# Patient Record
Sex: FEMALE | Race: WHITE | NOT HISPANIC OR LATINO | Employment: FULL TIME | URBAN - METROPOLITAN AREA
[De-identification: names, ages, dates, MRNs, and addresses within clinical notes are randomized per-mention and may not be internally consistent; named-entity substitution may affect disease eponyms.]

---

## 2017-03-13 RX ORDER — TAMSULOSIN HYDROCHLORIDE 0.4 MG/1
0.4 CAPSULE ORAL DAILY
Qty: 30 CAPSULE | Refills: 12 | Status: SHIPPED | OUTPATIENT
Start: 2017-03-13 | End: 2019-02-10

## 2017-03-13 RX ORDER — KETOROLAC TROMETHAMINE 10 MG/1
10 TABLET, FILM COATED ORAL EVERY 6 HOURS
Qty: 15 TABLET | Refills: 2 | Status: SHIPPED | OUTPATIENT
Start: 2017-03-13 | End: 2017-11-22 | Stop reason: SDUPTHER

## 2017-06-15 RX ORDER — CIPROFLOXACIN 500 MG/1
500 TABLET ORAL EVERY 12 HOURS
Qty: 14 TABLET | Refills: 11 | Status: SHIPPED | OUTPATIENT
Start: 2017-06-15 | End: 2017-06-22

## 2017-06-22 RX ORDER — BUPROPION HYDROCHLORIDE 75 MG/1
75 TABLET ORAL 2 TIMES DAILY
Qty: 60 TABLET | Refills: 11 | Status: SHIPPED | OUTPATIENT
Start: 2017-06-22 | End: 2019-02-10

## 2017-07-13 ENCOUNTER — OFFICE VISIT (OUTPATIENT)
Dept: OBSTETRICS AND GYNECOLOGY | Facility: CLINIC | Age: 27
End: 2017-07-13
Payer: COMMERCIAL

## 2017-07-13 VITALS
SYSTOLIC BLOOD PRESSURE: 100 MMHG | BODY MASS INDEX: 19.27 KG/M2 | DIASTOLIC BLOOD PRESSURE: 60 MMHG | HEIGHT: 64 IN | WEIGHT: 112.88 LBS

## 2017-07-13 DIAGNOSIS — N89.8 VAGINAL ODOR: Primary | ICD-10-CM

## 2017-07-13 PROCEDURE — 87210 SMEAR WET MOUNT SALINE/INK: CPT | Mod: QW,S$GLB,, | Performed by: OBSTETRICS & GYNECOLOGY

## 2017-07-13 PROCEDURE — 87591 N.GONORRHOEAE DNA AMP PROB: CPT

## 2017-07-13 PROCEDURE — 99203 OFFICE O/P NEW LOW 30 MIN: CPT | Mod: S$GLB,,, | Performed by: OBSTETRICS & GYNECOLOGY

## 2017-07-13 PROCEDURE — 87070 CULTURE OTHR SPECIMN AEROBIC: CPT

## 2017-07-13 PROCEDURE — 99999 PR PBB SHADOW E&M-EST. PATIENT-LVL III: CPT | Mod: PBBFAC,,, | Performed by: OBSTETRICS & GYNECOLOGY

## 2017-07-13 RX ORDER — METRONIDAZOLE 7.5 MG/G
1 GEL VAGINAL NIGHTLY
Qty: 70 G | Refills: 1 | Status: SHIPPED | OUTPATIENT
Start: 2017-07-13 | End: 2019-02-10

## 2017-07-13 NOTE — PROGRESS NOTES
Subjective:       Patient ID: Gypsy Rivera is a 26 y.o. female.    Chief Complaint:  Vaginal Discharge (discharge with odor.)    History of Present Illness  HPI  25 yo G0 presents as new pt c/o vaginal fishy odor x 4d. No itching/burning/irritation. On seasonique with no recent cycle  GYN & OB History  Patient's last menstrual period was 05/01/2017 (approximate).   Date of Last Pap: No result found    OB History   No data available       Review of Systems  Review of Systems   All other systems reviewed and are negative.          Objective:    Physical Exam:   Constitutional: She is oriented to person, place, and time. She appears well-developed and well-nourished.        Pulmonary/Chest: Effort normal.          Genitourinary: Vagina normal.   Genitourinary Comments: Normal white discharge. Cervix not friable           Musculoskeletal: Moves all extremeties.       Neurological: She is alert and oriented to person, place, and time.    Skin: Skin is warm and dry.    Psychiatric: She has a normal mood and affect. Her behavior is normal.        wet prep clue cells, wbcs    Assessment:        1. Vaginal odor       Plan:      1. metrogel  2. Vaginitis prevention  3. Genital culture, anjum/chlam

## 2017-07-14 LAB
C TRACH DNA SPEC QL NAA+PROBE: NOT DETECTED
N GONORRHOEA DNA SPEC QL NAA+PROBE: NOT DETECTED

## 2017-07-17 LAB — BACTERIA GENITAL AEROBE CULT: NORMAL

## 2017-11-22 RX ORDER — LEVOFLOXACIN 250 MG/1
250 TABLET ORAL DAILY
Qty: 7 TABLET | Refills: 3 | Status: SHIPPED | OUTPATIENT
Start: 2017-11-22 | End: 2019-02-10

## 2017-11-22 RX ORDER — KETOROLAC TROMETHAMINE 10 MG/1
10 TABLET, FILM COATED ORAL EVERY 6 HOURS
Qty: 15 TABLET | Refills: 3 | Status: SHIPPED | OUTPATIENT
Start: 2017-11-22 | End: 2019-02-10

## 2017-11-22 RX ORDER — PHENAZOPYRIDINE HYDROCHLORIDE 200 MG/1
200 TABLET, FILM COATED ORAL 3 TIMES DAILY PRN
Qty: 90 TABLET | Refills: 3 | Status: SHIPPED | OUTPATIENT
Start: 2017-11-22 | End: 2017-12-02

## 2017-11-22 RX ORDER — CYCLOBENZAPRINE HCL 10 MG
10 TABLET ORAL 3 TIMES DAILY PRN
Qty: 90 TABLET | Refills: 3 | Status: SHIPPED | OUTPATIENT
Start: 2017-11-22 | End: 2017-12-02

## 2019-02-10 ENCOUNTER — HOSPITAL ENCOUNTER (EMERGENCY)
Facility: HOSPITAL | Age: 29
Discharge: HOME | End: 2019-02-10
Attending: FAMILY MEDICINE
Payer: COMMERCIAL

## 2019-02-10 VITALS
WEIGHT: 126.06 LBS | HEART RATE: 94 BPM | TEMPERATURE: 98 F | DIASTOLIC BLOOD PRESSURE: 78 MMHG | BODY MASS INDEX: 21.64 KG/M2 | RESPIRATION RATE: 16 BRPM | OXYGEN SATURATION: 98 % | SYSTOLIC BLOOD PRESSURE: 134 MMHG

## 2019-02-10 DIAGNOSIS — N39.0 CHRONIC UTI: ICD-10-CM

## 2019-02-10 DIAGNOSIS — N20.0 NEPHROLITHIASIS: Primary | ICD-10-CM

## 2019-02-10 LAB
ALBUMIN SERPL BCP-MCNC: 3.8 G/DL
ALP SERPL-CCNC: 71 U/L
ALT SERPL W/O P-5'-P-CCNC: 30 U/L
ANION GAP SERPL CALC-SCNC: 9 MMOL/L
AST SERPL-CCNC: 35 U/L
B-HCG UR QL: NEGATIVE
BACTERIA #/AREA URNS HPF: ABNORMAL /HPF
BACTERIA GENITAL QL WET PREP: ABNORMAL
BASOPHILS # BLD AUTO: 0.01 K/UL
BASOPHILS NFR BLD: 0.2 %
BILIRUB SERPL-MCNC: 0.5 MG/DL
BILIRUB UR QL STRIP: ABNORMAL
BUN SERPL-MCNC: 8 MG/DL
CALCIUM SERPL-MCNC: 9.4 MG/DL
CHLORIDE SERPL-SCNC: 108 MMOL/L
CLARITY UR: CLEAR
CLUE CELLS VAG QL WET PREP: ABNORMAL
CO2 SERPL-SCNC: 23 MMOL/L
COLOR UR: ABNORMAL
CREAT SERPL-MCNC: 0.9 MG/DL
DIFFERENTIAL METHOD: NORMAL
EOSINOPHIL # BLD AUTO: 0.1 K/UL
EOSINOPHIL NFR BLD: 1.6 %
ERYTHROCYTE [DISTWIDTH] IN BLOOD BY AUTOMATED COUNT: 13.2 %
EST. GFR  (AFRICAN AMERICAN): >60 ML/MIN/1.73 M^2
EST. GFR  (NON AFRICAN AMERICAN): >60 ML/MIN/1.73 M^2
FILAMENT FUNGI VAG WET PREP-#/AREA: ABNORMAL
GLUCOSE SERPL-MCNC: 86 MG/DL
GLUCOSE UR QL STRIP: ABNORMAL
HCT VFR BLD AUTO: 37.7 %
HGB BLD-MCNC: 12.3 G/DL
HGB UR QL STRIP: ABNORMAL
KETONES UR QL STRIP: ABNORMAL
LEUKOCYTE ESTERASE UR QL STRIP: ABNORMAL
LIPASE SERPL-CCNC: 44 U/L
LYMPHOCYTES # BLD AUTO: 2.1 K/UL
LYMPHOCYTES NFR BLD: 37.3 %
MCH RBC QN AUTO: 29.8 PG
MCHC RBC AUTO-ENTMCNC: 32.6 G/DL
MCV RBC AUTO: 91 FL
MICROSCOPIC COMMENT: ABNORMAL
MONOCYTES # BLD AUTO: 0.3 K/UL
MONOCYTES NFR BLD: 5.1 %
NEUTROPHILS # BLD AUTO: 3.2 K/UL
NEUTROPHILS NFR BLD: 55.8 %
NITRITE UR QL STRIP: ABNORMAL
PH UR STRIP: ABNORMAL [PH] (ref 5–8)
PLATELET # BLD AUTO: 276 K/UL
PMV BLD AUTO: 10.7 FL
POTASSIUM SERPL-SCNC: 4 MMOL/L
PROT SERPL-MCNC: 7.1 G/DL
PROT UR QL STRIP: ABNORMAL
RBC # BLD AUTO: 4.13 M/UL
RBC #/AREA URNS HPF: 5 /HPF (ref 0–4)
SODIUM SERPL-SCNC: 140 MMOL/L
SP GR UR STRIP: ABNORMAL (ref 1–1.03)
SPECIMEN SOURCE: ABNORMAL
SQUAMOUS #/AREA URNS HPF: 0 /HPF
T VAGINALIS GENITAL QL WET PREP: ABNORMAL
URN SPEC COLLECT METH UR: ABNORMAL
UROBILINOGEN UR STRIP-ACNC: ABNORMAL EU/DL
WBC # BLD AUTO: 5.71 K/UL
WBC #/AREA URNS HPF: 0 /HPF (ref 0–5)
WBC #/AREA VAG WET PREP: ABNORMAL
YEAST GENITAL QL WET PREP: ABNORMAL

## 2019-02-10 PROCEDURE — 96374 THER/PROPH/DIAG INJ IV PUSH: CPT

## 2019-02-10 PROCEDURE — 25000003 PHARM REV CODE 250: Performed by: FAMILY MEDICINE

## 2019-02-10 PROCEDURE — 83690 ASSAY OF LIPASE: CPT

## 2019-02-10 PROCEDURE — 36415 COLL VENOUS BLD VENIPUNCTURE: CPT

## 2019-02-10 PROCEDURE — 86703 HIV-1/HIV-2 1 RESULT ANTBDY: CPT

## 2019-02-10 PROCEDURE — 82365 CALCULUS SPECTROSCOPY: CPT

## 2019-02-10 PROCEDURE — 81000 URINALYSIS NONAUTO W/SCOPE: CPT

## 2019-02-10 PROCEDURE — 85025 COMPLETE CBC W/AUTO DIFF WBC: CPT

## 2019-02-10 PROCEDURE — 63600175 PHARM REV CODE 636 W HCPCS: Performed by: FAMILY MEDICINE

## 2019-02-10 PROCEDURE — 99284 EMERGENCY DEPT VISIT MOD MDM: CPT | Mod: 25

## 2019-02-10 PROCEDURE — 96361 HYDRATE IV INFUSION ADD-ON: CPT

## 2019-02-10 PROCEDURE — 81025 URINE PREGNANCY TEST: CPT

## 2019-02-10 PROCEDURE — 80053 COMPREHEN METABOLIC PANEL: CPT

## 2019-02-10 PROCEDURE — 87491 CHLMYD TRACH DNA AMP PROBE: CPT

## 2019-02-10 PROCEDURE — 87210 SMEAR WET MOUNT SALINE/INK: CPT

## 2019-02-10 RX ORDER — PROMETHAZINE HYDROCHLORIDE 25 MG/1
12.5 TABLET ORAL EVERY 6 HOURS PRN
Qty: 15 TABLET | Refills: 0 | Status: SHIPPED | OUTPATIENT
Start: 2019-02-10 | End: 2020-07-15 | Stop reason: CLARIF

## 2019-02-10 RX ORDER — FLUCONAZOLE 150 MG/1
150 TABLET ORAL DAILY
Qty: 1 TABLET | Refills: 0 | Status: SHIPPED | OUTPATIENT
Start: 2019-02-10 | End: 2019-02-11

## 2019-02-10 RX ORDER — CEPHALEXIN 500 MG/1
500 CAPSULE ORAL EVERY 12 HOURS
Qty: 10 CAPSULE | Refills: 0 | Status: SHIPPED | OUTPATIENT
Start: 2019-02-10 | End: 2019-02-15

## 2019-02-10 RX ORDER — TRAZODONE HYDROCHLORIDE 50 MG/1
50 TABLET ORAL NIGHTLY
Status: ON HOLD | COMMUNITY
End: 2023-12-01 | Stop reason: HOSPADM

## 2019-02-10 RX ORDER — KETOROLAC TROMETHAMINE 30 MG/ML
15 INJECTION, SOLUTION INTRAMUSCULAR; INTRAVENOUS
Status: COMPLETED | OUTPATIENT
Start: 2019-02-10 | End: 2019-02-10

## 2019-02-10 RX ORDER — CLONAZEPAM 0.5 MG/1
0.5 TABLET ORAL 2 TIMES DAILY PRN
Status: ON HOLD | COMMUNITY
End: 2023-12-01 | Stop reason: HOSPADM

## 2019-02-10 RX ORDER — HYDROCODONE BITARTRATE AND ACETAMINOPHEN 5; 325 MG/1; MG/1
1 TABLET ORAL EVERY 8 HOURS PRN
Qty: 18 TABLET | Refills: 0 | Status: SHIPPED | OUTPATIENT
Start: 2019-02-10 | End: 2019-02-15

## 2019-02-10 RX ORDER — LEVONORGESTREL AND ETHINYL ESTRADIOL 0.15-0.03
1 KIT ORAL DAILY
COMMUNITY
End: 2021-04-29

## 2019-02-10 RX ORDER — METHYLPHENIDATE HYDROCHLORIDE 18 MG/1
18 TABLET ORAL EVERY MORNING
COMMUNITY
End: 2021-04-29

## 2019-02-10 RX ORDER — DULOXETIN HYDROCHLORIDE 30 MG/1
60 CAPSULE, DELAYED RELEASE ORAL NIGHTLY
COMMUNITY
End: 2021-04-29

## 2019-02-10 RX ORDER — PROPRANOLOL HYDROCHLORIDE 10 MG/1
10 TABLET ORAL DAILY PRN
COMMUNITY

## 2019-02-10 RX ADMIN — KETOROLAC TROMETHAMINE 15 MG: 30 INJECTION, SOLUTION INTRAMUSCULAR; INTRAVENOUS at 10:02

## 2019-02-10 RX ADMIN — SODIUM CHLORIDE 1000 ML: 0.9 INJECTION, SOLUTION INTRAVENOUS at 10:02

## 2019-02-10 NOTE — ED NOTES
Pt. Resting in bed. No acute distress, RR equal and non labored. Bed in low, locked, and call light in reach. Side rails up X 2. Will continue to monitor.

## 2019-02-10 NOTE — ED PROVIDER NOTES
SCRIBE #1 NOTE: ICristina, alicia scribing for, and in the presence of, Emma Caraballo MD. I have scribed the entire note.       History     Chief Complaint   Patient presents with    Flank Pain     Pt c/o left sided intermittent flank pain that began today, Hx of kidney stones in past, last week Dx with UTI and finished course of macrobid, last night reports vaginal discharge and itching- Tx with OTC monistat     Review of patient's allergies indicates:  No Known Allergies      History of Present Illness     HPI    2/10/2019, 9:56 AM  History obtained from the patient      History of Present Illness: Gypsy Rivera is a 28 y.o. female patient with a PMHx of kidney stones who presents to the Emergency Department for evaluation of L flank pain which onset suddenly this morning. Patient reports that she has frequent UTIs and was prescribed Macrobid for UTI one week ago. Her urinary urgency did not improve on the abx. She reports vaginal itching/burning/discharge and she used OTC Monistat because she thought she developed a yeast infection from the abx. This morning, she woke up with suprapubic pain and then developed 10/10 L flank pain that has resolved. She is in no pain currently. The flank pain felt similar to her kidney stone pain in the past. No mitigating or exacerbating factors reported. Patient denies fever, chills, N/V, hematuria, genital sores, and all other sxs at this time. She is sexually active with more than one partner and sometimes uses condoms. She also took Azo this morning.      Arrival mode: Personal vehicle     PCP: Primary Doctor No        Past Medical History:  Past Medical History:   Diagnosis Date    ADD (attention deficit disorder)     Anxiety     Kidney stones        Past Surgical History:  Past Surgical History:   Procedure Laterality Date    COSMETIC SURGERY      rhinoplasty    RHINOPLASTY-cosmetic-will need video Bilateral 7/15/2014    Performed by Krzysztof Nunez III, MD  at [facility] OR 2ND F[facility]         Family History:  Family History   Problem Relation Age of Onset    Heart disease Mother     Cancer Maternal Grandfather         Prostate     Eczema Sister     Melanoma Neg Hx     Psoriasis Neg Hx     Lupus Neg Hx        Social History:  Social History     Tobacco Use    Smoking status: Never Smoker    Smokeless tobacco: Never Used   Substance and Sexual Activity    Alcohol use: Yes     Comment: occasional    Drug use: No    Sexual activity: Yes     Partners: Male        Review of Systems     Review of Systems   Constitutional: Negative for chills and fever.   HENT: Negative for sore throat.    Respiratory: Negative for shortness of breath.    Cardiovascular: Negative for chest pain.   Gastrointestinal: Negative for constipation, diarrhea, nausea and vomiting.   Genitourinary: Positive for flank pain (L), pelvic pain, urgency, vaginal discharge and vaginal pain (burning and itching). Negative for genital sores and hematuria.   Musculoskeletal: Negative for back pain.   Skin: Negative for rash.   Neurological: Negative for weakness.   Hematological: Does not bruise/bleed easily.   All other systems reviewed and are negative.     Physical Exam     Initial Vitals [02/10/19 1002]   BP Pulse Resp Temp SpO2   (!) 150/88 78 18 98.2 °F (36.8 °C) 100 %      MAP       --          Physical Exam  Nursing Notes and Vital Signs Reviewed.  Constitutional: Patient is in no acute distress. Well-developed and well-nourished.  Head: Atraumatic. Normocephalic.  Eyes: PERRL. EOM intact. Conjunctivae are not pale. No scleral icterus.  ENT: Mucous membranes are moist. Oropharynx is clear and symmetric.    Neck: Supple. Full ROM.   Cardiovascular: Regular rate. Regular rhythm. No murmurs, rubs, or gallops.   Pulmonary/Chest: No respiratory distress. Clear to auscultation bilaterally. No wheezing or rales.  Abdominal: Soft and non-distended.  There is no tenderness.  No rebound, guarding, or rigidity.  Good bowel sounds.  Pelvic: A female chaperone was present for this examination. Nl external inspection. No lesions or abnormalities were visible on the labia majora or minora. Cervical os is closed. There is no CMT. There is no blood in the vaginal vault. There is cottage cheese appearing white vaginal discharge. Negative chandelier's sign. No adnexal tenderness. No adnexal masses.  : No CVA TTP.  Musculoskeletal: Moves all extremities. No obvious deformities. No edema.   Skin: Warm and dry.  Neurological:  Alert, awake, and appropriate.  Normal speech.  No acute focal neurological deficits are appreciated.  Psychiatric: Normal affect. Good eye contact. Appropriate in content.     ED Course   Procedures  ED Vital Signs:  Vitals:    02/10/19 1002 02/10/19 1231   BP: (!) 150/88 134/78   Pulse: 78 94   Resp: 18 16   Temp: 98.2 °F (36.8 °C)    TempSrc: Oral    SpO2: 100% 98%   Weight: 57.2 kg (126 lb 1 oz)        Abnormal Lab Results:  Labs Reviewed   URINALYSIS, REFLEX TO URINE CULTURE - Abnormal; Notable for the following components:       Result Value    Color, UA Orange (*)     All other components within normal limits    Narrative:     Preferred Collection Type->Urine, Clean Catch   VAGINAL SCREEN - Abnormal; Notable for the following components:    Bacteria - Vaginal Screen Rare (*)     All other components within normal limits   URINALYSIS MICROSCOPIC - Abnormal; Notable for the following components:    RBC, UA 5 (*)     All other components within normal limits    Narrative:     Preferred Collection Type->Urine, Clean Catch   C. TRACHOMATIS/N. GONORRHOEAE BY AMP DNA    Narrative:     Resulting Location->[facility]   HIV 1 / 2 ANTIBODY   CBC W/ AUTO DIFFERENTIAL   COMPREHENSIVE METABOLIC PANEL   PREGNANCY TEST, URINE RAPID   LIPASE   URINARY STONE ANALYSIS        All Lab Results:  Results for orders placed or performed during the hospital encounter of 02/10/19   C. trachomatis/N. gonorrhoeae by AMP DNA [facility];  Cervix   Result Value Ref Range    Chlamydia, Amplified DNA Not Detected Not Detected    N gonorrhoeae, amplified DNA Not Detected Not Detected   HIV 1/2 Ag/Ab (4th Gen)   Result Value Ref Range    HIV 1/2 Ag/Ab Negative Negative   CBC auto differential   Result Value Ref Range    WBC 5.71 3.90 - 12.70 K/uL    RBC 4.13 4.00 - 5.40 M/uL    Hemoglobin 12.3 12.0 - 16.0 g/dL    Hematocrit 37.7 37.0 - 48.5 %    MCV 91 82 - 98 fL    MCH 29.8 27.0 - 31.0 pg    MCHC 32.6 32.0 - 36.0 g/dL    RDW 13.2 11.5 - 14.5 %    Platelets 276 150 - 350 K/uL    MPV 10.7 9.2 - 12.9 fL    Gran # (ANC) 3.2 1.8 - 7.7 K/uL    Lymph # 2.1 1.0 - 4.8 K/uL    Mono # 0.3 0.3 - 1.0 K/uL    Eos # 0.1 0.0 - 0.5 K/uL    Baso # 0.01 0.00 - 0.20 K/uL    Gran% 55.8 38.0 - 73.0 %    Lymph% 37.3 18.0 - 48.0 %    Mono% 5.1 4.0 - 15.0 %    Eosinophil% 1.6 0.0 - 8.0 %    Basophil% 0.2 0.0 - 1.9 %    Differential Method Automated    Comprehensive metabolic panel   Result Value Ref Range    Sodium 140 136 - 145 mmol/L    Potassium 4.0 3.5 - 5.1 mmol/L    Chloride 108 95 - 110 mmol/L    CO2 23 23 - 29 mmol/L    Glucose 86 70 - 110 mg/dL    BUN, Bld 8 6 - 20 mg/dL    Creatinine 0.9 0.5 - 1.4 mg/dL    Calcium 9.4 8.7 - 10.5 mg/dL    Total Protein 7.1 6.0 - 8.4 g/dL    Albumin 3.8 3.5 - 5.2 g/dL    Total Bilirubin 0.5 0.1 - 1.0 mg/dL    Alkaline Phosphatase 71 55 - 135 U/L    AST 35 10 - 40 U/L    ALT 30 10 - 44 U/L    Anion Gap 9 8 - 16 mmol/L    eGFR if African American >60 >60 mL/min/1.73 m^2    eGFR if non African American >60 >60 mL/min/1.73 m^2   Urinalysis, Reflex to Urine Culture Urine, Clean Catch   Result Value Ref Range    Specimen UA Urine, Clean Catch     Color, UA Orange (A) Yellow, Straw, Kaye    Appearance, UA Clear Clear    pH, UA SEE COMMENT 5.0 - 8.0    Specific Gravity, UA SEE COMMENT 1.005 - 1.030    Protein, UA SEE COMMENT Negative    Glucose, UA SEE COMMENT Negative    Ketones, UA SEE COMMENT Negative    Bilirubin (UA) SEE COMMENT Negative     Occult Blood UA SEE COMMENT Negative    Nitrite, UA SEE COMMENT Negative    Urobilinogen, UA SEE COMMENT <2.0 EU/dL    Leukocytes, UA SEE COMMENT Negative   Pregnancy, urine rapid   Result Value Ref Range    Preg Test, Ur Negative    Lipase   Result Value Ref Range    Lipase 44 4 - 60 U/L   Vaginal Screen Vagina   Result Value Ref Range    Trichomonas None None    Clue Cells, Wet Prep None None    Budding Yeast None None    Fungal Hyphae None None    WBC - Vaginal Screen None None    Bacteria - Vaginal Screen Rare (A) None    Wet Prep Source Vagina None   Urinary Stone Analysis   Result Value Ref Range    Stone Analysis Test Not Performed     Stone Source Stone     Stone Analysis-1st Constituent: 80% Calcium oxalate monohydrate     Stone Analysis-2nd Constituent: 20% Calcium phosphate (apatite)     Stone Analysis-3rd Constituent: Test Not Performed     Nidus, Major Test Not Performed     Nidus, Minor Test Not Performed     Shell, Major Test Not Performed     Shell, Minor Test Not Performed     Stone Analysis Comment Test Not Performed    Urinalysis Microscopic   Result Value Ref Range    RBC, UA 5 (H) 0 - 4 /hpf    WBC, UA 0 0 - 5 /hpf    Bacteria, UA None None-Occ /hpf    Squam Epithel, UA 0 /hpf    Microscopic Comment SEE COMMENT             The Emergency Provider reviewed the vital signs and test results, which are outlined above.     ED Discussion     10:52 AM: Patient notes that as she was providing a urine specimen today, she passed what she believes to be a kidney stone and saved it in a container.    11:00 AM: The kidney stone is in the container and is blackish gray in color consistent with kidney stones. It is hard. Will discontinue the CT and will treat pt for kidney stone. Pt agrees with not having CT scan done. Pt informed to return to ED if she develops fever to r/o pyelonephritis.     12:58 PM: Patient is requesting Diflucan for yeast infection since she will be on an abx and is also requesting  Phenergan for nausea. She states Zofran causes constipation.    I discussed with patient and/or family/caretaker that evaluation in the ED does not suggest any emergent or life threatening medical conditions requiring immediate intervention beyond what was provided in the ED, and I believe patient is safe for discharge.  Regardless, an unremarkable evaluation in the ED does not preclude the development or presence of a serious of life threatening condition. As such, patient was instructed to return immediately for any worsening or change in current symptoms.    Pre-hypertension/Hypertension: The pt has been informed that they may have pre-hypertension or hypertension based on a blood pressure reading in the ED. I recommend that the pt call the PCP listed on their discharge instructions or a physician of their choice this week to arrange f/u for further evaluation of possible pre-hypertension or hypertension.     ED Medication(s):  Medications   sodium chloride 0.9% bolus 1,000 mL (0 mLs Intravenous Stopped 2/10/19 4048)   ketorolac injection 15 mg (15 mg Intravenous Given 2/10/19 1030)       Discharge Medication List as of 2/10/2019 12:40 PM      START taking these medications    Details   cephALEXin (KEFLEX) 500 MG capsule Take 1 capsule (500 mg total) by mouth every 12 (twelve) hours. for 5 days, Starting Sun 2/10/2019, Until Fri 2/15/2019, Print      HYDROcodone-acetaminophen (NORCO) 5-325 mg per tablet Take 1 tablet by mouth every 8 (eight) hours as needed for Pain., Starting Sun 2/10/2019, Until Fri 2/15/2019, Print             Follow-up Information     [facility]. Schedule an appointment as soon as possible for a visit in 2 days.    Contact information:  [address] [facility] [address] [phone]                     Medical Decision Making:   Clinical Tests:   Lab Tests: Ordered and Reviewed         This was a pleasant 28-year-old  female presented to the emergency  department with flank pain.  At nephrolithiasis workup was ordered and a CT of abdomen pelvis without contrast was decided upon myself along with patient to not be ordered as she stated she did not 1 and felt that she had a stone.  She was recently prescribed medications for UTI as well as had been taking over-the-counter symptomatic relief for dysuria.  Patient was actually diagnosed with nephrolithiasis recently as well. She was stable upon discharge and was prescribed narcotic medication as well as antibiotics upon discharge. Pyelonephritis symptoms were discussed and encouraged come back to emergency department.  Patient verbalized understanding and was stable upon discharge.    Scribe Attestation:   Scribe #1: I performed the above scribed service and the documentation accurately describes the services I performed. I attest to the accuracy of the note.     Attending:   Physician Attestation Statement for Scribe #1: I, Emma Caraballo MD, personally performed the services described in this documentation, as scribed by Cristina Sotelo, in my presence, and it is both accurate and complete.           Clinical Impression       ICD-10-CM ICD-9-CM   1. Nephrolithiasis N20.0 592.0   2. Chronic UTI N39.0 599.0       Disposition:   Disposition: Discharged  Condition: Stable         Emma Caraballo MD  03/06/19 7916

## 2019-02-10 NOTE — ED NOTES
Pt. Resting in bed. No acute distress, RR equal and non labored, VSS. Bed in low, locked, and call light in reach. Side rails up X 2. Will continue to monitor.

## 2019-02-11 LAB — HIV 1+2 AB+HIV1 P24 AG SERPL QL IA: NEGATIVE

## 2019-02-13 LAB
C TRACH DNA SPEC QL NAA+PROBE: NOT DETECTED
N GONORRHOEA DNA SPEC QL NAA+PROBE: NOT DETECTED

## 2019-02-15 LAB
ANNOTATION COMMENT IMP: NORMAL
COMPN STONE: NORMAL
SPECIMEN SOURCE: NORMAL
STONE ANALYSIS IR-IMP: NORMAL

## 2019-04-01 ENCOUNTER — OFFICE VISIT (OUTPATIENT)
Dept: UROLOGY | Facility: CLINIC | Age: 29
End: 2019-04-01
Payer: COMMERCIAL

## 2019-04-01 ENCOUNTER — HOSPITAL ENCOUNTER (OUTPATIENT)
Dept: RADIOLOGY | Facility: HOSPITAL | Age: 29
Discharge: HOME | End: 2019-04-01
Attending: UROLOGY
Payer: COMMERCIAL

## 2019-04-01 VITALS
HEART RATE: 88 BPM | BODY MASS INDEX: 21.53 KG/M2 | WEIGHT: 126.13 LBS | HEIGHT: 64 IN | DIASTOLIC BLOOD PRESSURE: 78 MMHG | SYSTOLIC BLOOD PRESSURE: 130 MMHG

## 2019-04-01 DIAGNOSIS — N39.0 URINARY TRACT INFECTION WITHOUT HEMATURIA, SITE UNSPECIFIED: ICD-10-CM

## 2019-04-01 DIAGNOSIS — N39.0 URINARY TRACT INFECTION WITHOUT HEMATURIA, SITE UNSPECIFIED: Primary | ICD-10-CM

## 2019-04-01 PROCEDURE — 99204 OFFICE O/P NEW MOD 45 MIN: CPT | Mod: S$GLB,,, | Performed by: UROLOGY

## 2019-04-01 PROCEDURE — 74176 CT ABD & PELVIS W/O CONTRAST: CPT | Mod: 26,,, | Performed by: RADIOLOGY

## 2019-04-01 PROCEDURE — 3008F BODY MASS INDEX DOCD: CPT | Mod: CPTII,S$GLB,, | Performed by: UROLOGY

## 2019-04-01 PROCEDURE — 99999 PR PBB SHADOW E&M-EST. PATIENT-LVL III: CPT | Mod: PBBFAC,,, | Performed by: UROLOGY

## 2019-04-01 PROCEDURE — 74018 RADEX ABDOMEN 1 VIEW: CPT | Mod: 26,,, | Performed by: RADIOLOGY

## 2019-04-01 PROCEDURE — 3008F PR BODY MASS INDEX (BMI) DOCUMENTED: ICD-10-PCS | Mod: CPTII,S$GLB,, | Performed by: UROLOGY

## 2019-04-01 PROCEDURE — 87086 URINE CULTURE/COLONY COUNT: CPT

## 2019-04-01 PROCEDURE — 74018 RADEX ABDOMEN 1 VIEW: CPT | Mod: TC

## 2019-04-01 PROCEDURE — 74176 CT ABD & PELVIS W/O CONTRAST: CPT | Mod: TC

## 2019-04-01 PROCEDURE — 74018 XR ABDOMEN AP 1 VIEW: ICD-10-PCS | Mod: 26,,, | Performed by: RADIOLOGY

## 2019-04-01 PROCEDURE — 74176 CT RENAL STONE STUDY ABD PELVIS WO: ICD-10-PCS | Mod: 26,,, | Performed by: RADIOLOGY

## 2019-04-01 PROCEDURE — 99999 PR PBB SHADOW E&M-EST. PATIENT-LVL III: ICD-10-PCS | Mod: PBBFAC,,, | Performed by: UROLOGY

## 2019-04-01 PROCEDURE — 99204 PR OFFICE/OUTPT VISIT, NEW, LEVL IV, 45-59 MIN: ICD-10-PCS | Mod: S$GLB,,, | Performed by: UROLOGY

## 2019-04-01 RX ORDER — NITROFURANTOIN MACROCRYSTALS 50 MG/1
50 CAPSULE ORAL EVERY 6 HOURS
Qty: 30 CAPSULE | Refills: 4 | Status: SHIPPED | OUTPATIENT
Start: 2019-04-01 | End: 2020-04-15 | Stop reason: SDUPTHER

## 2019-04-01 NOTE — PROGRESS NOTES
Subjective:       Patient ID: Gypsy Rivera is a 28 y.o. female.    Chief Complaint: Urinary Tract Infection and Nephrolithiasis    HPI    Gypsy Rivera is a 28 y.o. female with PMHx of kidney stones and vaginosis here for management and evaluation of recurrent urinary tract infection. Patient went to the ED on 02/10/19 for nephrolithiasis and states she has since passed her stone. She was also treated by her GYN at [facility] for UTIs with nitrofurantoin. Believes her UTI symptoms are related to intercourse. Her UTI symptoms include frequency, urgency, and odiferous urine. Denies gross hematuria.    Past Medical History:   Diagnosis Date    ADD (attention deficit disorder)     Anxiety     Kidney stones        Past Surgical History:   Procedure Laterality Date    COSMETIC SURGERY      rhinoplasty    RHINOPLASTY-cosmetic-will need video Bilateral 7/15/2014    Performed by Krzysztof Nunez III, MD at [facility] OR [address] [place] [address]       Family History   Problem Relation Age of Onset    Heart disease Mother     Cancer Maternal Grandfather         Prostate     Eczema Sister     Melanoma Neg Hx     Psoriasis Neg Hx     Lupus Neg Hx        Social History     Socioeconomic History    Marital status: Single     Spouse name: Not on file    Number of children: Not on file    Years of education: Not on file    Highest education level: Not on file   Occupational History     Employer: [employer]   Social Needs    Financial resource strain: Not on file    Food insecurity:     Worry: Not on file     Inability: Not on file    Transportation needs:     Medical: Not on file     Non-medical: Not on file   Tobacco Use    Smoking status: Never Smoker    Smokeless tobacco: Never Used   Substance and Sexual Activity    Alcohol use: Yes     Comment: occasional    Drug use: No    Sexual activity: Yes     Partners: Male   Lifestyle    Physical activity:     Days per week: Not on file     Minutes per  session: Not on file    Stress: Not on file   Relationships    Social connections:     Talks on phone: Not on file     Gets together: Not on file     Attends Yazidism service: Not on file     Active member of club or organization: Not on file     Attends meetings of clubs or organizations: Not on file     Relationship status: Not on file    Intimate partner violence:     Fear of current or ex partner: Not on file     Emotionally abused: Not on file     Physically abused: Not on file     Forced sexual activity: Not on file   Other Topics Concern    Are you pregnant or think you may be? Not Asked    Breast-feeding Not Asked   Social History Narrative    Graduated from [employer] in [employer] in Motion studies. She has an apartment in [place]. She plans to attend law school at [employer]. Her family lives in [place]. Her mom is practice manager for the Cardiovascular surgeons at [employer]. She is the oldest of soon to 8 children.        Allergies:  Patient has no known allergies.    Medications:    Current Outpatient Medications:     cetirizine (ZYRTEC) 10 MG tablet, Take 10 mg by mouth once daily., Disp: , Rfl:     clonazePAM (KLONOPIN) 0.5 MG tablet, Take 0.5 mg by mouth 2 (two) times daily as needed for Anxiety., Disp: , Rfl:     DULoxetine (CYMBALTA) 30 MG capsule, Take 60 mg by mouth nightly., Disp: , Rfl:     levonorgestrel-ethinyl estradiol (SEASONALE) 0.15 mg-30 mcg per tablet, Take 1 tablet by mouth once daily., Disp: , Rfl:     methylphenidate HCl (CONCERTA) 18 MG CR tablet, Take 18 mg by mouth every morning., Disp: , Rfl:     nitrofurantoin (MACRODANTIN) 50 MG capsule, Take 1 capsule (50 mg total) by mouth every 6 (six) hours., Disp: 30 capsule, Rfl: 4    promethazine (PHENERGAN) 25 MG tablet, Take 0.5 tablets (12.5 mg total) by mouth every 6 (six) hours as needed for Nausea., Disp: 15 tablet, Rfl: 0    propranolol (INDERAL) 10 MG tablet, Take 10 mg by mouth daily as needed., Disp: , Rfl:     traZODone  (DESYREL) 50 MG tablet, Take 50 mg by mouth every evening., Disp: , Rfl:     Review of Systems   Constitutional: Negative for activity change, appetite change, chills, diaphoresis, fatigue, fever and unexpected weight change.   HENT: Negative for congestion, dental problem, hearing loss, mouth sores, postnasal drip, rhinorrhea, sinus pressure and trouble swallowing.    Eyes: Negative for pain, discharge and itching.   Respiratory: Negative for apnea, cough, choking, chest tightness, shortness of breath and wheezing.    Cardiovascular: Negative for chest pain, palpitations and leg swelling.   Gastrointestinal: Negative for abdominal distention, abdominal pain, anal bleeding, blood in stool, constipation, diarrhea, nausea, rectal pain and vomiting.   Endocrine: Negative for polydipsia and polyuria.   Genitourinary: Positive for frequency and urgency. Negative for decreased urine volume, difficulty urinating, dyspareunia, dysuria, enuresis, flank pain, genital sores, hematuria, menstrual problem and pelvic pain.   Musculoskeletal: Negative for arthralgias, back pain and myalgias.   Skin: Negative for color change, rash and wound.   Neurological: Negative for dizziness, syncope, speech difficulty, light-headedness and headaches.   Hematological: Negative for adenopathy. Does not bruise/bleed easily.   Psychiatric/Behavioral: Negative for behavioral problems, confusion and sleep disturbance.       Objective:      Physical Exam   Constitutional: She appears well-developed.   HENT:   Head: Normocephalic.   Neck: Neck supple.   Cardiovascular: Normal rate.    Pulmonary/Chest: Effort normal.   Abdominal: Soft.   Genitourinary:   Genitourinary Comments: Urine dip shows RBC's present.   Neurological: She is alert.   Skin: Skin is warm.     Psychiatric: She has a normal mood and affect.       Assessment:       1. Urinary tract infection without hematuria, site unspecified        Plan:       Gypsy was seen today for urinary  tract infection and nephrolithiasis.    Diagnoses and all orders for this visit:    Urinary tract infection without hematuria, site unspecified  -     Urine culture; Future  -     CT Renal Stone Study ABD Pelvis WO; Future  -     X-Ray Abdomen AP 1 View; Future    Other orders  -     nitrofurantoin (MACRODANTIN) 50 MG capsule; Take 1 capsule (50 mg total) by mouth every 6 (six) hours.          Recommended taking macrodantin and emptying bladder before and after intercourse.  Recommended probiotic and yogurt.  Urine culture and will phone review results with patient.  Start macrodantin.  Cystoscopy not needed due to the patient's young age.  KUB and CT RSS; will phone review results with patient.  RTC 3 months or sooner prn.    IRj, am acting as a scribe on this patient encounter in the presence and under the supervision of Dr. Cordova.    04/01/2019 7:24 AM    I, Dr. Cordova, personally performed the services described in this documentation.   All medical record entries made by the scribe were at my direction and in my presence.   I have reviewed the chart and agree that the record is accurate and complete.   Aubrey Cordova MD.  7:41 AM 04/01/2019

## 2019-04-02 ENCOUNTER — TELEPHONE (OUTPATIENT)
Dept: UROLOGY | Facility: CLINIC | Age: 29
End: 2019-04-02

## 2019-04-02 ENCOUNTER — PATIENT MESSAGE (OUTPATIENT)
Dept: UROLOGY | Facility: CLINIC | Age: 29
End: 2019-04-02

## 2019-04-02 LAB — BACTERIA UR CULT: NORMAL

## 2019-04-02 RX ORDER — OXYBUTYNIN CHLORIDE 5 MG/1
5 TABLET, EXTENDED RELEASE ORAL DAILY
Qty: 30 TABLET | Refills: 11 | Status: SHIPPED | OUTPATIENT
Start: 2019-04-02 | End: 2020-07-15 | Stop reason: CLARIF

## 2019-04-02 NOTE — TELEPHONE ENCOUNTER
Ditropan xl called in   CT shows stone in appendix and spine changes  No inflammation of appendix  See gen surgery and her spine md prn or pcp

## 2019-12-17 ENCOUNTER — PATIENT MESSAGE (OUTPATIENT)
Dept: UROLOGY | Facility: CLINIC | Age: 29
End: 2019-12-17

## 2019-12-18 RX ORDER — METRONIDAZOLE 500 MG/1
500 TABLET ORAL EVERY 8 HOURS
Qty: 21 TABLET | Refills: 0 | Status: SHIPPED | OUTPATIENT
Start: 2019-12-18 | End: 2020-07-15 | Stop reason: CLARIF

## 2020-01-22 ENCOUNTER — PATIENT MESSAGE (OUTPATIENT)
Dept: UROLOGY | Facility: CLINIC | Age: 30
End: 2020-01-22

## 2020-01-23 ENCOUNTER — OFFICE VISIT (OUTPATIENT)
Dept: UROLOGY | Facility: CLINIC | Age: 30
End: 2020-01-23
Payer: COMMERCIAL

## 2020-01-23 VITALS
HEART RATE: 98 BPM | BODY MASS INDEX: 22.59 KG/M2 | DIASTOLIC BLOOD PRESSURE: 86 MMHG | SYSTOLIC BLOOD PRESSURE: 134 MMHG | WEIGHT: 131.63 LBS

## 2020-01-23 DIAGNOSIS — Z87.442 HISTORY OF KIDNEY STONES: ICD-10-CM

## 2020-01-23 DIAGNOSIS — R10.9 RIGHT FLANK PAIN: Primary | ICD-10-CM

## 2020-01-23 PROCEDURE — 3008F PR BODY MASS INDEX (BMI) DOCUMENTED: ICD-10-PCS | Mod: CPTII,S$GLB,, | Performed by: PHYSICIAN ASSISTANT

## 2020-01-23 PROCEDURE — 99213 OFFICE O/P EST LOW 20 MIN: CPT | Mod: S$GLB,,, | Performed by: PHYSICIAN ASSISTANT

## 2020-01-23 PROCEDURE — 99213 PR OFFICE/OUTPT VISIT, EST, LEVL III, 20-29 MIN: ICD-10-PCS | Mod: S$GLB,,, | Performed by: PHYSICIAN ASSISTANT

## 2020-01-23 PROCEDURE — 99999 PR PBB SHADOW E&M-EST. PATIENT-LVL III: CPT | Mod: PBBFAC,,, | Performed by: PHYSICIAN ASSISTANT

## 2020-01-23 PROCEDURE — 99999 PR PBB SHADOW E&M-EST. PATIENT-LVL III: ICD-10-PCS | Mod: PBBFAC,,, | Performed by: PHYSICIAN ASSISTANT

## 2020-01-23 PROCEDURE — 3008F BODY MASS INDEX DOCD: CPT | Mod: CPTII,S$GLB,, | Performed by: PHYSICIAN ASSISTANT

## 2020-01-23 NOTE — PROGRESS NOTES
CHIEF COMPLAINT:    Mrs. Rivera is a 29 y.o. female presenting for right flank pain.  PRESENTING ILLNESS:    Gypsy Rivera is a 29 y.o. female with a PMH of nephrolithiasis who presents for right sided back pain x 2 weeks.  It is intermittent.  This is a persistent pain that is on and off.  She has a history of kidney stones but this pain is  different as it is not as severe.  The pain does not radiate.  Nothing makes it better or worse.  She has not tried any medication for the pain.  She may feel it one day and then the next day doesn't experience any pain.  The pain is dull in nature.  She does not have any urinary complaints.  No frequency, dysuria, gross hematuria.         CT RSS 4/2019: Kidneys/Ureters: No mass, hydroureteronephrosis, or nephroureterolithiasis.  Bladder: No wall thickening.    KUB 4/2019: no kidney stones seen    REVIEW OF SYSTEMS:  Constitutional: Negative for fever and chills.   HENT: Negative for hearing loss.   Eyes: Negative for visual disturbance.   Respiratory: Negative for shortness of breath.   Cardiovascular: Negative for chest pain.   Gastrointestinal: Negative for vomiting, and constipation.   Genitourinary:  See HPI  Neurological: Negative for dizziness.   Hematological: Does not bruise/bleed easily.   Psychiatric/Behavioral: Negative for confusion.     PATIENT HISTORY:    Past Medical History:   Diagnosis Date    ADD (attention deficit disorder)     Anxiety     Kidney stones        Past Surgical History:   Procedure Laterality Date    COSMETIC SURGERY      rhinoplasty       Family History   Problem Relation Age of Onset    Heart disease Mother     Cancer Maternal Grandfather         Prostate     Eczema Sister     Melanoma Neg Hx     Psoriasis Neg Hx     Lupus Neg Hx        Social History     Socioeconomic History    Marital status: Single     Spouse name: Not on file    Number of children: Not on file    Years of education: Not on file    Highest education  level: Not on file   Occupational History     Employer: [employer]   Social Needs    Financial resource strain: Not on file    Food insecurity:     Worry: Not on file     Inability: Not on file    Transportation needs:     Medical: Not on file     Non-medical: Not on file   Tobacco Use    Smoking status: Never Smoker    Smokeless tobacco: Never Used   Substance and Sexual Activity    Alcohol use: Yes     Comment: occasional    Drug use: No    Sexual activity: Yes     Partners: Male   Lifestyle    Physical activity:     Days per week: Not on file     Minutes per session: Not on file    Stress: Not on file   Relationships    Social connections:     Talks on phone: Not on file     Gets together: Not on file     Attends Judaism service: Not on file     Active member of club or organization: Not on file     Attends meetings of clubs or organizations: Not on file     Relationship status: Not on file   Other Topics Concern    Are you pregnant or think you may be? Not Asked    Breast-feeding Not Asked   Social History Narrative    Graduated from \[employer]\"" in international business studies. She has an apartment in [place]. She plans to attend law school at \[employer]\"". Her family lives in [place]. Her mom is practice manager for the Cardiovascular surgeons at [employer]. She is the oldest of soon to 8 children.        Allergies:  Patient has no known allergies.    Medications:    Current Outpatient Medications:     cetirizine (ZYRTEC) 10 MG tablet, Take 10 mg by mouth once daily., Disp: , Rfl:     clonazePAM (KLONOPIN) 0.5 MG tablet, Take 0.5 mg by mouth 2 (two) times daily as needed for Anxiety., Disp: , Rfl:     levonorgestrel-ethinyl estradiol (SEASONALE) 0.15 mg-30 mcg per tablet, Take 1 tablet by mouth once daily., Disp: , Rfl:     methylphenidate HCl (CONCERTA) 18 MG CR tablet, Take 18 mg by mouth every morning., Disp: , Rfl:     nitrofurantoin (MACRODANTIN) 50 MG capsule, Take 1 capsule (50 mg  total) by mouth every 6 (six) hours., Disp: 30 capsule, Rfl: 4    oxybutynin (DITROPAN-XL) 5 MG TR24, Take 1 tablet (5 mg total) by mouth once daily., Disp: 30 tablet, Rfl: 11    propranolol (INDERAL) 10 MG tablet, Take 10 mg by mouth daily as needed., Disp: , Rfl:     traZODone (DESYREL) 50 MG tablet, Take 50 mg by mouth every evening., Disp: , Rfl:     DULoxetine (CYMBALTA) 30 MG capsule, Take 60 mg by mouth nightly., Disp: , Rfl:     metroNIDAZOLE (FLAGYL) 500 MG tablet, Take 1 tablet (500 mg total) by mouth every 8 (eight) hours. (Patient not taking: Reported on 1/23/2020), Disp: 21 tablet, Rfl: 0    promethazine (PHENERGAN) 25 MG tablet, Take 0.5 tablets (12.5 mg total) by mouth every 6 (six) hours as needed for Nausea. (Patient not taking: Reported on 1/23/2020), Disp: 15 tablet, Rfl: 0    PHYSICAL EXAMINATION:    Constitutional: She appears well-developed and well-nourished.  She is in no apparent distress.    Eyes: No scleral icterus noted bilaterally. No discharge bilaterally.    Nose: No rhinorrhea    Cardiovascular: Normal rate.  No pitting edema noted in lower extremities bilaterally    Pulmonary/Chest: Effort normal. No respiratory distress.     Abdominal:  She exhibits no distension.  There is no CVA tenderness.     Lymphadenopathy:          Right: No supraclavicular adenopathy present.        Left: No supraclavicular adenopathy present.     Neurological: She is alert and oriented to person, place, and time.     Skin: Skin is warm and dry.     Psych: Cooperative with normal affect.    Physical Exam   Musculoskeletal:        Back:          LABS:    U/a: unable to void    IMPRESSION:    Encounter Diagnoses   Name Primary?    Right flank pain Yes    History of kidney stones        PLAN:    Patient is tender to palpation on exam.  Likely due to musculoskeletal pain.  However, given her history of kidney stones, I will get renal ultrasound to  follow up on right flank pain.  Ibuprofen for pain as  needed as directed.      Jesenia Carter PA-C

## 2020-01-24 ENCOUNTER — HOSPITAL ENCOUNTER (OUTPATIENT)
Dept: RADIOLOGY | Facility: HOSPITAL | Age: 30
Discharge: HOME | End: 2020-01-24
Attending: PHYSICIAN ASSISTANT
Payer: COMMERCIAL

## 2020-01-24 DIAGNOSIS — R10.9 RIGHT FLANK PAIN: ICD-10-CM

## 2020-01-24 PROCEDURE — 76770 US RETROPERITONEAL COMPLETE: ICD-10-PCS | Mod: 26,,, | Performed by: RADIOLOGY

## 2020-01-24 PROCEDURE — 76770 US EXAM ABDO BACK WALL COMP: CPT | Mod: TC

## 2020-01-24 PROCEDURE — 76770 US EXAM ABDO BACK WALL COMP: CPT | Mod: 26,,, | Performed by: RADIOLOGY

## 2020-03-06 ENCOUNTER — HOSPITAL ENCOUNTER (EMERGENCY)
Facility: HOSPITAL | Age: 30
Discharge: HOME | End: 2020-03-07
Attending: EMERGENCY MEDICINE
Payer: COMMERCIAL

## 2020-03-06 DIAGNOSIS — R10.9 LEFT FLANK PAIN: Primary | ICD-10-CM

## 2020-03-06 LAB
B-HCG UR QL: NEGATIVE
CTP QC/QA: YES

## 2020-03-06 PROCEDURE — 80048 BASIC METABOLIC PNL TOTAL CA: CPT

## 2020-03-06 PROCEDURE — 99284 PR EMERGENCY DEPT VISIT,LEVEL IV: ICD-10-PCS | Mod: ,,, | Performed by: PHYSICIAN ASSISTANT

## 2020-03-06 PROCEDURE — 81001 URINALYSIS AUTO W/SCOPE: CPT

## 2020-03-06 PROCEDURE — 96374 THER/PROPH/DIAG INJ IV PUSH: CPT

## 2020-03-06 PROCEDURE — 85025 COMPLETE CBC W/AUTO DIFF WBC: CPT

## 2020-03-06 PROCEDURE — 96375 TX/PRO/DX INJ NEW DRUG ADDON: CPT

## 2020-03-06 PROCEDURE — 96361 HYDRATE IV INFUSION ADD-ON: CPT

## 2020-03-06 PROCEDURE — 81025 URINE PREGNANCY TEST: CPT | Performed by: PHYSICIAN ASSISTANT

## 2020-03-06 PROCEDURE — 99284 EMERGENCY DEPT VISIT MOD MDM: CPT | Mod: ,,, | Performed by: PHYSICIAN ASSISTANT

## 2020-03-06 PROCEDURE — 99285 EMERGENCY DEPT VISIT HI MDM: CPT | Mod: 25

## 2020-03-06 PROCEDURE — 63600175 PHARM REV CODE 636 W HCPCS: Performed by: PHYSICIAN ASSISTANT

## 2020-03-06 RX ORDER — KETOROLAC TROMETHAMINE 30 MG/ML
10 INJECTION, SOLUTION INTRAMUSCULAR; INTRAVENOUS
Status: COMPLETED | OUTPATIENT
Start: 2020-03-06 | End: 2020-03-06

## 2020-03-06 RX ORDER — ONDANSETRON 2 MG/ML
4 INJECTION INTRAMUSCULAR; INTRAVENOUS
Status: COMPLETED | OUTPATIENT
Start: 2020-03-06 | End: 2020-03-06

## 2020-03-06 RX ADMIN — SODIUM CHLORIDE 1000 ML: 0.9 INJECTION, SOLUTION INTRAVENOUS at 11:03

## 2020-03-06 RX ADMIN — KETOROLAC TROMETHAMINE 10 MG: 30 INJECTION, SOLUTION INTRAMUSCULAR; INTRAVENOUS at 11:03

## 2020-03-06 RX ADMIN — ONDANSETRON 4 MG: 2 INJECTION INTRAMUSCULAR; INTRAVENOUS at 11:03

## 2020-03-07 VITALS
DIASTOLIC BLOOD PRESSURE: 76 MMHG | RESPIRATION RATE: 16 BRPM | WEIGHT: 125 LBS | SYSTOLIC BLOOD PRESSURE: 130 MMHG | HEIGHT: 64 IN | TEMPERATURE: 98 F | BODY MASS INDEX: 21.34 KG/M2 | HEART RATE: 73 BPM | OXYGEN SATURATION: 100 %

## 2020-03-07 LAB
ANION GAP SERPL CALC-SCNC: 9 MMOL/L (ref 8–16)
BACTERIA #/AREA URNS AUTO: NORMAL /HPF
BASOPHILS # BLD AUTO: 0.01 K/UL (ref 0–0.2)
BASOPHILS NFR BLD: 0.1 % (ref 0–1.9)
BILIRUB UR QL STRIP: NEGATIVE
BUN SERPL-MCNC: 9 MG/DL (ref 6–20)
CALCIUM SERPL-MCNC: 9.9 MG/DL (ref 8.7–10.5)
CHLORIDE SERPL-SCNC: 105 MMOL/L (ref 95–110)
CLARITY UR REFRACT.AUTO: CLEAR
CO2 SERPL-SCNC: 25 MMOL/L (ref 23–29)
COLOR UR AUTO: YELLOW
CREAT SERPL-MCNC: 0.8 MG/DL (ref 0.5–1.4)
DIFFERENTIAL METHOD: NORMAL
EOSINOPHIL # BLD AUTO: 0.1 K/UL (ref 0–0.5)
EOSINOPHIL NFR BLD: 1.1 % (ref 0–8)
ERYTHROCYTE [DISTWIDTH] IN BLOOD BY AUTOMATED COUNT: 12.1 % (ref 11.5–14.5)
EST. GFR  (AFRICAN AMERICAN): >60 ML/MIN/1.73 M^2
EST. GFR  (NON AFRICAN AMERICAN): >60 ML/MIN/1.73 M^2
GLUCOSE SERPL-MCNC: 99 MG/DL (ref 70–110)
GLUCOSE UR QL STRIP: NEGATIVE
HCT VFR BLD AUTO: 42.8 % (ref 37–48.5)
HGB BLD-MCNC: 13.7 G/DL (ref 12–16)
HGB UR QL STRIP: ABNORMAL
IMM GRANULOCYTES # BLD AUTO: 0.03 K/UL (ref 0–0.04)
IMM GRANULOCYTES NFR BLD AUTO: 0.4 % (ref 0–0.5)
KETONES UR QL STRIP: NEGATIVE
LEUKOCYTE ESTERASE UR QL STRIP: NEGATIVE
LYMPHOCYTES # BLD AUTO: 2.3 K/UL (ref 1–4.8)
LYMPHOCYTES NFR BLD: 32.5 % (ref 18–48)
MCH RBC QN AUTO: 29.7 PG (ref 27–31)
MCHC RBC AUTO-ENTMCNC: 32 G/DL (ref 32–36)
MCV RBC AUTO: 93 FL (ref 82–98)
MICROSCOPIC COMMENT: NORMAL
MONOCYTES # BLD AUTO: 0.4 K/UL (ref 0.3–1)
MONOCYTES NFR BLD: 4.9 % (ref 4–15)
NEUTROPHILS # BLD AUTO: 4.3 K/UL (ref 1.8–7.7)
NEUTROPHILS NFR BLD: 61 % (ref 38–73)
NITRITE UR QL STRIP: NEGATIVE
NRBC BLD-RTO: 0 /100 WBC
PH UR STRIP: 5 [PH] (ref 5–8)
PLATELET # BLD AUTO: 300 K/UL (ref 150–350)
PMV BLD AUTO: 11.4 FL (ref 9.2–12.9)
POTASSIUM SERPL-SCNC: 3.9 MMOL/L (ref 3.5–5.1)
PROT UR QL STRIP: NEGATIVE
RBC # BLD AUTO: 4.61 M/UL (ref 4–5.4)
RBC #/AREA URNS AUTO: 4 /HPF (ref 0–4)
SODIUM SERPL-SCNC: 139 MMOL/L (ref 136–145)
SP GR UR STRIP: 1.03 (ref 1–1.03)
URN SPEC COLLECT METH UR: ABNORMAL
WBC # BLD AUTO: 7.1 K/UL (ref 3.9–12.7)
WBC #/AREA URNS AUTO: 3 /HPF (ref 0–5)

## 2020-03-07 RX ORDER — TAMSULOSIN HYDROCHLORIDE 0.4 MG/1
0.4 CAPSULE ORAL DAILY
Qty: 30 CAPSULE | Refills: 0 | Status: SHIPPED | OUTPATIENT
Start: 2020-03-07 | End: 2020-07-15 | Stop reason: CLARIF

## 2020-03-07 RX ORDER — KETOROLAC TROMETHAMINE 10 MG/1
10 TABLET, FILM COATED ORAL EVERY 6 HOURS PRN
Qty: 30 TABLET | Refills: 0 | Status: SHIPPED | OUTPATIENT
Start: 2020-03-07 | End: 2021-04-29

## 2020-03-07 RX ORDER — ONDANSETRON 4 MG/1
4 TABLET, ORALLY DISINTEGRATING ORAL EVERY 6 HOURS PRN
Qty: 15 TABLET | Refills: 0 | Status: SHIPPED | OUTPATIENT
Start: 2020-03-07 | End: 2020-07-15 | Stop reason: CLARIF

## 2020-03-07 NOTE — ED TRIAGE NOTES
Gypsy Rivera, an 29 y.o. female presents to the ED c/o left flank pain 8/10 x2 days. Pain comes and goes in severity. Pmhx of kidney stones. Denies blood in urine.       Chief Complaint   Patient presents with    Flank Pain     hx of kidney stones.  reports left flank pain since yesterday     Review of patient's allergies indicates:  No Known Allergies  Past Medical History:   Diagnosis Date    ADD (attention deficit disorder)     Anxiety     Kidney stones      LOC: The patient is awake, alert and aware of environment with an appropriate affect, the patient is oriented x 3 and speaking appropriately.   APPEARANCE: Patient appears comfortable and in no acute distress, patient is clean and well groomed.  SKIN: The skin is warm and dry, color consistent with ethnicity, patient has normal skin turgor and moist mucus membranes, skin intact, no breakdown or bruising noted.   MUSCULOSKELETAL: Patient moving all extremities spontaneously, no swelling noted.  RESPIRATORY: Airway is open and patent, respirations are spontaneous, patient has a normal effort and rate, no accessory muscle use noted. -SOB, Cough.  CARDIAC: Patient has a normal rate and regular rhythm, no edema noted, capillary refill < 3 seconds. -Chest pain.  GASTRO: Soft and non tender to palpation, no distention noted. -N/V/D.  : Pt denies any pain or frequency with urination. No blood noted urine.  NEURO: Pt opens eyes spontaneously, behavior appropriate to situation, follows commands, facial expression symmetrical, bilateral hand grasp equal and even, purposeful motor response noted, normal sensation in all extremities when touched with a finger.

## 2020-03-07 NOTE — PROVIDER PROGRESS NOTES - EMERGENCY DEPT.
ED Physician Hand-off Note:    ED Course: I assumed care of patient from off-going ED physician team. Briefly, Patient is a 29-year-old female with history of nephrolithiasis presenting for flank pain.    At the time of signout plan was pending renal stone study and lab workup.    Medications given in the ED:    Medications   sodium chloride 0.9% bolus 1,000 mL (0 mLs Intravenous Stopped 3/7/20 0031)   ketorolac injection 9.999 mg (9.999 mg Intravenous Given 3/6/20 2335)   ondansetron injection 4 mg (4 mg Intravenous Given 3/6/20 2335)     UA with blood, otherwise normal lab work.  CT does not show any obstructive stones.  Given hematuria and plastic symptoms will treat empirically with Flomax, Toradol and Zofran.  Patient will follow up with her urologist.      Disposition:  Discharged    Patient comfortable with discharge. Patient counseled regarding exam, results, diagnosis, treatment, and plan.    Impression:  Flank pain

## 2020-03-07 NOTE — ED PROVIDER NOTES
Encounter Date: 3/6/2020       History     Chief Complaint   Patient presents with    Flank Pain     hx of kidney stones.  reports left flank pain since yesterday     Patient is a 29-year-old female with past medical history of nephrolithiasis, ADD who presents the emergency department complaints left flank pain that began yesterday.  Pain has been sharp and intermittent since onset.  Patient reports that she called her urologist today to discuss her symptoms. He recommended for her to take norco, ibuprofen, and phenergan for pain control and follow up on Monday.  She reports that she had 1 episode of nonbloody emesis about 30 min or so after taking Norco.  She currently reports slight nausea. She states that the pain worsened tonight which prompted her visit to the ED. She denies chest pain, shortness of breath, diarrhea, dysuria, hematuria, fevers, abdominal pain. She reports several episodes of nephrolithiasis bilaterally in the past. She has passed the stones without surgeries or procedures.  She reports pain today is similar to past episodes of nephrolithiasis. Additionally, she reports colposcopy performed on Monday. Since the procedure, she has experienced bloody vaginal discharge but denies symptoms prior to procedure.         Review of patient's allergies indicates:  No Known Allergies  Past Medical History:   Diagnosis Date    ADD (attention deficit disorder)     Anxiety     Kidney stones      Past Surgical History:   Procedure Laterality Date    COSMETIC SURGERY      rhinoplasty     Family History   Problem Relation Age of Onset    Heart disease Mother     Cancer Maternal Grandfather         Prostate     Eczema Sister     Melanoma Neg Hx     Psoriasis Neg Hx     Lupus Neg Hx      Social History     Tobacco Use    Smoking status: Never Smoker    Smokeless tobacco: Never Used   Substance Use Topics    Alcohol use: Yes     Comment: occasional    Drug use: No     Review of Systems    Constitutional: Negative for chills and fever.   HENT: Negative for congestion and sore throat.    Eyes: Negative for pain.   Respiratory: Negative for chest tightness and shortness of breath.    Cardiovascular: Negative for chest pain.   Gastrointestinal: Positive for nausea and vomiting. Negative for abdominal pain and diarrhea.   Genitourinary: Positive for flank pain, vaginal bleeding and vaginal discharge. Negative for dysuria and hematuria.   Musculoskeletal: Positive for back pain.   Skin: Negative for rash.   Neurological: Negative for weakness and headaches.       Physical Exam     Initial Vitals [03/06/20 2049]   BP Pulse Resp Temp SpO2   136/83 76 14 97.7 °F (36.5 °C) 100 %      MAP       --         Physical Exam    Nursing note and vitals reviewed.  Constitutional: She appears well-developed and well-nourished. She is not diaphoretic. No distress.   HENT:   Head: Normocephalic and atraumatic.   Mouth/Throat: Oropharynx is clear and moist.   Eyes: Conjunctivae and EOM are normal. Pupils are equal, round, and reactive to light.   Neck: Normal range of motion. Neck supple.   Cardiovascular: Normal rate, regular rhythm, normal heart sounds and intact distal pulses. Exam reveals no gallop and no friction rub.    No murmur heard.  Pulmonary/Chest: Breath sounds normal. She has no wheezes. She has no rhonchi. She has no rales.   Abdominal: Soft. Bowel sounds are normal. There is no tenderness. There is CVA tenderness.   Musculoskeletal: Normal range of motion. She exhibits no edema or tenderness.   Neurological: She is alert and oriented to person, place, and time. She has normal strength. GCS score is 15. GCS eye subscore is 4. GCS verbal subscore is 5. GCS motor subscore is 6.   Skin: Skin is warm and dry. Capillary refill takes less than 2 seconds.   Psychiatric: She has a normal mood and affect. Her behavior is normal. Judgment and thought content normal.         ED Course   Procedures  Labs Reviewed    URINALYSIS, REFLEX TO URINE CULTURE - Abnormal; Notable for the following components:       Result Value    Occult Blood UA 1+ (*)     All other components within normal limits    Narrative:     Preferred Collection Type->Urine, Clean Catch   CBC W/ AUTO DIFFERENTIAL   BASIC METABOLIC PANEL   URINALYSIS MICROSCOPIC    Narrative:     Preferred Collection Type->Urine, Clean Catch   POCT URINE PREGNANCY             Medical Decision Making:   History:   Old Medical Records: I decided to obtain old medical records.  Initial Assessment:   Emergent evaluation of a 29-year-old female who presents the emergency department with complaints of intermittent, sharp left flank pain began yesterday.  She has tried Norco, ibuprofen Phenergan at with minimal relief.  Patient is afebrile, hemodynamically stable, nontoxic appearing.  Will order labs, imaging, IV fluids, Toradol, Zofran and reassess.  Differential Diagnosis:   Differential diagnosis includes but is not limited to nephrolithiasis, pyelonephritis, urinary tract infection, musculoskeletal pain.  Clinical Tests:   Lab Tests: Ordered and Reviewed  Radiological Study: Ordered and Reviewed  ED Management:  Labs, imaging, and urinalysis are pending. Patient does report improvement in symptoms with toradol and zofran.   Due to shift change, will discuss case with fellow ED provider who will continue to follow until labs and imaging are complete. Final disposition pending results.                   Attending Attestation:     Physician Attestation Statement for NP/PA:   I discussed this assessment and plan of this patient with the NP/PA, but I did not personally examine the patient. The face to face encounter was performed by the NP/PA.                                Clinical Impression:     1. Left flank pain                ED Disposition Condition    Discharge Stable        ED Prescriptions     Medication Sig Dispense Start Date End Date Auth. Provider    tamsulosin (FLOMAX)  0.4 mg Cap Take 1 capsule (0.4 mg total) by mouth once daily. 30 capsule 3/7/2020 4/6/2020 Andressa Pedro PA-C    ondansetron (ZOFRAN-ODT) 4 MG TbDL Take 1 tablet (4 mg total) by mouth every 6 (six) hours as needed (nausea). 15 tablet 3/7/2020  Andressa Pedro PA-C    ketorolac (TORADOL) 10 mg tablet Take 1 tablet (10 mg total) by mouth every 6 (six) hours as needed for Pain. 30 tablet 3/7/2020  Andressa Pedro PA-C        Follow-up Information     Follow up With Specialties Details Why Contact Info Additional Information    [facility] 4th Floor Urology Schedule an appointment as soon as possible for a visit in 1 week  [address]  [facility] [address]  [phone] [facility] - 4th Floor                                     Pao Iverson PA-C  03/07/20 0014       Don Gomez III, MD  03/15/20 1793

## 2020-03-07 NOTE — DISCHARGE INSTRUCTIONS
Your CT scan did not show a kidney stone, but there was some haziness around your kidney that could be tiny stones.  Your urine has a small amount of blood in it but otherwise her labs appear normal. I suspect that you did have a kidney stone.  I am prescribing Flomax to help the stone pass, Toradol for pain and Zofran for nausea.    Please schedule an appointment with your urologist for follow-up.  If you start to have severe pain, persistent vomiting or fever please return to the emergency department.

## 2020-04-15 RX ORDER — NITROFURANTOIN MACROCRYSTALS 50 MG/1
50 CAPSULE ORAL EVERY 6 HOURS
Qty: 30 CAPSULE | Refills: 4 | Status: SHIPPED | OUTPATIENT
Start: 2020-04-15 | End: 2020-07-15 | Stop reason: CLARIF

## 2020-04-15 NOTE — TELEPHONE ENCOUNTER
----- Message from Vivian Kwon MA sent at 4/15/2020  9:22 AM CDT -----  Contact: [facility]  [phone] (Phone)  Requesting a refill  on nitrofurantoin (MACRODANTIN) 50 MG capsule      [facility]pharm[facility] #[id number] - [place], [place] - [address] AT [facility] [phone] (Phone)  [phone] (Fax)

## 2020-07-14 ENCOUNTER — HOSPITAL ENCOUNTER (EMERGENCY)
Facility: HOSPITAL | Age: 30
Discharge: HOME | End: 2020-07-14
Attending: EMERGENCY MEDICINE
Payer: MEDICAID

## 2020-07-14 VITALS
SYSTOLIC BLOOD PRESSURE: 126 MMHG | TEMPERATURE: 98 F | WEIGHT: 127 LBS | RESPIRATION RATE: 18 BRPM | HEART RATE: 71 BPM | BODY MASS INDEX: 21.68 KG/M2 | DIASTOLIC BLOOD PRESSURE: 80 MMHG | OXYGEN SATURATION: 100 % | HEIGHT: 64 IN

## 2020-07-14 DIAGNOSIS — R53.83 FATIGUE, UNSPECIFIED TYPE: ICD-10-CM

## 2020-07-14 DIAGNOSIS — Z71.89 EDUCATED ABOUT COVID-19 VIRUS INFECTION: ICD-10-CM

## 2020-07-14 DIAGNOSIS — R43.9 SMELL OR TASTE SENSATION DISTURBANCE: Primary | ICD-10-CM

## 2020-07-14 PROCEDURE — U0003 INFECTIOUS AGENT DETECTION BY NUCLEIC ACID (DNA OR RNA); SEVERE ACUTE RESPIRATORY SYNDROME CORONAVIRUS 2 (SARS-COV-2) (CORONAVIRUS DISEASE [COVID-19]), AMPLIFIED PROBE TECHNIQUE, MAKING USE OF HIGH THROUGHPUT TECHNOLOGIES AS DESCRIBED BY CMS-2020-01-R: HCPCS

## 2020-07-14 PROCEDURE — 99282 PR EMERGENCY DEPT VISIT,LEVEL II: ICD-10-PCS | Mod: ,,, | Performed by: PHYSICIAN ASSISTANT

## 2020-07-14 PROCEDURE — 99282 EMERGENCY DEPT VISIT SF MDM: CPT | Mod: ,,, | Performed by: PHYSICIAN ASSISTANT

## 2020-07-14 PROCEDURE — 99282 EMERGENCY DEPT VISIT SF MDM: CPT

## 2020-07-15 DIAGNOSIS — U07.1 COVID-19 VIRUS DETECTED: ICD-10-CM

## 2020-07-15 LAB — SARS-COV-2 RNA RESP QL NAA+PROBE: DETECTED

## 2020-07-15 NOTE — ED TRIAGE NOTES
Patient reports to the ED today with reports of COVID-19 concerns. Patient states loss of smell and taste for about a week. Patient states that she got a routine test done yesterday but states that she will not get the results back for about 10 days and states that she has a job interview tomorrow and would like to know results today.

## 2020-07-15 NOTE — ED PROVIDER NOTES
"Encounter Date: 7/14/2020       History     Chief Complaint   Patient presents with    COVID-19 Concerns     Pt presents saying she has no sense of taste or smell and fatigue x1 week. Pt with job interview this week and wishes to be tested for COVID.     The patient is a 29 year old female, who has a past medical history of ADD, anxiety, and Kidney stones. She presents to the ER for an emergent evaluation due to COVID-19 concerns. Patient states loss of smell and taste for about a week. She reports having mild fatigue. She denies any fever or chills. She denies any body aches, CP, cough, or SOB. She denies any N/V/D  She denies current pregnancy. She denies being immunosuppressed. Patient states that she had a routine test done yesterday but that she will not get the results back for about 10 days. She is requesting a "rapid Covid test". She states that she has a job interview tomorrow and needs a test result in order to start a new job.          Review of patient's allergies indicates:  No Known Allergies  Past Medical History:   Diagnosis Date    ADD (attention deficit disorder)     Anxiety     Kidney stones      Past Surgical History:   Procedure Laterality Date    COSMETIC SURGERY      rhinoplasty     Family History   Problem Relation Age of Onset    Heart disease Mother     Cancer Maternal Grandfather         Prostate     Eczema Sister     Melanoma Neg Hx     Psoriasis Neg Hx     Lupus Neg Hx      Social History     Tobacco Use    Smoking status: Never Smoker    Smokeless tobacco: Never Used   Substance Use Topics    Alcohol use: Yes     Comment: occasional    Drug use: No     Review of Systems   Constitutional: Positive for fatigue. Negative for activity change, appetite change, chills, diaphoresis and fever.   HENT: Positive for congestion and rhinorrhea. Negative for ear pain, sinus pain, sore throat and trouble swallowing.    Eyes: Negative for visual disturbance.   Respiratory: Negative for " cough, chest tightness, shortness of breath and wheezing.    Cardiovascular: Negative for chest pain and palpitations.   Gastrointestinal: Negative for abdominal pain, diarrhea, nausea and vomiting.   Genitourinary: Negative for decreased urine volume, difficulty urinating, dysuria, flank pain, frequency, menstrual problem, pelvic pain, urgency and vaginal bleeding.   Musculoskeletal: Negative for back pain, myalgias, neck pain and neck stiffness.   Skin: Negative for color change and rash.   Allergic/Immunologic: Negative for immunocompromised state.   Neurological: Negative for dizziness, syncope, weakness, light-headedness, numbness and headaches.   Psychiatric/Behavioral: Negative for confusion.       Physical Exam     Initial Vitals [07/14/20 2013]   BP Pulse Resp Temp SpO2   126/80 71 18 98.4 °F (36.9 °C) 100 %      MAP       --         Physical Exam    Nursing note and vitals reviewed.  Constitutional: She appears well-developed and well-nourished. She is not diaphoretic. No distress.   HENT:   Head: Normocephalic.   Mouth/Throat: Oropharynx is clear and moist.   Eyes: Conjunctivae are normal.   Neck: Neck supple.   Cardiovascular: Normal rate.   Pulmonary/Chest: No respiratory distress.   Abdominal: Soft. She exhibits no distension. There is no abdominal tenderness.   Musculoskeletal: Normal range of motion.   Neurological: She is alert and oriented to person, place, and time. She has normal strength. No sensory deficit.   Skin: Skin is warm and dry. No rash noted.   Psychiatric: She has a normal mood and affect.         ED Course   Procedures  Labs Reviewed   SARS-COV-2 (COVID-19) QUALITATIVE PCR          Imaging Results    None          Medical Decision Making:   Initial Assessment:   Pt c/o fatigue, decreased taste and smell x 1 week. Had covid test at outside site yesterday, result unknown, but wants rapid test so she can start a new job.   Differential Diagnosis:   Covid positive, Covid negative, URI,  etc   Clinical Tests:   Lab Tests: Ordered  ED Management:  I discussed the case in detail with the ER attending physician   Routine covid specimen collected and sent to lab   Pt given Covid precaution instructions   Pt advised to utilize [facility] for test results   Pt advised to take Tylenol as directed as needed   Pt advised to rest and hydrate   Pt advised to return to the ER promptly if worse in any way                                  Clinical Impression:       ICD-10-CM ICD-9-CM   1. Smell or taste sensation disturbance  R43.9 781.1   2. Educated About Covid-19 Virus Infection  Z71.89    3. Fatigue, unspecified type  R53.83 780.79         Disposition:   Disposition: Discharged  Condition: Stable     ED Disposition Condition    Discharge Stable        ED Prescriptions     None        Follow-up Information     Follow up With Specialties Details Why Contact Info [facility]  If symptoms worsen in any way. Follow up with your primary care physician as needed. [address]  [facility] [address]  [phone]                                     Jesus Rodriguez PA-C  07/15/20 0334

## 2020-07-15 NOTE — ED NOTES
Patient identifiers verified and correct for Gypsy Rivera  LOC: The patient is awake, alert and aware of environment with an appropriate affect, the patient is oriented x 3 and speaking appropriately.   APPEARANCE: Patient appears comfortable and in no acute distress, patient is clean and well groomed.  SKIN: The skin is warm and dry, color consistent with ethnicity, patient has normal skin turgor and moist mucus membranes, skin intact, no breakdown or bruising noted.   MUSCULOSKELETAL: Patient moving all extremities spontaneously, no swelling noted. Reports generalized fatigue   RESPIRATORY: Airway is open and patent, respirations are spontaneous, patient has a normal effort and rate, no accessory muscle use noted, pt placed on continuous pulse ox with O2 sats noted at 97% on room air.  CARDIAC: Denies chest pain  no edema noted, capillary refill < 3 seconds.   GASTRO: Soft and non tender to palpation, no distention noted, normoactive bowel sounds present in all four quadrants. Pt states bowel movements have been regular.  : Pt denies any pain or frequency with urination.  NEURO: Pt opens eyes spontaneously, behavior appropriate to situation, follows commands, facial expression symmetrical, bilateral hand grasp equal and even, purposeful motor response noted, normal sensation in all extremities when touched with a finger. Reports lack of smell and taste

## 2020-07-21 ENCOUNTER — HOSPITAL ENCOUNTER (EMERGENCY)
Facility: HOSPITAL | Age: 30
Discharge: HOME | End: 2020-07-21
Attending: EMERGENCY MEDICINE
Payer: MEDICAID

## 2020-07-21 VITALS
TEMPERATURE: 98 F | BODY MASS INDEX: 21.68 KG/M2 | OXYGEN SATURATION: 99 % | RESPIRATION RATE: 18 BRPM | HEART RATE: 74 BPM | HEIGHT: 64 IN | WEIGHT: 127 LBS | SYSTOLIC BLOOD PRESSURE: 141 MMHG | DIASTOLIC BLOOD PRESSURE: 88 MMHG

## 2020-07-21 DIAGNOSIS — Z00.00 GENERAL MEDICAL EXAM: Primary | ICD-10-CM

## 2020-07-21 PROCEDURE — 99281 EMR DPT VST MAYX REQ PHY/QHP: CPT

## 2020-07-21 PROCEDURE — 99282 EMERGENCY DEPT VISIT SF MDM: CPT | Mod: ,,, | Performed by: EMERGENCY MEDICINE

## 2020-07-21 PROCEDURE — 99282 PR EMERGENCY DEPT VISIT,LEVEL II: ICD-10-PCS | Mod: ,,, | Performed by: EMERGENCY MEDICINE

## 2020-07-21 NOTE — ED NOTES
Gypsy Rivera, a 29 y.o. female presents to the ED w/ complaint of requesting covid retesting. Still lacks smell and taste.     Triage note:  Chief Complaint   Patient presents with    COVID-19 Concerns     covid symptoms 2 weeks ago, tested + 6d ago, wanting to get retested for job interview, taste and smell not back to normal     Review of patient's allergies indicates:  No Known Allergies  Past Medical History:   Diagnosis Date    ADD (attention deficit disorder)     Anxiety     Kidney stones      Adult Physical Assessment  LOC: Gypsy Rivera, 29 y.o. female verified via two identifiers.  The patient is awake, alert, oriented and speaking appropriately at this time.  APPEARANCE: Patient resting comfortably and appears to be in no acute distress at this time. Patient is clean and well groomed, patient's clothing is properly fastened.  SKIN:The skin is warm and dry, color consistent with ethnicity, patient has normal skin turgor and moist mucus membranes, skin intact, no breakdown or brusing noted.  MUSCULOSKELETAL: Patient moving all extremities well, no obvious swelling or deformities noted.  RESPIRATORY: Airway is open and patent, respirations are spontaneous, patient has a normal effort and rate, no accessory muscle use noted.  CARDIAC: Patient has a normal rate and rhythm, no periphreal edema noted in any extremity, capillary refill < 3 seconds in all extremities  ABDOMEN: Soft and non tender to palpation, no abdominal distention noted. Bowel sounds present in all four quadrants.  NEUROLOGIC: Eyes open spontaneously, behavior appropriate to situation, follows commands, facial expression symmetrical, bilateral hand grasp equal and even, purposeful motor response noted, normal sensation in all extremities when touched with a finger.

## 2020-07-21 NOTE — ED PROVIDER NOTES
Source of History:  Patient    Chief complaint:  COVID-19 Concerns (covid symptoms 2 weeks ago, tested + 6d ago, wanting to get retested for job interview, taste and smell not back to normal)    HPI:  Gypsy Rivera is a 29 y.o. female with history of recent diagnosis of COVID-19 presenting to emergency department with request for repeat testing.    Patient was diagnosed 6 days ago.  She does not have fevers, cough, or shortness of breath but she still does not have her senses of taste and smell back.  She is requesting repeat COVID testing for a job interview.  No other complaints at this time.    ROS: As per HPI and below:  Review of Systems   Constitutional: Negative for fever.   Respiratory: Negative for cough and shortness of breath.    Cardiovascular: Negative for chest pain.   Gastrointestinal: Negative for vomiting.     Review of patient's allergies indicates:  No Known Allergies    No current facility-administered medications on file prior to encounter.      Current Outpatient Medications on File Prior to Encounter   Medication Sig Dispense Refill    cetirizine (ZYRTEC) 10 MG tablet Take 10 mg by mouth once daily.      clonazePAM (KLONOPIN) 0.5 MG tablet Take 0.5 mg by mouth 2 (two) times daily as needed for Anxiety.      DULoxetine (CYMBALTA) 30 MG capsule Take 60 mg by mouth nightly.      ketorolac (TORADOL) 10 mg tablet Take 1 tablet (10 mg total) by mouth every 6 (six) hours as needed for Pain. 30 tablet 0    levonorgestrel-ethinyl estradiol (SEASONALE) 0.15 mg-30 mcg per tablet Take 1 tablet by mouth once daily.      methylphenidate HCl (CONCERTA) 18 MG CR tablet Take 18 mg by mouth every morning.      propranolol (INDERAL) 10 MG tablet Take 10 mg by mouth daily as needed.      traZODone (DESYREL) 50 MG tablet Take 50 mg by mouth every evening.         PMH:  As per HPI and below:  Past Medical History:   Diagnosis Date    ADD (attention deficit disorder)     Anxiety     Kidney stones       Past Surgical History:   Procedure Laterality Date    COSMETIC SURGERY      rhinoplasty       Social History     Socioeconomic History    Marital status: Single     Spouse name: Not on file    Number of children: Not on file    Years of education: Not on file    Highest education level: Not on file   Occupational History     Employer: [employer]   Social Needs    Financial resource strain: Not on file    Food insecurity     Worry: Not on file     Inability: Not on file    Transportation needs     Medical: Not on file     Non-medical: Not on file   Tobacco Use    Smoking status: Never Smoker    Smokeless tobacco: Never Used   Substance and Sexual Activity    Alcohol use: Yes     Comment: occasional    Drug use: No    Sexual activity: Yes     Partners: Male   Lifestyle    Physical activity     Days per week: Not on file     Minutes per session: Not on file    Stress: Not on file   Relationships    Social connections     Talks on phone: Not on file     Gets together: Not on file     Attends [employer] service: Not on file     Active member of club or organization: Not on file     Attends meetings of clubs or organizations: Not on file     Relationship status: Not on file   Other Topics Concern    Are you pregnant or think you may be? Not Asked    Breast-feeding Not Asked   Social History Narrative    Graduated from [employer] in international business studies. She has an apartment in [place]. She plans to attend law school at [employer]. Her family lives in [place]. Her mom is practice manager for the Cardiovascular surgeons at [employer]. She is the oldest of soon to 8 children.        Family History   Problem Relation Age of Onset    Heart disease Mother     Cancer Maternal Grandfather         Prostate     Eczema Sister     Melanoma Neg Hx     Psoriasis Neg Hx     Lupus Neg Hx        Physical Exam:      Vitals:    07/21/20 1708   BP: (!) 141/88   Pulse: 74   Resp: 18   Temp: 98.4 °F  (36.9 °C)     Gen: No acute distress.  Mental Status:  Alert and oriented.  Appropriate, conversant.  Skin: Warm, dry. No rashes seen.  Pulm: No increased work of breathing.  No significant tachypnea.  No audible stridor or wheezing.  No conversational dyspnea.  Auscultation limited secondary to PPE.  CV: Regular rate. Regular rhythm.  Neuro: Awake. Speech normal. No focal neuro deficit observed.    Laboratory Studies:  Labs Reviewed - No data to display     Chart reviewed.     Imaging Results    None         Medications Given:  Medications - No data to display      MDM:    29 y.o. female with request for repeat COVID testing.  She is afebrile, stable, nontoxic with normal work of breathing.  I explained to the patient that there is a hard stop in our ordering system that will not allow me to obtain a repeat COVID swab an individual who has had a positive swab within the past 7 days.  Therefore I am unable to test her.  She has no signs or symptoms to suggest worsening illness.  I recommended follow up at a community site, and primary care follow-up.    Diagnostic Impression:    1. General medical exam         ED Disposition Condition    Discharge Stable        ED Prescriptions     None        Follow-up Information     Follow up With Specialties Details Why Contact Info    Your primary care doctor  Schedule an appointment as soon as possible for a visit in 1 week                          Patient and/or family understands the plan and is in agreement, verbalized understanding, questions answered    Aida Harrell MD  Emergency Medicine       Aida Harrell MD  07/21/20 2701

## 2020-07-24 ENCOUNTER — HOSPITAL ENCOUNTER (EMERGENCY)
Facility: HOSPITAL | Age: 30
Discharge: HOME | End: 2020-07-24
Attending: EMERGENCY MEDICINE
Payer: MEDICAID

## 2020-07-24 VITALS
HEART RATE: 68 BPM | WEIGHT: 127 LBS | TEMPERATURE: 98 F | DIASTOLIC BLOOD PRESSURE: 88 MMHG | RESPIRATION RATE: 16 BRPM | OXYGEN SATURATION: 100 % | SYSTOLIC BLOOD PRESSURE: 142 MMHG | BODY MASS INDEX: 21.68 KG/M2 | HEIGHT: 64 IN

## 2020-07-24 DIAGNOSIS — U07.1 COVID-19 VIRUS INFECTION: Primary | ICD-10-CM

## 2020-07-24 PROCEDURE — 99282 PR EMERGENCY DEPT VISIT,LEVEL II: ICD-10-PCS | Mod: ,,, | Performed by: EMERGENCY MEDICINE

## 2020-07-24 PROCEDURE — 99282 EMERGENCY DEPT VISIT SF MDM: CPT

## 2020-07-24 PROCEDURE — 99282 EMERGENCY DEPT VISIT SF MDM: CPT | Mod: ,,, | Performed by: EMERGENCY MEDICINE

## 2020-07-24 NOTE — ED PROVIDER NOTES
Encounter Date: 7/24/2020    SCRIBE #1 NOTE: I, Radha Delgado, am scribing for, and in the presence of,  Yovanny Wilkinson III, MD. I have scribed the entire note.       History     Chief Complaint   Patient presents with    COVID-19 Concerns     Covid + 10 days ago.  Pt never had fever, only loss of taste and smell.  Pt reports still with slight symptoms, but better.  Needs retest to negative for a job interview.     Time patient was seen by the provider: 12:02 PM      The patient is a 29 y.o. female with co-morbidities including: ADD, anxiety, who presents to the ED for COVID-19 retest. The patient was tested positive for COVID 10 days ago. She had a loss of smell and taste which has improved. No fever, chills, cough, congestion, chest pain, shortness of breath. Here for retesting for her job.        The history is provided by the patient and medical records.     Review of patient's allergies indicates:  No Known Allergies  Past Medical History:   Diagnosis Date    ADD (attention deficit disorder)     Anxiety     Kidney stones      Past Surgical History:   Procedure Laterality Date    COSMETIC SURGERY      rhinoplasty     Family History   Problem Relation Age of Onset    Heart disease Mother     Cancer Maternal Grandfather         Prostate     Eczema Sister     Melanoma Neg Hx     Psoriasis Neg Hx     Lupus Neg Hx      Social History     Tobacco Use    Smoking status: Never Smoker    Smokeless tobacco: Never Used   Substance Use Topics    Alcohol use: Yes     Comment: occasional    Drug use: No     Review of Systems   Constitutional: Negative for chills and fever.   HENT: Negative for congestion.    Respiratory: Negative for cough and shortness of breath.    Cardiovascular: Negative for chest pain.       Physical Exam     Initial Vitals [07/24/20 1140]   BP Pulse Resp Temp SpO2   (!) 142/88 68 16 98.4 °F (36.9 °C) 100 %      MAP       --         Physical Exam    Nursing note and vitals  reviewed.  Constitutional: She appears well-developed and well-nourished. She is not diaphoretic. No distress.   HENT:   Head: Normocephalic and atraumatic.   Eyes: EOM are normal. Pupils are equal, round, and reactive to light.   Neck: Normal range of motion. Neck supple.   Musculoskeletal: Normal range of motion. No tenderness or edema.   Neurological: She is alert and oriented to person, place, and time. She has normal strength. No cranial nerve deficit or sensory deficit.   Skin: Skin is warm and dry. No rash noted. No erythema.   Psychiatric: She has a normal mood and affect. Her behavior is normal. Judgment and thought content normal.         ED Course   Procedures  Labs Reviewed - No data to display       Imaging Results    None          Medical Decision Making:   History:   Old Medical Records: I decided to obtain old medical records.  Initial Assessment:   Patient here for retesting for her job. Unfortunately due to the hospital's policy I can not retest her. I attempted to give her resources to other places to get retested. Will discharge.             Scribe Attestation:   Scribe #1: I performed the above scribed service and the documentation accurately describes the services I performed. I attest to the accuracy of the note.    Attending Attestation:           Physician Attestation for Scribe:  Physician Attestation Statement for Scribe #1: I, Yovanny Wilkinson III, MD, reviewed documentation, as scribed by Radha Delgado in my presence, and it is both accurate and complete.                               Clinical Impression:       ICD-10-CM ICD-9-CM   1. COVID-19 virus infection  U07.1          Disposition:   Disposition: Discharged  Condition: Stable     ED Disposition Condition    Discharge Stable        ED Prescriptions     None        Follow-up Information     Follow up With Specialties Details Why Contact Info Additional Information    [facility] Internal Medicine Schedule an  appointment as soon as possible for a visit in 3 days  [address]  [facility] [address] [phone] [facility].                        I, Dr. Yovanny Wilkinson III, personally performed the services described in this documentation. All medical record entries made by the scribe were at my direction and in my presence.  I have reviewed the chart and agree that the record reflects my personal performance and is accurate and complete. Yovanny Wilkinson III, MD.  10:31 PM 07/24/2020               Yovanny Wilkinson III, MD  07/24/20 1316       Yovanny Wilkinson III, MD  07/24/20 9643

## 2020-07-24 NOTE — ED TRIAGE NOTES
Pt arrived from home with c/o needing a negative COVID test before going on Job interview. Tested positive for COVID 10 days ago. Had loss of smell and taste but has resolved. No cough, SOB, fever, chills, nausea, emesis or diarrhea. Denies pain.

## 2021-01-18 ENCOUNTER — HOSPITAL ENCOUNTER (EMERGENCY)
Facility: HOSPITAL | Age: 31
Discharge: HOME | End: 2021-01-18
Attending: EMERGENCY MEDICINE
Payer: MEDICAID

## 2021-01-18 VITALS
HEIGHT: 64 IN | WEIGHT: 125 LBS | DIASTOLIC BLOOD PRESSURE: 92 MMHG | HEART RATE: 81 BPM | RESPIRATION RATE: 18 BRPM | SYSTOLIC BLOOD PRESSURE: 133 MMHG | BODY MASS INDEX: 21.34 KG/M2 | TEMPERATURE: 98 F | OXYGEN SATURATION: 99 %

## 2021-01-18 DIAGNOSIS — Z20.822 COVID-19 VIRUS NOT DETECTED: Primary | ICD-10-CM

## 2021-01-18 LAB
CTP QC/QA: YES
SARS-COV-2 RDRP RESP QL NAA+PROBE: NEGATIVE

## 2021-01-18 PROCEDURE — 99284 PR EMERGENCY DEPT VISIT,LEVEL IV: ICD-10-PCS | Mod: CS,,, | Performed by: PHYSICIAN ASSISTANT

## 2021-01-18 PROCEDURE — 99282 EMERGENCY DEPT VISIT SF MDM: CPT | Mod: 25

## 2021-01-18 PROCEDURE — U0002 COVID-19 LAB TEST NON-CDC: HCPCS | Performed by: PHYSICIAN ASSISTANT

## 2021-01-18 PROCEDURE — 99284 EMERGENCY DEPT VISIT MOD MDM: CPT | Mod: CS,,, | Performed by: PHYSICIAN ASSISTANT

## 2021-04-29 ENCOUNTER — OFFICE VISIT (OUTPATIENT)
Dept: UROLOGY | Facility: CLINIC | Age: 31
End: 2021-04-29
Payer: COMMERCIAL

## 2021-04-29 ENCOUNTER — PATIENT MESSAGE (OUTPATIENT)
Dept: UROLOGY | Facility: CLINIC | Age: 31
End: 2021-04-29

## 2021-04-29 ENCOUNTER — HOSPITAL ENCOUNTER (OUTPATIENT)
Dept: RADIOLOGY | Facility: HOSPITAL | Age: 31
Discharge: HOME | End: 2021-04-29
Attending: NURSE PRACTITIONER
Payer: COMMERCIAL

## 2021-04-29 VITALS
WEIGHT: 119 LBS | HEIGHT: 64 IN | BODY MASS INDEX: 20.32 KG/M2 | DIASTOLIC BLOOD PRESSURE: 83 MMHG | HEART RATE: 99 BPM | SYSTOLIC BLOOD PRESSURE: 117 MMHG

## 2021-04-29 DIAGNOSIS — G89.29 CHRONIC RIGHT-SIDED LOW BACK PAIN WITHOUT SCIATICA: ICD-10-CM

## 2021-04-29 DIAGNOSIS — M54.50 CHRONIC RIGHT-SIDED LOW BACK PAIN WITHOUT SCIATICA: ICD-10-CM

## 2021-04-29 DIAGNOSIS — Z87.442 HISTORY OF KIDNEY STONES: ICD-10-CM

## 2021-04-29 DIAGNOSIS — N39.0 RECURRENT UTI: ICD-10-CM

## 2021-04-29 DIAGNOSIS — N39.0 RECURRENT UTI: Primary | ICD-10-CM

## 2021-04-29 DIAGNOSIS — R39.15 URINARY URGENCY: ICD-10-CM

## 2021-04-29 LAB
BILIRUB SERPL-MCNC: ABNORMAL MG/DL
BLOOD URINE, POC: ABNORMAL
CLARITY, POC UA: CLEAR
COLOR, POC UA: YELLOW
GLUCOSE UR QL STRIP: ABNORMAL
KETONES UR QL STRIP: ABNORMAL
LEUKOCYTE ESTERASE URINE, POC: ABNORMAL
NITRITE, POC UA: ABNORMAL
PH, POC UA: 6
PROTEIN, POC: ABNORMAL
SPECIFIC GRAVITY, POC UA: 1.01
UROBILINOGEN, POC UA: ABNORMAL

## 2021-04-29 PROCEDURE — 99214 OFFICE O/P EST MOD 30 MIN: CPT | Mod: 25,S$GLB,, | Performed by: NURSE PRACTITIONER

## 2021-04-29 PROCEDURE — 99999 PR PBB SHADOW E&M-EST. PATIENT-LVL III: CPT | Mod: PBBFAC,,, | Performed by: NURSE PRACTITIONER

## 2021-04-29 PROCEDURE — 76770 US EXAM ABDO BACK WALL COMP: CPT | Mod: TC

## 2021-04-29 PROCEDURE — 76770 US RETROPERITONEAL COMPLETE: ICD-10-PCS | Mod: 26,,, | Performed by: RADIOLOGY

## 2021-04-29 PROCEDURE — 99214 PR OFFICE/OUTPT VISIT, EST, LEVL IV, 30-39 MIN: ICD-10-PCS | Mod: 25,S$GLB,, | Performed by: NURSE PRACTITIONER

## 2021-04-29 PROCEDURE — 99999 PR PBB SHADOW E&M-EST. PATIENT-LVL III: ICD-10-PCS | Mod: PBBFAC,,, | Performed by: NURSE PRACTITIONER

## 2021-04-29 PROCEDURE — 81002 URINALYSIS NONAUTO W/O SCOPE: CPT | Mod: S$GLB,,, | Performed by: NURSE PRACTITIONER

## 2021-04-29 PROCEDURE — 76770 US EXAM ABDO BACK WALL COMP: CPT | Mod: 26,,, | Performed by: RADIOLOGY

## 2021-04-29 PROCEDURE — 81002 POCT URINE DIPSTICK WITHOUT MICROSCOPE: ICD-10-PCS | Mod: S$GLB,,, | Performed by: NURSE PRACTITIONER

## 2021-04-29 RX ORDER — DEXTROAMPHETAMINE SACCHARATE, AMPHETAMINE ASPARTATE MONOHYDRATE, DEXTROAMPHETAMINE SULFATE AND AMPHETAMINE SULFATE 7.5; 7.5; 7.5; 7.5 MG/1; MG/1; MG/1; MG/1
CAPSULE, EXTENDED RELEASE ORAL EVERY MORNING
COMMUNITY
Start: 2021-04-15 | End: 2023-06-05

## 2021-04-29 RX ORDER — OXYBUTYNIN CHLORIDE 5 MG/1
5 TABLET, EXTENDED RELEASE ORAL DAILY
Qty: 90 TABLET | Refills: 3 | Status: SHIPPED | OUTPATIENT
Start: 2021-04-29 | End: 2023-06-05

## 2021-04-29 RX ORDER — LEVONORGESTREL / ETHINYL ESTRADIOL AND ETHINYL ESTRADIOL 150-30(84)
KIT ORAL
COMMUNITY
Start: 2020-01-20 | End: 2023-06-05

## 2021-04-29 RX ORDER — NITROFURANTOIN 25; 75 MG/1; MG/1
CAPSULE ORAL
Qty: 20 CAPSULE | Refills: 3 | Status: SHIPPED | OUTPATIENT
Start: 2021-04-29 | End: 2021-04-29 | Stop reason: ALTCHOICE

## 2021-04-29 RX ORDER — BUPROPION HYDROCHLORIDE 300 MG/1
TABLET ORAL
COMMUNITY
Start: 2021-01-04 | End: 2023-06-05

## 2021-04-29 RX ORDER — CEPHALEXIN 250 MG/1
CAPSULE ORAL
Qty: 30 CAPSULE | Refills: 4 | Status: SHIPPED | OUTPATIENT
Start: 2021-04-29 | End: 2022-04-29

## 2021-04-29 RX ORDER — DICLOFENAC SODIUM 10 MG/G
2 GEL TOPICAL DAILY PRN
Qty: 1 TUBE | Refills: 2 | Status: ON HOLD | OUTPATIENT
Start: 2021-04-29 | End: 2023-12-01 | Stop reason: HOSPADM

## 2021-04-30 ENCOUNTER — PATIENT MESSAGE (OUTPATIENT)
Dept: UROLOGY | Facility: CLINIC | Age: 31
End: 2021-04-30

## 2021-04-30 DIAGNOSIS — M54.50 BILATERAL LOW BACK PAIN WITHOUT SCIATICA, UNSPECIFIED CHRONICITY: ICD-10-CM

## 2021-04-30 DIAGNOSIS — N20.0 KIDNEY STONES: Primary | ICD-10-CM

## 2021-05-01 ENCOUNTER — HOSPITAL ENCOUNTER (OUTPATIENT)
Dept: RADIOLOGY | Facility: HOSPITAL | Age: 31
Discharge: HOME | End: 2021-05-01
Attending: NURSE PRACTITIONER
Payer: COMMERCIAL

## 2021-05-01 DIAGNOSIS — M54.50 BILATERAL LOW BACK PAIN WITHOUT SCIATICA, UNSPECIFIED CHRONICITY: ICD-10-CM

## 2021-05-01 DIAGNOSIS — N20.0 KIDNEY STONES: ICD-10-CM

## 2021-05-01 PROCEDURE — 74176 CT ABD & PELVIS W/O CONTRAST: CPT | Mod: 26,,, | Performed by: RADIOLOGY

## 2021-05-01 PROCEDURE — 74176 CT ABD & PELVIS W/O CONTRAST: CPT | Mod: TC

## 2021-05-01 PROCEDURE — 74176 CT RENAL STONE STUDY ABD PELVIS WO: ICD-10-PCS | Mod: 26,,, | Performed by: RADIOLOGY

## 2021-05-28 ENCOUNTER — PATIENT MESSAGE (OUTPATIENT)
Dept: UROLOGY | Facility: CLINIC | Age: 31
End: 2021-05-28

## 2023-02-01 ENCOUNTER — HOSPITAL ENCOUNTER (EMERGENCY)
Facility: HOSPITAL | Age: 33
Discharge: HOME | End: 2023-02-01
Attending: EMERGENCY MEDICINE
Payer: COMMERCIAL

## 2023-02-01 VITALS
RESPIRATION RATE: 16 BRPM | DIASTOLIC BLOOD PRESSURE: 75 MMHG | HEART RATE: 100 BPM | WEIGHT: 130 LBS | BODY MASS INDEX: 22.2 KG/M2 | OXYGEN SATURATION: 100 % | HEIGHT: 64 IN | SYSTOLIC BLOOD PRESSURE: 132 MMHG | TEMPERATURE: 98 F

## 2023-02-01 DIAGNOSIS — R11.2 NAUSEA AND VOMITING, UNSPECIFIED VOMITING TYPE: Primary | ICD-10-CM

## 2023-02-01 LAB
ALBUMIN SERPL BCP-MCNC: 4.4 G/DL (ref 3.5–5.2)
ALP SERPL-CCNC: 95 U/L (ref 55–135)
ALT SERPL W/O P-5'-P-CCNC: 16 U/L (ref 10–44)
ANION GAP SERPL CALC-SCNC: 15 MMOL/L (ref 8–16)
AST SERPL-CCNC: 19 U/L (ref 10–40)
B-HCG UR QL: NEGATIVE
BASOPHILS # BLD AUTO: 0.01 K/UL (ref 0–0.2)
BASOPHILS NFR BLD: 0.2 % (ref 0–1.9)
BILIRUB SERPL-MCNC: 0.8 MG/DL (ref 0.1–1)
BUN SERPL-MCNC: 9 MG/DL (ref 6–20)
CALCIUM SERPL-MCNC: 10 MG/DL (ref 8.7–10.5)
CHLORIDE SERPL-SCNC: 106 MMOL/L (ref 95–110)
CO2 SERPL-SCNC: 19 MMOL/L (ref 23–29)
CREAT SERPL-MCNC: 0.7 MG/DL (ref 0.5–1.4)
CTP QC/QA: YES
DIFFERENTIAL METHOD: NORMAL
EOSINOPHIL # BLD AUTO: 0 K/UL (ref 0–0.5)
EOSINOPHIL NFR BLD: 0.2 % (ref 0–8)
ERYTHROCYTE [DISTWIDTH] IN BLOOD BY AUTOMATED COUNT: 12.1 % (ref 11.5–14.5)
EST. GFR  (NO RACE VARIABLE): >60 ML/MIN/1.73 M^2
GLUCOSE SERPL-MCNC: 120 MG/DL (ref 70–110)
HCT VFR BLD AUTO: 39.6 % (ref 37–48.5)
HCV AB SERPL QL IA: NORMAL
HGB BLD-MCNC: 13 G/DL (ref 12–16)
HIV 1+2 AB+HIV1 P24 AG SERPL QL IA: NORMAL
IMM GRANULOCYTES # BLD AUTO: 0.02 K/UL (ref 0–0.04)
IMM GRANULOCYTES NFR BLD AUTO: 0.4 % (ref 0–0.5)
LIPASE SERPL-CCNC: 18 U/L (ref 4–60)
LYMPHOCYTES # BLD AUTO: 1.6 K/UL (ref 1–4.8)
LYMPHOCYTES NFR BLD: 29.3 % (ref 18–48)
MAGNESIUM SERPL-MCNC: 1.9 MG/DL (ref 1.6–2.6)
MCH RBC QN AUTO: 29.9 PG (ref 27–31)
MCHC RBC AUTO-ENTMCNC: 32.8 G/DL (ref 32–36)
MCV RBC AUTO: 91 FL (ref 82–98)
MONOCYTES # BLD AUTO: 0.3 K/UL (ref 0.3–1)
MONOCYTES NFR BLD: 5.1 % (ref 4–15)
NEUTROPHILS # BLD AUTO: 3.6 K/UL (ref 1.8–7.7)
NEUTROPHILS NFR BLD: 64.8 % (ref 38–73)
NRBC BLD-RTO: 0 /100 WBC
PLATELET # BLD AUTO: 293 K/UL (ref 150–450)
PMV BLD AUTO: 11.1 FL (ref 9.2–12.9)
POTASSIUM SERPL-SCNC: 3.7 MMOL/L (ref 3.5–5.1)
PROT SERPL-MCNC: 7.8 G/DL (ref 6–8.4)
RBC # BLD AUTO: 4.35 M/UL (ref 4–5.4)
SODIUM SERPL-SCNC: 140 MMOL/L (ref 136–145)
WBC # BLD AUTO: 5.5 K/UL (ref 3.9–12.7)

## 2023-02-01 PROCEDURE — 87389 HIV-1 AG W/HIV-1&-2 AB AG IA: CPT | Performed by: PHYSICIAN ASSISTANT

## 2023-02-01 PROCEDURE — 83735 ASSAY OF MAGNESIUM: CPT | Performed by: PHYSICIAN ASSISTANT

## 2023-02-01 PROCEDURE — 99284 EMERGENCY DEPT VISIT MOD MDM: CPT | Mod: 25

## 2023-02-01 PROCEDURE — 86803 HEPATITIS C AB TEST: CPT | Performed by: PHYSICIAN ASSISTANT

## 2023-02-01 PROCEDURE — 99284 EMERGENCY DEPT VISIT MOD MDM: CPT | Mod: ,,, | Performed by: PHYSICIAN ASSISTANT

## 2023-02-01 PROCEDURE — 96374 THER/PROPH/DIAG INJ IV PUSH: CPT

## 2023-02-01 PROCEDURE — 80053 COMPREHEN METABOLIC PANEL: CPT | Performed by: PHYSICIAN ASSISTANT

## 2023-02-01 PROCEDURE — 96361 HYDRATE IV INFUSION ADD-ON: CPT

## 2023-02-01 PROCEDURE — 81025 URINE PREGNANCY TEST: CPT | Performed by: PHYSICIAN ASSISTANT

## 2023-02-01 PROCEDURE — 99284 PR EMERGENCY DEPT VISIT,LEVEL IV: ICD-10-PCS | Mod: ,,, | Performed by: PHYSICIAN ASSISTANT

## 2023-02-01 PROCEDURE — 83690 ASSAY OF LIPASE: CPT | Performed by: PHYSICIAN ASSISTANT

## 2023-02-01 PROCEDURE — 25000003 PHARM REV CODE 250: Performed by: PHYSICIAN ASSISTANT

## 2023-02-01 PROCEDURE — 96375 TX/PRO/DX INJ NEW DRUG ADDON: CPT

## 2023-02-01 PROCEDURE — 63600175 PHARM REV CODE 636 W HCPCS: Performed by: PHYSICIAN ASSISTANT

## 2023-02-01 PROCEDURE — 85025 COMPLETE CBC W/AUTO DIFF WBC: CPT | Performed by: PHYSICIAN ASSISTANT

## 2023-02-01 RX ORDER — METOCLOPRAMIDE HYDROCHLORIDE 5 MG/ML
10 INJECTION INTRAMUSCULAR; INTRAVENOUS
Status: COMPLETED | OUTPATIENT
Start: 2023-02-01 | End: 2023-02-01

## 2023-02-01 RX ORDER — ONDANSETRON 4 MG/1
4 TABLET, ORALLY DISINTEGRATING ORAL EVERY 6 HOURS PRN
Qty: 12 TABLET | Refills: 0 | Status: SHIPPED | OUTPATIENT
Start: 2023-02-01 | End: 2023-06-05

## 2023-02-01 RX ORDER — LISDEXAMFETAMINE DIMESYLATE 70 MG/1
70 CAPSULE ORAL EVERY MORNING
Status: ON HOLD | COMMUNITY
Start: 2023-01-18 | End: 2023-12-01 | Stop reason: HOSPADM

## 2023-02-01 RX ORDER — DULOXETIN HYDROCHLORIDE 30 MG/1
30 CAPSULE, DELAYED RELEASE ORAL
COMMUNITY
Start: 2022-04-25 | End: 2023-06-05

## 2023-02-01 RX ORDER — ONDANSETRON 2 MG/ML
8 INJECTION INTRAMUSCULAR; INTRAVENOUS
Status: DISCONTINUED | OUTPATIENT
Start: 2023-02-01 | End: 2023-02-01

## 2023-02-01 RX ORDER — PROMETHAZINE HYDROCHLORIDE 25 MG/1
25 TABLET ORAL EVERY 6 HOURS PRN
Qty: 12 TABLET | Refills: 0 | Status: SHIPPED | OUTPATIENT
Start: 2023-02-01 | End: 2023-06-05

## 2023-02-01 RX ORDER — DROPERIDOL 2.5 MG/ML
0.62 INJECTION, SOLUTION INTRAMUSCULAR; INTRAVENOUS ONCE
Status: COMPLETED | OUTPATIENT
Start: 2023-02-01 | End: 2023-02-01

## 2023-02-01 RX ORDER — METHOCARBAMOL 750 MG/1
750 TABLET, FILM COATED ORAL EVERY 12 HOURS PRN
COMMUNITY
Start: 2023-01-07 | End: 2023-06-05

## 2023-02-01 RX ORDER — DIPHENHYDRAMINE HCL 50 MG
50 CAPSULE ORAL
Status: COMPLETED | OUTPATIENT
Start: 2023-02-01 | End: 2023-02-01

## 2023-02-01 RX ADMIN — SODIUM CHLORIDE, POTASSIUM CHLORIDE, SODIUM LACTATE AND CALCIUM CHLORIDE 1000 ML: 600; 310; 30; 20 INJECTION, SOLUTION INTRAVENOUS at 08:02

## 2023-02-01 RX ADMIN — SODIUM CHLORIDE, POTASSIUM CHLORIDE, SODIUM LACTATE AND CALCIUM CHLORIDE 1000 ML: 600; 310; 30; 20 INJECTION, SOLUTION INTRAVENOUS at 10:02

## 2023-02-01 RX ADMIN — DROPERIDOL 0.62 MG: 2.5 INJECTION, SOLUTION INTRAMUSCULAR; INTRAVENOUS at 09:02

## 2023-02-01 RX ADMIN — METOCLOPRAMIDE 10 MG: 5 INJECTION, SOLUTION INTRAMUSCULAR; INTRAVENOUS at 08:02

## 2023-02-01 RX ADMIN — DIPHENHYDRAMINE HYDROCHLORIDE 50 MG: 50 CAPSULE ORAL at 10:02

## 2023-02-01 NOTE — ED NOTES
Pt reports that she stopped her Cymbalta 2 days ago because she ran out.  Pt states that she has had light sensitivity and vomiting with intermittent abd pain.     LOC: The patient is awake, alert and aware of environment with an appropriate affect, the patient is oriented x 3 and speaking appropriately.  APPEARANCE: Patient resting comfortably and in no acute distress, patient is clean and well groomed, patient's clothing is properly fastened.  SKIN: The skin is warm and dry, color consistent with ethnicity, patient has normal skin turgor and moist mucus membranes, skin intact, no breakdown or bruising noted.  MUSCULOSKELETAL: Patient moving all extremities spontaneously, no obvious swelling or deformities noted.  RESPIRATORY: Airway is open and patent, respirations are spontaneous, patient has a normal effort and rate, no accessory muscle use noted.  ABDOMEN: Soft and non tender to palpation, no distention noted.

## 2023-02-01 NOTE — ED PROVIDER NOTES
Encounter Date: 2/1/2023       History     Chief Complaint   Patient presents with    Vomiting     Vomiting since yest stopped Cymbalta for few days      7:51 AM  Patient is a 32-year-old female with a history of ADD, anxiety, nephrolithiasis, chronic back pain status post surgery who presents to [facility] ED for emergent evaluation of nausea and vomiting.    Patient states her symptoms started yesterday around noon.  She has but been unable to keep any foods or fluids down.  She denies any abdominal pain or any other pain in general.  Has had hot flashes and goose bumps, but denies any fever.  Her bowel movement has been normal for her in the past week; LBM today while in the wait room.    To note, she has not had Cymbalta in 3 days because she ran out.  She was able to get her refill and took half of a dose this morning.  She normally takes 120 mg daily.  She is been on it for the past 6 months.  It was increased 1 month ago.  In addition she takes Percocet half of a 5 mg tablet twice a day since her back surgery.  States that she is trying to wean herself off.  Last dose yesterday.  She also takes Klonopin every morning.  She is tried Zofran without relief which prompted patient to come into the ED.  She is here with her aunt.    Denies any previous history of abdominal surgeries.  Admits to almost regular marijuana use for the past few months.  She has a medical marijuana card.  Has used it intermittently since the age of 15.    Review of patient's allergies indicates:  No Known Allergies  Past Medical History:   Diagnosis Date    ADD (attention deficit disorder)     Anxiety     Kidney stones      Past Surgical History:   Procedure Laterality Date    COSMETIC SURGERY      rhinoplasty     Family History   Problem Relation Age of Onset    Heart disease Mother     Cancer Maternal Grandfather         Prostate     Eczema Sister     Melanoma Neg Hx     Psoriasis Neg Hx     Lupus Neg Hx      Social History     Tobacco Use     Smoking status: Never    Smokeless tobacco: Never   Substance Use Topics    Alcohol use: Yes     Comment: occasional    Drug use: No     Review of Systems   Constitutional:  Positive for activity change, appetite change and chills. Negative for diaphoresis and fever.   HENT:  Negative for ear pain and sore throat.    Eyes:  Negative for pain.   Cardiovascular:  Negative for chest pain.   Gastrointestinal:  Positive for nausea and vomiting. Negative for abdominal pain, constipation and diarrhea.   Genitourinary:  Negative for flank pain.   Musculoskeletal:  Negative for back pain.   Skin:  Negative for rash.   Neurological:  Negative for headaches.   Hematological:  Does not bruise/bleed easily.     Physical Exam     Initial Vitals [02/01/23 0726]   BP Pulse Resp Temp SpO2   118/73 95 18 97.8 °F (36.6 °C) 99 %      MAP       --         Physical Exam    Vitals reviewed.  Constitutional: She appears well-developed and well-nourished. She is not diaphoretic. She is cooperative.  Non-toxic appearance. She does not have a sickly appearance. She does not appear ill. No distress. Face mask in place.   HENT:   Head: Normocephalic and atraumatic.   Nose: Nose normal. No nasal deformity. No epistaxis.   Mouth/Throat: Oropharynx is clear and moist. No trismus in the jaw. No oropharyngeal exudate, posterior oropharyngeal edema, posterior oropharyngeal erythema or tonsillar abscesses.   Eyes: Conjunctivae and EOM are normal. Right eye exhibits no nystagmus. Left eye exhibits no nystagmus. Pupils are equal.   Neck:   Normal range of motion.  Cardiovascular:  Normal rate and regular rhythm.           Pulmonary/Chest: No accessory muscle usage. No tachypnea. No respiratory distress. She has no wheezes.   Abdominal: Abdomen is soft. She exhibits no distension. There is no abdominal tenderness. There is no rebound and no guarding.   Musculoskeletal:         General: Normal range of motion.      Cervical back: Normal range of motion.       Right lower leg: No edema.      Left lower leg: No edema.     Neurological: She is alert. She has normal strength.   Skin: Skin is warm and dry. No erythema. No pallor.       ED Course   Procedures  Labs Reviewed   COMPREHENSIVE METABOLIC PANEL - Abnormal; Notable for the following components:       Result Value    CO2 19 (*)     Glucose 120 (*)     All other components within normal limits   HIV 1 / 2 ANTIBODY    Narrative:     Release to patient->Immediate   HEPATITIS C ANTIBODY    Narrative:     Release to patient->Immediate   CBC W/ AUTO DIFFERENTIAL   LIPASE   MAGNESIUM   POCT URINE PREGNANCY          Imaging Results    None          Medications   lactated ringers bolus 1,000 mL (1,000 mLs Intravenous New Bag 2/1/23 1047)   lactated ringers bolus 1,000 mL (0 mLs Intravenous Stopped 2/1/23 0913)   metoclopramide HCl injection 10 mg (10 mg Intravenous Given 2/1/23 0840)   droperidoL injection 0.625 mg (0.625 mg Intravenous Given 2/1/23 0937)   diphenhydrAMINE capsule 50 mg (50 mg Oral Given 2/1/23 1047)     Medical Decision Making:   History:   Old Medical Records: I decided to obtain old medical records.  Old Records Summarized: records from clinic visits.  Initial Assessment:   Patient is a 32-year-old female with a history of ADD, anxiety, nephrolithiasis, chronic back pain status post surgery who presents to [facility] for emergent evaluation of nausea and vomiting.  Differential Diagnosis:   Includes but is not limited to Cymbalta withdrawal, polypharmacy, electrolyte abnormalities, dehydration, DAKOTA, pregnancy.  Patient has a soft, nondistended, nontender abdomen.  She denies any abdominal pain whatsoever.  I have considered but doubt acute surgical abdomen.  Vitals stable.  Nontoxic appearing.  Doubt sepsis.  Clinical Tests:   Lab Tests: Ordered and Reviewed  ED Management:  Will initiate workup, give IV fluids for acute dehydration and IV anti emetics for acute nausea, and continue monitor.           ED  Course as of 02/01/23 1145   Wed Feb 01, 2023   0735 BP: 118/73 [CL]   0735 Temp: 97.8 °F (36.6 °C) [CL]   0735 Pulse: 95 [CL]   0735 Resp: 18 [CL]   0735 SpO2: 99 % [CL]   0825 WBC: 5.50 [CL]   0825 Hemoglobin: 13.0 [CL]   0825 Platelets: 293 [CL]   0826 Preg Test, Ur: Negative [CL]   0918 Sodium: 140 [CL]   0918 Potassium: 3.7 [CL]   0918 Glucose(!): 120 [CL]   0918 Creatinine: 0.7 [CL]   0918 BILIRUBIN TOTAL: 0.8 [CL]   0918 AST: 19 [CL]   0918 ALT: 16 [CL]   0918 Lipase: 18 [CL]   0918 Magnesium: 1.9 [CL]   1140 Labs without acute processes noting no signs of leukocytosis, DAKTOA, liver failure, electrolyte abnormalities.  Patient updated with results.  She did develop some side effects after the droperidol which resolved after more hydration and Benadryl.  She reports feeling improved.  She was p.o. challenged successfully.  I suspect that this could be a side effect of Cymbalta withdrawal since she has been on it for several months versus hyperemesis cannabis syndrome.  She does not need any further emergent labs or imaging at this time.  I have prescribed Zofran which she needed a refill on and Phenergan if Zofran does not help.  Stay hydrated.  Rest.  Return to ED precautions were given.  Patient and her and are comfortable with plan, and patient is stable for discharge. [CL]      ED Course User Index  [CL] Isabel Blanco PA-C                   Clinical Impression:   Final diagnoses:  [R11.2] Nausea and vomiting, unspecified vomiting type (Primary)        ED Disposition Condition    Discharge Stable          ED Prescriptions       Medication Sig Dispense Start Date End Date Auth. Provider    promethazine (PHENERGAN) 25 MG tablet Take 1 tablet (25 mg total) by mouth every 6 (six) hours as needed for Nausea. 12 tablet 2/1/2023 -- Isabel Blanco PA-C    ondansetron (ZOFRAN-ODT) 4 MG TbDL Take 1 tablet (4 mg total) by mouth every 6 (six) hours as needed (nausea). 12 tablet 2/1/2023 -- Isabel Blanco PA-C           Follow-up Information       Follow up With Specialties Details Why Contact Info Additional Information    [facility] Internal Medicine Schedule an appointment as soon as possible for a visit   [address]  [facility] [address] [phone] [facility] Please park in surface lot and check in at central registration desk    [facility] Emergency [facility] Emergency Medicine  If symptoms worsen [address]  [facility] [address] [phone]              Isabel Blanco PA-C  02/01/23 1146

## 2023-02-01 NOTE — Clinical Note
"Gypsy Chawlaavinash Rivera was seen and treated in our emergency department on 2/1/2023.  She may return to work on 02/06/2023.       If you have any questions or concerns, please don't hesitate to call.      Isabel Blanco PA-C"

## 2023-02-01 NOTE — DISCHARGE INSTRUCTIONS
You do not have signs of electrolyte abnormalities, anemia, kidney injury, liver failure.  Continue all of your medication.    Take Zofran for your nausea every 6 hours as needed.  You can take Phenergan if that does not help please wait 6 hours after your last dose.  Start soft/fluid diet (bland foods) and advance to solids as tolerated.   Stay hydrated by drinking plenty of fluids (2 liters for females and 3 liters for males on a daily basis).  Follow up with primary care physician.   Return to the ER for new or worsening symptoms.

## 2023-06-05 ENCOUNTER — OFFICE VISIT (OUTPATIENT)
Dept: OBSTETRICS AND GYNECOLOGY | Facility: CLINIC | Age: 33
End: 2023-06-05
Payer: COMMERCIAL

## 2023-06-05 VITALS
SYSTOLIC BLOOD PRESSURE: 104 MMHG | HEIGHT: 64 IN | WEIGHT: 124.44 LBS | DIASTOLIC BLOOD PRESSURE: 88 MMHG | BODY MASS INDEX: 21.24 KG/M2

## 2023-06-05 DIAGNOSIS — N91.2 AMENORRHEA: Primary | ICD-10-CM

## 2023-06-05 DIAGNOSIS — Z34.90 PREGNANCY, UNSPECIFIED GESTATIONAL AGE: ICD-10-CM

## 2023-06-05 LAB
B-HCG UR QL: POSITIVE
CTP QC/QA: YES

## 2023-06-05 PROCEDURE — 3008F PR BODY MASS INDEX (BMI) DOCUMENTED: ICD-10-PCS | Mod: CPTII,S$GLB,, | Performed by: STUDENT IN AN ORGANIZED HEALTH CARE EDUCATION/TRAINING PROGRAM

## 2023-06-05 PROCEDURE — 87088 URINE BACTERIA CULTURE: CPT | Performed by: STUDENT IN AN ORGANIZED HEALTH CARE EDUCATION/TRAINING PROGRAM

## 2023-06-05 PROCEDURE — 99214 PR OFFICE/OUTPT VISIT, EST, LEVL IV, 30-39 MIN: ICD-10-PCS | Mod: S$GLB,,, | Performed by: STUDENT IN AN ORGANIZED HEALTH CARE EDUCATION/TRAINING PROGRAM

## 2023-06-05 PROCEDURE — 1159F MED LIST DOCD IN RCRD: CPT | Mod: CPTII,S$GLB,, | Performed by: STUDENT IN AN ORGANIZED HEALTH CARE EDUCATION/TRAINING PROGRAM

## 2023-06-05 PROCEDURE — 3074F SYST BP LT 130 MM HG: CPT | Mod: CPTII,S$GLB,, | Performed by: STUDENT IN AN ORGANIZED HEALTH CARE EDUCATION/TRAINING PROGRAM

## 2023-06-05 PROCEDURE — 99999 PR PBB SHADOW E&M-EST. PATIENT-LVL III: ICD-10-PCS | Mod: PBBFAC,,, | Performed by: STUDENT IN AN ORGANIZED HEALTH CARE EDUCATION/TRAINING PROGRAM

## 2023-06-05 PROCEDURE — 1159F PR MEDICATION LIST DOCUMENTED IN MEDICAL RECORD: ICD-10-PCS | Mod: CPTII,S$GLB,, | Performed by: STUDENT IN AN ORGANIZED HEALTH CARE EDUCATION/TRAINING PROGRAM

## 2023-06-05 PROCEDURE — 3008F BODY MASS INDEX DOCD: CPT | Mod: CPTII,S$GLB,, | Performed by: STUDENT IN AN ORGANIZED HEALTH CARE EDUCATION/TRAINING PROGRAM

## 2023-06-05 PROCEDURE — 87086 URINE CULTURE/COLONY COUNT: CPT | Performed by: STUDENT IN AN ORGANIZED HEALTH CARE EDUCATION/TRAINING PROGRAM

## 2023-06-05 PROCEDURE — 81025 URINE PREGNANCY TEST: CPT | Mod: S$GLB,,, | Performed by: STUDENT IN AN ORGANIZED HEALTH CARE EDUCATION/TRAINING PROGRAM

## 2023-06-05 PROCEDURE — 3079F DIAST BP 80-89 MM HG: CPT | Mod: CPTII,S$GLB,, | Performed by: STUDENT IN AN ORGANIZED HEALTH CARE EDUCATION/TRAINING PROGRAM

## 2023-06-05 PROCEDURE — 99999 PR PBB SHADOW E&M-EST. PATIENT-LVL III: CPT | Mod: PBBFAC,,, | Performed by: STUDENT IN AN ORGANIZED HEALTH CARE EDUCATION/TRAINING PROGRAM

## 2023-06-05 PROCEDURE — 87591 N.GONORRHOEAE DNA AMP PROB: CPT | Performed by: STUDENT IN AN ORGANIZED HEALTH CARE EDUCATION/TRAINING PROGRAM

## 2023-06-05 PROCEDURE — 99214 OFFICE O/P EST MOD 30 MIN: CPT | Mod: S$GLB,,, | Performed by: STUDENT IN AN ORGANIZED HEALTH CARE EDUCATION/TRAINING PROGRAM

## 2023-06-05 PROCEDURE — 81025 POCT URINE PREGNANCY: ICD-10-PCS | Mod: S$GLB,,, | Performed by: STUDENT IN AN ORGANIZED HEALTH CARE EDUCATION/TRAINING PROGRAM

## 2023-06-05 PROCEDURE — 3079F PR MOST RECENT DIASTOLIC BLOOD PRESSURE 80-89 MM HG: ICD-10-PCS | Mod: CPTII,S$GLB,, | Performed by: STUDENT IN AN ORGANIZED HEALTH CARE EDUCATION/TRAINING PROGRAM

## 2023-06-05 PROCEDURE — 3074F PR MOST RECENT SYSTOLIC BLOOD PRESSURE < 130 MM HG: ICD-10-PCS | Mod: CPTII,S$GLB,, | Performed by: STUDENT IN AN ORGANIZED HEALTH CARE EDUCATION/TRAINING PROGRAM

## 2023-06-05 PROCEDURE — 87077 CULTURE AEROBIC IDENTIFY: CPT | Performed by: STUDENT IN AN ORGANIZED HEALTH CARE EDUCATION/TRAINING PROGRAM

## 2023-06-05 PROCEDURE — 87186 SC STD MICRODIL/AGAR DIL: CPT | Performed by: STUDENT IN AN ORGANIZED HEALTH CARE EDUCATION/TRAINING PROGRAM

## 2023-06-05 RX ORDER — DULOXETIN HYDROCHLORIDE 60 MG/1
120 CAPSULE, DELAYED RELEASE ORAL
COMMUNITY
Start: 2023-05-28

## 2023-06-05 NOTE — PROGRESS NOTES
Chief Complaint:  Absence of Menses     HPI:      Gypsy Rivera is a 32 y.o.  who presents with concern of absence of menses.  Denies VB, n/v.    GYN Hx:  LMP 23.  Cycles occurring every 28-30 days and menses lasting 4 days, light bleeding. On birth control since around age 15 until about last year.     Flu shot: no  ZIKA travel or exposure: no    Past Medical History:   Diagnosis Date    ADD (attention deficit disorder)     Anxiety     Kidney stones      Past Surgical History:   Procedure Laterality Date    BACK SURGERY  2022    COSMETIC SURGERY      rhinoplasty     Social History     Tobacco Use    Smoking status: Never    Smokeless tobacco: Never   Substance Use Topics    Alcohol use: Yes     Comment: occasional    Drug use: No     Family History   Problem Relation Age of Onset    Heart disease Mother     Cancer Maternal Grandfather         Prostate     Eczema Sister     Melanoma Neg Hx     Psoriasis Neg Hx     Lupus Neg Hx      OB History    Para Term  AB Living   1             SAB IAB Ectopic Multiple Live Births                  # Outcome Date GA Lbr Richard/2nd Weight Sex Delivery Anes PTL Lv   1 Current                Current Outpatient Medications:     cetirizine (ZYRTEC) 10 MG tablet, Take 10 mg by mouth once daily., Disp: , Rfl:     clonazePAM (KLONOPIN) 0.5 MG tablet, Take 0.5 mg by mouth 2 (two) times daily as needed for Anxiety., Disp: , Rfl:     DULoxetine (CYMBALTA) 60 MG capsule, Take 120 mg by mouth., Disp: , Rfl:     propranolol (INDERAL) 10 MG tablet, Take 10 mg by mouth daily as needed., Disp: , Rfl:     traZODone (DESYREL) 50 MG tablet, Take 50 mg by mouth every evening., Disp: , Rfl:     VYVANSE 70 mg capsule, Take 70 mg by mouth every morning., Disp: , Rfl:     diclofenac sodium (VOLTAREN) 1 % Gel, Apply 2 g topically daily as needed. Apply to affected area PRN (Patient not taking: Reported on 2023), Disp: 1 Tube, Rfl: 2  ROS:     GENERAL:  Feeling well  "overall.   SKIN:  Denies rash.   BREASTS:  Denies lumps or nipple discharge. + tenderness.  ABDOMEN:  Denies abdominal pain.  URINARY:  Denies dysuria, hematuria.   NEUROLOGIC:  Denies syncope or weakness.     Physical Exam:      PHYSICAL EXAM:  /88   Ht 5' 4" (1.626 m)   Wt 56.4 kg (124 lb 7.2 oz)   LMP 2023   BMI 21.36 kg/m²   Body mass index is 21.36 kg/m².     APPEARANCE: Well nourished, well developed, in no acute distress.   PSYCH: Appropriate mood and affect. Makes eye contact.   CARDIOVASCULAR:  Well-perfused throughout.   RESPIRATORY:  Speaking comfortably in full sentences. No accessory muscle use to breathe.   EXTREMITIES: No edema.   ABD: Soft, nonacute.  SKIN:  Warm and dry, non-diaphoretic.    Assessment/Plan:       ICD-10-CM ICD-9-CM    1. Amenorrhea  N91.2 626.0 POCT urine pregnancy      2. Pregnancy, unspecified gestational age  Z34.90 V22.2 Urine culture      C. trachomatis/N. gonorrhoeae by AMP DNA [facility]; Urine          First Trimester Counselin. Patient was counseled today on proper weight gain based on the [place] [employer] [facility]'s recommendations based on her pre-pregnancy weight. Recommend 28-40 lbs for underweight (BMI <18.5) women, 25-35 lbs for normal weight (BMI 18.5-24.9) women, 15-25 lbs for overweight (BMI 25-29.9) women, 11-20 lbs for obese (BMI 30 or greater) women.   2. Discussed foods to avoid in pregnancy. Recommend limit caffeine use. Avoid cat litter.  3. Discussed prenatal vitamin options and folic acid (i.e. stool softener, DHA). Maintain good hydration and avoid tobacco/alcohol/drugs.   Has PNV with her - PNV with 800mcg folic acid.    4. Optional genetic testing discussed - patient will let us know if she is interested.   5. Optional carrier screening discussed - patient will let us know if she is interested.  Literature, phone number provided for quote.   Genetic syndromes in family:  no.     6. Safety of exercise discussed with patient, and continued " active lifestyle encouraged.  Avoid heavy lifting, activities with abdominal trauma or fall risk.  Avoid sauna, hot tubs.  7. Coronavirus and Zika virus precautions for both patient and her spouse.  8. Normal course of prenatal care reviewed.  9. Medications safe in pregnancy discussed and list provided. Pregnancy packet provided with dietary recommendations, safe medications list.  Medication list reviewed:      Trazodone in Pregnancy  - Taking to help stay asleep at night  - Uses nightly  - To discuss with prescribing psychiatrist about possibility of trial on decreased dose, has been on for several years     Cymbalta in Pregnancy  - Reviewed SSRIs do cross placenta and possibility of withdrawal in baby:  irritability, crying, jitteriness, increased muscle tone, trouble breathing, altered sleep patterns, tremors, and/or trouble eating.  - To help with nerve pain from back surgery and helping with depression  - Risk/benefit profile discussed  - PP depression risk    Vyvanese in Pregnancy  - Vyvanese is not well studied in pregnancy  - Breastfeeding not recommended by FDA  - Reviewed  withdrawal:  feeding difficulties, irritability, agitation, drowsiness and that Amphetamines might increase risk of PTL, low birthweight  - Risk/benefit profile discussed    MJ  - Associated with lower birthweight, changes in brain development  - Has Medical card and has decreased amount of use    Propranolol in Pregnancy  - Uses for palpitations from vyanese  - BID taking    Benzodiazepines in Pregnancy  - Reviewed possible associated with increased risk of cleft lip/palate, malformations and Withdrawal at delivery  - Using for anxiety, daily use  - Reviewed risks/benefits, to discuss possible alternatives with Psychiatrist     To discontinue Ashwagandha supplements.    10. Advised patient to eat small, frequent meals; if at any point she is unable to keep food/liquid down for a sustainable period patient should present for  evaluation.    11. Schedule Dating US; afterwards to obtain PNLs  Needs baseline pelvic exam at upcoming visit  LPS 4/2022 (CareEverywhere):  NILM, HPV(-)    12. History of L4 laminectomy for lumbar stenosis/radiculopathy (9/2022)  Plan on OB Anesthesia consult 3TMS        30 minutes of face-to-face discussion occurred during today's visit.     Use of the [employer] Patient Portal discussed and encouraged during today's visit.       Kasey Oscar MD  6/5/2023

## 2023-06-06 LAB
C TRACH DNA SPEC QL NAA+PROBE: NOT DETECTED
N GONORRHOEA DNA SPEC QL NAA+PROBE: NOT DETECTED

## 2023-06-07 LAB — BACTERIA UR CULT: ABNORMAL

## 2023-06-08 ENCOUNTER — PATIENT MESSAGE (OUTPATIENT)
Dept: OBSTETRICS AND GYNECOLOGY | Facility: CLINIC | Age: 33
End: 2023-06-08
Payer: COMMERCIAL

## 2023-06-08 DIAGNOSIS — O99.891 ASYMPTOMATIC BACTERIURIA DURING PREGNANCY: Primary | ICD-10-CM

## 2023-06-08 DIAGNOSIS — R82.71 ASYMPTOMATIC BACTERIURIA DURING PREGNANCY: Primary | ICD-10-CM

## 2023-06-08 RX ORDER — CEPHALEXIN 500 MG/1
500 CAPSULE ORAL EVERY 12 HOURS
Qty: 14 CAPSULE | Refills: 0 | Status: SHIPPED | OUTPATIENT
Start: 2023-06-08 | End: 2023-06-15

## 2023-06-20 ENCOUNTER — PATIENT MESSAGE (OUTPATIENT)
Dept: OBSTETRICS AND GYNECOLOGY | Facility: CLINIC | Age: 33
End: 2023-06-20
Payer: COMMERCIAL

## 2023-06-20 DIAGNOSIS — O21.9 NAUSEA AND VOMITING IN PREGNANCY: Primary | ICD-10-CM

## 2023-06-20 RX ORDER — METOCLOPRAMIDE 5 MG/1
5 TABLET ORAL 2 TIMES DAILY PRN
Qty: 35 TABLET | Refills: 1 | Status: SHIPPED | OUTPATIENT
Start: 2023-06-20 | End: 2023-06-21 | Stop reason: SDUPTHER

## 2023-06-21 ENCOUNTER — PATIENT MESSAGE (OUTPATIENT)
Dept: OBSTETRICS AND GYNECOLOGY | Facility: CLINIC | Age: 33
End: 2023-06-21
Payer: COMMERCIAL

## 2023-06-21 DIAGNOSIS — O21.9 NAUSEA AND VOMITING IN PREGNANCY: ICD-10-CM

## 2023-06-21 RX ORDER — METOCLOPRAMIDE 5 MG/1
5 TABLET ORAL 2 TIMES DAILY PRN
Qty: 35 TABLET | Refills: 1 | Status: SHIPPED | OUTPATIENT
Start: 2023-06-21 | End: 2023-08-07 | Stop reason: SDUPTHER

## 2023-06-26 ENCOUNTER — PATIENT MESSAGE (OUTPATIENT)
Dept: MATERNAL FETAL MEDICINE | Facility: CLINIC | Age: 33
End: 2023-06-26
Payer: COMMERCIAL

## 2023-06-26 ENCOUNTER — INITIAL PRENATAL (OUTPATIENT)
Dept: OBSTETRICS AND GYNECOLOGY | Facility: CLINIC | Age: 33
End: 2023-06-26
Payer: COMMERCIAL

## 2023-06-26 ENCOUNTER — PROCEDURE VISIT (OUTPATIENT)
Dept: OBSTETRICS AND GYNECOLOGY | Facility: CLINIC | Age: 33
End: 2023-06-26
Payer: COMMERCIAL

## 2023-06-26 VITALS — SYSTOLIC BLOOD PRESSURE: 118 MMHG | DIASTOLIC BLOOD PRESSURE: 82 MMHG

## 2023-06-26 DIAGNOSIS — R82.71 BACTERIURIA: ICD-10-CM

## 2023-06-26 DIAGNOSIS — Z34.90 EARLY STAGE OF PREGNANCY: Primary | ICD-10-CM

## 2023-06-26 DIAGNOSIS — Z34.90 EARLY STAGE OF PREGNANCY: ICD-10-CM

## 2023-06-26 PROCEDURE — 99213 OFFICE O/P EST LOW 20 MIN: CPT | Mod: PBBFAC,TH | Performed by: STUDENT IN AN ORGANIZED HEALTH CARE EDUCATION/TRAINING PROGRAM

## 2023-06-26 PROCEDURE — 0502F SUBSEQUENT PRENATAL CARE: CPT | Mod: CPTII,S$GLB,, | Performed by: STUDENT IN AN ORGANIZED HEALTH CARE EDUCATION/TRAINING PROGRAM

## 2023-06-26 PROCEDURE — 76801 OB US < 14 WKS SINGLE FETUS: CPT | Mod: S$GLB,,, | Performed by: OBSTETRICS & GYNECOLOGY

## 2023-06-26 PROCEDURE — 99999 PR PBB SHADOW E&M-EST. PATIENT-LVL III: CPT | Mod: PBBFAC,,, | Performed by: STUDENT IN AN ORGANIZED HEALTH CARE EDUCATION/TRAINING PROGRAM

## 2023-06-26 PROCEDURE — 76801 US OB/GYN EXTENDED PROCEDURE (VIEWPOINT): ICD-10-PCS | Mod: S$GLB,,, | Performed by: OBSTETRICS & GYNECOLOGY

## 2023-06-26 PROCEDURE — 99999 PR PBB SHADOW E&M-EST. PATIENT-LVL III: ICD-10-PCS | Mod: PBBFAC,,, | Performed by: STUDENT IN AN ORGANIZED HEALTH CARE EDUCATION/TRAINING PROGRAM

## 2023-06-26 PROCEDURE — 87086 URINE CULTURE/COLONY COUNT: CPT | Performed by: STUDENT IN AN ORGANIZED HEALTH CARE EDUCATION/TRAINING PROGRAM

## 2023-06-26 PROCEDURE — 0502F PR SUBSEQUENT PRENATAL CARE: ICD-10-PCS | Mod: CPTII,S$GLB,, | Performed by: STUDENT IN AN ORGANIZED HEALTH CARE EDUCATION/TRAINING PROGRAM

## 2023-06-26 NOTE — PROGRESS NOTES
at 8w4d   Doing well overall, -LOF/VB/CTX. Dating US today. Presents with SO, Renaldo.   Continues to have nausea, emesis - somewhat better with reglan. To increase with vitamin B6. To let me know if insufficient improvement.  Current medications:  cymbalta. Has discontinued all others. Reviewed risks/benefits of vyvanse in pregnancy. Discussed MFM consult for medication review, agreeable - sent.   Covid vaccinated: yes.  Anatomy US ordered. Resend urine culture today. RTC 4wks or sooner PRN. No additional acute complaints. ER precautions.

## 2023-06-27 LAB — BACTERIA UR CULT: NORMAL

## 2023-07-03 ENCOUNTER — PATIENT MESSAGE (OUTPATIENT)
Dept: MATERNAL FETAL MEDICINE | Facility: CLINIC | Age: 33
End: 2023-07-03
Payer: COMMERCIAL

## 2023-07-11 ENCOUNTER — OFFICE VISIT (OUTPATIENT)
Dept: MATERNAL FETAL MEDICINE | Facility: CLINIC | Age: 33
End: 2023-07-11
Payer: COMMERCIAL

## 2023-07-11 DIAGNOSIS — R00.0 TACHYCARDIA: ICD-10-CM

## 2023-07-11 DIAGNOSIS — Z34.90 EARLY STAGE OF PREGNANCY: ICD-10-CM

## 2023-07-11 DIAGNOSIS — Z36.89 ENCOUNTER FOR FETAL ANATOMIC SURVEY: Primary | ICD-10-CM

## 2023-07-11 DIAGNOSIS — O09.891 MEDICATION EXPOSURE DURING FIRST TRIMESTER OF PREGNANCY: ICD-10-CM

## 2023-07-11 PROCEDURE — 99215 PR OFFICE/OUTPT VISIT, EST, LEVL V, 40-54 MIN: ICD-10-PCS | Mod: 95,,, | Performed by: OBSTETRICS & GYNECOLOGY

## 2023-07-11 PROCEDURE — 99215 OFFICE O/P EST HI 40 MIN: CPT | Mod: 95,,, | Performed by: OBSTETRICS & GYNECOLOGY

## 2023-07-12 DIAGNOSIS — R00.0 TACHYCARDIA: Primary | ICD-10-CM

## 2023-07-12 NOTE — ASSESSMENT & PLAN NOTE
Patient thinks is medication related however has not been fully evaluated.    Recommendations:   Please order maternal EKG, echocardiogram, Holter monitor   Consider Cardiology referral as indicated   Can resume propranolol

## 2023-07-12 NOTE — PROGRESS NOTES
The patient location is: home  The chief complaint leading to consultation is: medication exposure in pregnancy    Visit type: audiovisual    Face to Face time with patient: 20-25 minutes  40 minutes of total time spent on the encounter, which includes face to face time and non-face to face time preparing to see the patient (eg, review of tests), Obtaining and/or reviewing separately obtained history, Documenting clinical information in the electronic or other health record, Independently interpreting results (not separately reported) and communicating results to the patient/family/caregiver, or Care coordination (not separately reported).         Each patient to whom he or she provides medical services by telemedicine is:  (1) informed of the relationship between the physician and patient and the respective role of any other health care provider with respect to management of the patient; and (2) notified that he or she may decline to receive medical services by telemedicine and may withdraw from such care at any time.    Notes:       MATERNAL-FETAL MEDICINE   CONSULT NOTE    Provider requesting consultation: Dr. Oscar    SUBJECTIVE:     Ms. Gypsy Rivera is a 32 y.o.  female with IUP at 10w5d who is seen in consultation by M for evaluation and management of:  Problem   Medication Exposure During First Trimester of Pregnancy   Tachycardia            Medication List with Changes/Refills   Current Medications    CETIRIZINE (ZYRTEC) 10 MG TABLET    Take 10 mg by mouth once daily.    CLONAZEPAM (KLONOPIN) 0.5 MG TABLET    Take 0.5 mg by mouth 2 (two) times daily as needed for Anxiety.    DICLOFENAC SODIUM (VOLTAREN) 1 % GEL    Apply 2 g topically daily as needed. Apply to affected area PRN    DIPHENHYDRAMINE HCL (UNISOM, DIPHENHYDRAMINE, ORAL)    Take by mouth once daily. Once nightly.    DULOXETINE (CYMBALTA) 60 MG CAPSULE    Take 120 mg by mouth.    METOCLOPRAMIDE HCL (REGLAN) 5 MG TABLET    Take 1 tablet  (5 mg total) by mouth 2 (two) times daily as needed (nausea, vomiting).    PROPRANOLOL (INDERAL) 10 MG TABLET    Take 10 mg by mouth daily as needed.    PYRIDOXINE HCL, VITAMIN B6, (VITAMIN B-6 ORAL)    Take by mouth once daily. Once daily in the morning.    TRAZODONE (DESYREL) 50 MG TABLET    Take 50 mg by mouth every evening.    VYVANSE 70 MG CAPSULE    Take 70 mg by mouth every morning.       Review of patient's allergies indicates:  No Known Allergies    PMH:  Past Medical History:   Diagnosis Date    ADD (attention deficit disorder)     Anxiety     Kidney stones        PObHx:  OB History    Para Term  AB Living   1             SAB IAB Ectopic Multiple Live Births                  # Outcome Date GA Lbr Richard/2nd Weight Sex Delivery Anes PTL Lv   1 Current                PSH:  Past Surgical History:   Procedure Laterality Date    BACK SURGERY  2022    COSMETIC SURGERY      rhinoplasty       Family history:family history includes Cancer in her maternal grandfather; Eczema in her sister; Heart disease in her mother.    Social history: reports that she has never smoked. She has never used smokeless tobacco. She reports current alcohol use. She reports that she does not use drugs.    Genetic history: The patient denies any inherited genetic diseases or birth defects in herself or her partner's personal history or family.    Objective:   LMP 2023     Deferred-virtual visit Physical Exam    ASSESSMENT/PLAN:     32 y.o.  female with IUP at 10w5d     Medication exposure during first trimester of pregnancy  History reviewed with patient. History of anxiety/depression as well as ADHD. High-function/high stress job (). About 5 years ago started on medications. At beginning of pregnancy was on klonopin, vyvanse, cymbalta, propranolol, and trazodone. Has since d/c'd everything but the cymbalta (now taking a lower dose). Is noticing that it is very difficult to work/focus without the vyvanse.  Is doing ok off the klonopin. Sleeping ok off the trazodone. Feels like the tachycardia was better with propranolol.  We reviewed all of her medications in detail and discussed the risks of untreated psychiatric disorders in pregnancy. Often, some medications need to be continue for maternal well-being. We reviewed the  risks of SSRI including the risk for a transient withdrawal syndrome. She has significant issues working while off the vyvanse, we discussed reaching out to her psychiatrist to see if a lower dose would be effective.    Recommendations:  Continue Cymbalta  Can resume propranolol if desires  Doing well off the trazodone, continue to hold this for now  Doing well off klonopin; can resume episodic use if needed for panic attacks (patient denies history of panic attacks), so anticipate can remain of this  Consider resuming vyvanse at a lower dose  MFM will perform targeted US at 19 weeks for anatomic survey, will follow fetal growth in 3rd trimester  Consult NICU to attend delivery given multiple medication exposures at birth      Tachycardia  Patient thinks is medication related however has not been fully evaluated.    Recommendations:  Please order maternal EKG, echocardiogram, Holter monitor  Consider Cardiology referral as indicated  Can resume propranolol        FOLLOW UP:   F/u in 9 weeks for US/MFM visit    40 minutes of total time spent on the encounter, which includes face to face time and non-face to face time preparing to see the patient (eg, review of tests), obtaining and/or reviewing separately obtained history, documenting clinical information in the electronic or other health record, independently interpreting results (not separately reported) and communicating results to the patient/family/caregiver, or care coordination (not separately reported).      Emma Manriquez  Maternal-Fetal Medicine    Electronically Signed by Emma Manriquez 2023

## 2023-07-12 NOTE — ASSESSMENT & PLAN NOTE
History reviewed with patient. History of anxiety/depression as well as ADHD. High-function/high stress job (). About 5 years ago started on medications. At beginning of pregnancy was on klonopin, vyvanse, cymbalta, propranolol, and trazodone. Has since d/c'd everything but the cymbalta (now taking a lower dose). Is noticing that it is very difficult to work/focus without the vyvanse. Is doing ok off the klonopin. Sleeping ok off the trazodone. Feels like the tachycardia was better with propranolol.  We reviewed all of her medications in detail and discussed the risks of untreated psychiatric disorders in pregnancy. Often, some medications need to be continue for maternal well-being. We reviewed the  risks of SSRI including the risk for a transient withdrawal syndrome. She has significant issues working while off the vyvanse, we discussed reaching out to her psychiatrist to see if a lower dose would be effective.    Recommendations:   Continue Cymbalta   Can resume propranolol if desires   Doing well off the trazodone, continue to hold this for now   Doing well off klonopin; can resume episodic use if needed for panic attacks (patient denies history of panic attacks), so anticipate can remain of this   Consider resuming vyvanse at a lower dose   MFM will perform targeted US at 19 weeks for anatomic survey, will follow fetal growth in 3rd trimester   Consult NICU to attend delivery given multiple medication exposures at birth

## 2023-07-13 ENCOUNTER — PATIENT MESSAGE (OUTPATIENT)
Dept: OBSTETRICS AND GYNECOLOGY | Facility: CLINIC | Age: 33
End: 2023-07-13
Payer: COMMERCIAL

## 2023-07-13 ENCOUNTER — TELEPHONE (OUTPATIENT)
Dept: OBSTETRICS AND GYNECOLOGY | Facility: CLINIC | Age: 33
End: 2023-07-13
Payer: COMMERCIAL

## 2023-07-13 NOTE — TELEPHONE ENCOUNTER
----- Message from Kasey Oscar MD sent at 7/12/2023  8:09 AM CDT -----  Please help set up patient's appointments for EKG, ECHO, and Holter monitor. Orders are in. Per MFM recommendation. Thank you!

## 2023-07-13 NOTE — TELEPHONE ENCOUNTER
Called [facility] scheduling dept to help further assist with 48 hr Glover mon, ekg and echo    Scheduled ekg and glover mon tests at main campus on 07-19-23 for 1:45 pm and 2 pm    Scheduled echo for next available on 07-28-23 at [facility] for 11 am    -called pt to inform of tests scheduled  No answer  Left detailed voice message as well as call back number      Sent portal message      ND

## 2023-07-19 ENCOUNTER — CLINICAL SUPPORT (OUTPATIENT)
Dept: CARDIOLOGY | Facility: HOSPITAL | Age: 33
End: 2023-07-19
Attending: STUDENT IN AN ORGANIZED HEALTH CARE EDUCATION/TRAINING PROGRAM
Payer: COMMERCIAL

## 2023-07-19 ENCOUNTER — HOSPITAL ENCOUNTER (OUTPATIENT)
Dept: CARDIOLOGY | Facility: CLINIC | Age: 33
Discharge: HOME | End: 2023-07-19
Payer: COMMERCIAL

## 2023-07-19 DIAGNOSIS — R00.0 TACHYCARDIA: ICD-10-CM

## 2023-07-19 PROCEDURE — 93226 XTRNL ECG REC<48 HR SCAN A/R: CPT

## 2023-07-19 PROCEDURE — 93005 ELECTROCARDIOGRAM TRACING: CPT | Mod: S$GLB,,, | Performed by: STUDENT IN AN ORGANIZED HEALTH CARE EDUCATION/TRAINING PROGRAM

## 2023-07-19 PROCEDURE — 93227 HOLTER MONITOR - 48 HOUR (CUPID ONLY): ICD-10-PCS | Mod: ,,, | Performed by: INTERNAL MEDICINE

## 2023-07-19 PROCEDURE — 93227 XTRNL ECG REC<48 HR R&I: CPT | Mod: ,,, | Performed by: INTERNAL MEDICINE

## 2023-07-19 PROCEDURE — 93005 EKG 12-LEAD: ICD-10-PCS | Mod: S$GLB,,, | Performed by: STUDENT IN AN ORGANIZED HEALTH CARE EDUCATION/TRAINING PROGRAM

## 2023-07-19 PROCEDURE — 93010 EKG 12-LEAD: ICD-10-PCS | Mod: S$GLB,,, | Performed by: INTERNAL MEDICINE

## 2023-07-19 PROCEDURE — 93010 ELECTROCARDIOGRAM REPORT: CPT | Mod: S$GLB,,, | Performed by: INTERNAL MEDICINE

## 2023-07-24 LAB
OHS CV EVENT MONITOR DAY: 0
OHS CV HOLTER LENGTH DECIMAL HOURS: 48
OHS CV HOLTER LENGTH HOURS: 48
OHS CV HOLTER LENGTH MINUTES: 0
OHS CV HOLTER SINUS AVERAGE HR: 110
OHS CV HOLTER SINUS MAX HR: 197
OHS CV HOLTER SINUS MIN HR: 71

## 2023-08-04 ENCOUNTER — TELEPHONE (OUTPATIENT)
Dept: OBSTETRICS AND GYNECOLOGY | Facility: CLINIC | Age: 33
End: 2023-08-04
Payer: COMMERCIAL

## 2023-08-04 NOTE — TELEPHONE ENCOUNTER
----- Message from Lashell Harvey sent at 8/3/2023  1:19 PM CDT -----  Regarding: Patient call back  Who called:  Jeanne from [facility]      What is the request in detail: Patient is requesting a call back. She states she would like to know the pt due date. She states she has a secure vm and to just leave the due sate on her vm if she does not answer  Please advise.    Can the clinic reply by [facility]? No    Best call back number: [phone]    Additional Information: N/A

## 2023-08-07 ENCOUNTER — LAB VISIT (OUTPATIENT)
Dept: LAB | Facility: OTHER | Age: 33
End: 2023-08-07
Attending: STUDENT IN AN ORGANIZED HEALTH CARE EDUCATION/TRAINING PROGRAM
Payer: COMMERCIAL

## 2023-08-07 ENCOUNTER — ROUTINE PRENATAL (OUTPATIENT)
Dept: OBSTETRICS AND GYNECOLOGY | Facility: CLINIC | Age: 33
End: 2023-08-07
Payer: COMMERCIAL

## 2023-08-07 VITALS
SYSTOLIC BLOOD PRESSURE: 110 MMHG | DIASTOLIC BLOOD PRESSURE: 68 MMHG | BODY MASS INDEX: 21.87 KG/M2 | WEIGHT: 127.44 LBS

## 2023-08-07 DIAGNOSIS — Z3A.14 14 WEEKS GESTATION OF PREGNANCY: Primary | ICD-10-CM

## 2023-08-07 DIAGNOSIS — Z3A.14 14 WEEKS GESTATION OF PREGNANCY: ICD-10-CM

## 2023-08-07 DIAGNOSIS — O21.9 NAUSEA AND VOMITING IN PREGNANCY: ICD-10-CM

## 2023-08-07 LAB
ABO + RH BLD: NORMAL
ALBUMIN SERPL BCP-MCNC: 3.4 G/DL (ref 3.5–5.2)
ALP SERPL-CCNC: 101 U/L (ref 55–135)
ALT SERPL W/O P-5'-P-CCNC: 20 U/L (ref 10–44)
ANION GAP SERPL CALC-SCNC: 11 MMOL/L (ref 8–16)
AST SERPL-CCNC: 20 U/L (ref 10–40)
BASOPHILS # BLD AUTO: 0.03 K/UL (ref 0–0.2)
BASOPHILS NFR BLD: 0.3 % (ref 0–1.9)
BILIRUB SERPL-MCNC: 0.5 MG/DL (ref 0.1–1)
BLD GP AB SCN CELLS X3 SERPL QL: NORMAL
BUN SERPL-MCNC: 6 MG/DL (ref 6–20)
CALCIUM SERPL-MCNC: 10 MG/DL (ref 8.7–10.5)
CHLORIDE SERPL-SCNC: 104 MMOL/L (ref 95–110)
CO2 SERPL-SCNC: 22 MMOL/L (ref 23–29)
CREAT SERPL-MCNC: 0.7 MG/DL (ref 0.5–1.4)
DIFFERENTIAL METHOD: ABNORMAL
EOSINOPHIL # BLD AUTO: 0.1 K/UL (ref 0–0.5)
EOSINOPHIL NFR BLD: 0.6 % (ref 0–8)
ERYTHROCYTE [DISTWIDTH] IN BLOOD BY AUTOMATED COUNT: 13.2 % (ref 11.5–14.5)
EST. GFR  (NO RACE VARIABLE): >60 ML/MIN/1.73 M^2
GLUCOSE SERPL-MCNC: 78 MG/DL (ref 70–110)
HBV SURFACE AG SERPL QL IA: NORMAL
HCT VFR BLD AUTO: 39.4 % (ref 37–48.5)
HCV AB SERPL QL IA: NORMAL
HGB BLD-MCNC: 12.9 G/DL (ref 12–16)
HIV 1+2 AB+HIV1 P24 AG SERPL QL IA: NORMAL
IMM GRANULOCYTES # BLD AUTO: 0.08 K/UL (ref 0–0.04)
IMM GRANULOCYTES NFR BLD AUTO: 0.7 % (ref 0–0.5)
LYMPHOCYTES # BLD AUTO: 2.2 K/UL (ref 1–4.8)
LYMPHOCYTES NFR BLD: 20 % (ref 18–48)
MCH RBC QN AUTO: 29.6 PG (ref 27–31)
MCHC RBC AUTO-ENTMCNC: 32.7 G/DL (ref 32–36)
MCV RBC AUTO: 90 FL (ref 82–98)
MONOCYTES # BLD AUTO: 0.5 K/UL (ref 0.3–1)
MONOCYTES NFR BLD: 4.4 % (ref 4–15)
NEUTROPHILS # BLD AUTO: 8 K/UL (ref 1.8–7.7)
NEUTROPHILS NFR BLD: 74 % (ref 38–73)
NRBC BLD-RTO: 0 /100 WBC
PLATELET # BLD AUTO: 274 K/UL (ref 150–450)
PMV BLD AUTO: 11.2 FL (ref 9.2–12.9)
POTASSIUM SERPL-SCNC: 4 MMOL/L (ref 3.5–5.1)
PROT SERPL-MCNC: 7.5 G/DL (ref 6–8.4)
RBC # BLD AUTO: 4.36 M/UL (ref 4–5.4)
RPR SER QL: NORMAL
SODIUM SERPL-SCNC: 137 MMOL/L (ref 136–145)
SPECIMEN OUTDATE: NORMAL
WBC # BLD AUTO: 10.77 K/UL (ref 3.9–12.7)

## 2023-08-07 PROCEDURE — 99999 PR PBB SHADOW E&M-EST. PATIENT-LVL III: ICD-10-PCS | Mod: PBBFAC,,, | Performed by: STUDENT IN AN ORGANIZED HEALTH CARE EDUCATION/TRAINING PROGRAM

## 2023-08-07 PROCEDURE — 86762 RUBELLA ANTIBODY: CPT | Performed by: STUDENT IN AN ORGANIZED HEALTH CARE EDUCATION/TRAINING PROGRAM

## 2023-08-07 PROCEDURE — 80053 COMPREHEN METABOLIC PANEL: CPT | Performed by: STUDENT IN AN ORGANIZED HEALTH CARE EDUCATION/TRAINING PROGRAM

## 2023-08-07 PROCEDURE — 86900 BLOOD TYPING SEROLOGIC ABO: CPT | Performed by: STUDENT IN AN ORGANIZED HEALTH CARE EDUCATION/TRAINING PROGRAM

## 2023-08-07 PROCEDURE — 85025 COMPLETE CBC W/AUTO DIFF WBC: CPT | Performed by: STUDENT IN AN ORGANIZED HEALTH CARE EDUCATION/TRAINING PROGRAM

## 2023-08-07 PROCEDURE — 86592 SYPHILIS TEST NON-TREP QUAL: CPT | Performed by: STUDENT IN AN ORGANIZED HEALTH CARE EDUCATION/TRAINING PROGRAM

## 2023-08-07 PROCEDURE — 99999 PR PBB SHADOW E&M-EST. PATIENT-LVL III: CPT | Mod: PBBFAC,,, | Performed by: STUDENT IN AN ORGANIZED HEALTH CARE EDUCATION/TRAINING PROGRAM

## 2023-08-07 PROCEDURE — 87389 HIV-1 AG W/HIV-1&-2 AB AG IA: CPT | Performed by: STUDENT IN AN ORGANIZED HEALTH CARE EDUCATION/TRAINING PROGRAM

## 2023-08-07 PROCEDURE — 87340 HEPATITIS B SURFACE AG IA: CPT | Performed by: STUDENT IN AN ORGANIZED HEALTH CARE EDUCATION/TRAINING PROGRAM

## 2023-08-07 PROCEDURE — 86787 VARICELLA-ZOSTER ANTIBODY: CPT | Performed by: STUDENT IN AN ORGANIZED HEALTH CARE EDUCATION/TRAINING PROGRAM

## 2023-08-07 PROCEDURE — 0502F PR SUBSEQUENT PRENATAL CARE: ICD-10-PCS | Mod: CPTII,S$GLB,, | Performed by: STUDENT IN AN ORGANIZED HEALTH CARE EDUCATION/TRAINING PROGRAM

## 2023-08-07 PROCEDURE — 36415 COLL VENOUS BLD VENIPUNCTURE: CPT | Performed by: STUDENT IN AN ORGANIZED HEALTH CARE EDUCATION/TRAINING PROGRAM

## 2023-08-07 PROCEDURE — 86803 HEPATITIS C AB TEST: CPT | Performed by: STUDENT IN AN ORGANIZED HEALTH CARE EDUCATION/TRAINING PROGRAM

## 2023-08-07 PROCEDURE — 0502F SUBSEQUENT PRENATAL CARE: CPT | Mod: CPTII,S$GLB,, | Performed by: STUDENT IN AN ORGANIZED HEALTH CARE EDUCATION/TRAINING PROGRAM

## 2023-08-07 RX ORDER — METOCLOPRAMIDE 5 MG/1
5 TABLET ORAL 2 TIMES DAILY PRN
Qty: 35 TABLET | Refills: 1 | Status: ON HOLD | OUTPATIENT
Start: 2023-08-07 | End: 2023-12-01 | Stop reason: HOSPADM

## 2023-08-07 NOTE — PROGRESS NOTES
at 14w4d   Doing well overall, -LOF/VB/CTX.   PNV compliant. PNLs today + Genetic testing. Vscan with +FM, +CM.   Scheduled for EKG - sinus tachycardia, Holter - sinus tachycardia with no sustained arrhythmias, and ECHO - EF 70%. Cardiology consult placed. Pulse today 98bpm.   s/p MFM consult for regarding medications in pregnancy. Anatomy US previously ordered.   ER precautions. RTC 4wks.

## 2023-08-08 LAB
RUBV IGG SER-ACNC: <5 IU/ML
RUBV IGG SER-IMP: ABNORMAL
VARICELLA INTERPRETATION: NEGATIVE
VARICELLA ZOSTER IGG: 63.15 AU/ML

## 2023-08-10 ENCOUNTER — PATIENT MESSAGE (OUTPATIENT)
Dept: ADMINISTRATIVE | Facility: OTHER | Age: 33
End: 2023-08-10
Payer: COMMERCIAL

## 2023-08-16 ENCOUNTER — TELEPHONE (OUTPATIENT)
Dept: OBSTETRICS AND GYNECOLOGY | Facility: CLINIC | Age: 33
End: 2023-08-16
Payer: COMMERCIAL

## 2023-08-16 NOTE — TELEPHONE ENCOUNTER
Called pt in regards to [id number] results    Pt expressed understanding and gratitude  Informed of gender and results  No further questions      ND

## 2023-08-17 ENCOUNTER — PATIENT MESSAGE (OUTPATIENT)
Dept: OTHER | Facility: OTHER | Age: 33
End: 2023-08-17
Payer: COMMERCIAL

## 2023-08-21 ENCOUNTER — ROUTINE PRENATAL (OUTPATIENT)
Dept: OBSTETRICS AND GYNECOLOGY | Facility: CLINIC | Age: 33
End: 2023-08-21
Payer: COMMERCIAL

## 2023-08-21 VITALS
DIASTOLIC BLOOD PRESSURE: 74 MMHG | SYSTOLIC BLOOD PRESSURE: 122 MMHG | WEIGHT: 125.75 LBS | BODY MASS INDEX: 21.59 KG/M2

## 2023-08-21 DIAGNOSIS — Z3A.16 16 WEEKS GESTATION OF PREGNANCY: Primary | ICD-10-CM

## 2023-08-21 PROCEDURE — 0502F PR SUBSEQUENT PRENATAL CARE: ICD-10-PCS | Mod: CPTII,S$GLB,, | Performed by: STUDENT IN AN ORGANIZED HEALTH CARE EDUCATION/TRAINING PROGRAM

## 2023-08-21 PROCEDURE — 99999 PR PBB SHADOW E&M-EST. PATIENT-LVL III: CPT | Mod: PBBFAC,,, | Performed by: STUDENT IN AN ORGANIZED HEALTH CARE EDUCATION/TRAINING PROGRAM

## 2023-08-21 PROCEDURE — 0502F SUBSEQUENT PRENATAL CARE: CPT | Mod: CPTII,S$GLB,, | Performed by: STUDENT IN AN ORGANIZED HEALTH CARE EDUCATION/TRAINING PROGRAM

## 2023-08-21 PROCEDURE — 99999 PR PBB SHADOW E&M-EST. PATIENT-LVL III: ICD-10-PCS | Mod: PBBFAC,,, | Performed by: STUDENT IN AN ORGANIZED HEALTH CARE EDUCATION/TRAINING PROGRAM

## 2023-08-21 NOTE — PROGRESS NOTES
at 16w4d   Doing well overall, -LOF/VB/CTX.   PNV compliant. BF: yes. Signed up for [facility] classes! SO did have covid, pt remained asymptomatic. Plans to decrease vyvanse dose. [facility] ED precautions. RTC 4wks or sooner PRN. Presents with SO.    Tachycardia:  EKG, holter, ECHO, MFM consult . Symptoms unchanged, not increasing in frequency or severity.     Leukorrhea of pregnancy, noted about 1 week ago. Has not continued. No leaking water sensation. No fevers, dysuria, hematuria, abnormal vaginal odor or itch. Exam with negative pooling, negative nitrazine x 2, no glistening. On exam today, small amount white/clear discharge. Cervix without lesions/masses, visibly closed, vaginal tissue moist and rugated.

## 2023-08-24 ENCOUNTER — PATIENT MESSAGE (OUTPATIENT)
Dept: OTHER | Facility: OTHER | Age: 33
End: 2023-08-24
Payer: COMMERCIAL

## 2023-09-07 ENCOUNTER — PROCEDURE VISIT (OUTPATIENT)
Dept: MATERNAL FETAL MEDICINE | Facility: CLINIC | Age: 33
End: 2023-09-07
Payer: COMMERCIAL

## 2023-09-07 ENCOUNTER — OFFICE VISIT (OUTPATIENT)
Dept: MATERNAL FETAL MEDICINE | Facility: CLINIC | Age: 33
End: 2023-09-07
Payer: COMMERCIAL

## 2023-09-07 VITALS
HEIGHT: 64 IN | WEIGHT: 135.81 LBS | BODY MASS INDEX: 23.18 KG/M2 | DIASTOLIC BLOOD PRESSURE: 80 MMHG | SYSTOLIC BLOOD PRESSURE: 121 MMHG

## 2023-09-07 DIAGNOSIS — R00.0 TACHYCARDIA: ICD-10-CM

## 2023-09-07 DIAGNOSIS — Z36.89 ENCOUNTER FOR ULTRASOUND TO ASSESS FETAL GROWTH: Primary | ICD-10-CM

## 2023-09-07 DIAGNOSIS — O09.891 MEDICATION EXPOSURE DURING FIRST TRIMESTER OF PREGNANCY: ICD-10-CM

## 2023-09-07 DIAGNOSIS — Z36.89 ENCOUNTER FOR FETAL ANATOMIC SURVEY: ICD-10-CM

## 2023-09-07 PROCEDURE — 3074F SYST BP LT 130 MM HG: CPT | Mod: CPTII,S$GLB,, | Performed by: OBSTETRICS & GYNECOLOGY

## 2023-09-07 PROCEDURE — 3074F PR MOST RECENT SYSTOLIC BLOOD PRESSURE < 130 MM HG: ICD-10-PCS | Mod: CPTII,S$GLB,, | Performed by: OBSTETRICS & GYNECOLOGY

## 2023-09-07 PROCEDURE — 3079F DIAST BP 80-89 MM HG: CPT | Mod: CPTII,S$GLB,, | Performed by: OBSTETRICS & GYNECOLOGY

## 2023-09-07 PROCEDURE — 99999 PR PBB SHADOW E&M-EST. PATIENT-LVL III: ICD-10-PCS | Mod: PBBFAC,,, | Performed by: OBSTETRICS & GYNECOLOGY

## 2023-09-07 PROCEDURE — 3079F PR MOST RECENT DIASTOLIC BLOOD PRESSURE 80-89 MM HG: ICD-10-PCS | Mod: CPTII,S$GLB,, | Performed by: OBSTETRICS & GYNECOLOGY

## 2023-09-07 PROCEDURE — 1159F PR MEDICATION LIST DOCUMENTED IN MEDICAL RECORD: ICD-10-PCS | Mod: CPTII,S$GLB,, | Performed by: OBSTETRICS & GYNECOLOGY

## 2023-09-07 PROCEDURE — 99213 OFFICE O/P EST LOW 20 MIN: CPT | Mod: 25,S$GLB,, | Performed by: OBSTETRICS & GYNECOLOGY

## 2023-09-07 PROCEDURE — 1159F MED LIST DOCD IN RCRD: CPT | Mod: CPTII,S$GLB,, | Performed by: OBSTETRICS & GYNECOLOGY

## 2023-09-07 PROCEDURE — 3008F BODY MASS INDEX DOCD: CPT | Mod: CPTII,S$GLB,, | Performed by: OBSTETRICS & GYNECOLOGY

## 2023-09-07 PROCEDURE — 99999 PR PBB SHADOW E&M-EST. PATIENT-LVL III: CPT | Mod: PBBFAC,,, | Performed by: OBSTETRICS & GYNECOLOGY

## 2023-09-07 PROCEDURE — 76811 US MFM PROCEDURE (VIEWPOINT): ICD-10-PCS | Mod: S$GLB,,, | Performed by: OBSTETRICS & GYNECOLOGY

## 2023-09-07 PROCEDURE — 76811 OB US DETAILED SNGL FETUS: CPT | Mod: S$GLB,,, | Performed by: OBSTETRICS & GYNECOLOGY

## 2023-09-07 PROCEDURE — 3008F PR BODY MASS INDEX (BMI) DOCUMENTED: ICD-10-PCS | Mod: CPTII,S$GLB,, | Performed by: OBSTETRICS & GYNECOLOGY

## 2023-09-07 PROCEDURE — 99213 PR OFFICE/OUTPT VISIT, EST, LEVL III, 20-29 MIN: ICD-10-PCS | Mod: 25,S$GLB,, | Performed by: OBSTETRICS & GYNECOLOGY

## 2023-09-07 NOTE — ASSESSMENT & PLAN NOTE
Controlled with propranolol BID    Recommendations:   EKG sinus tachycardia   Holter monitor: 3 symptomatic periods of sinus tachycardia with no arrhythmia   Echocardiogram normal   Appt with Bryson Nobles MD 10/18/23   Continue propranolol

## 2023-09-07 NOTE — ASSESSMENT & PLAN NOTE
See prior note for full counseling and recommendations.  Currently taking cymbalta and propranolol daily  Unisom for sleep, which works intermittently  Having difficulty concentrating without vyvanse, however can not find this at any pharmacy. She has appointment to discuss ADHD with psychiatry and plans to switch to adderall due to medication availability. Counseled on risks v benefit of adderall.     Recommendations:   Continue Cymbalta & propranolol   Doing well off the trazodone, continue to hold this for now   Doing well off klonopin; can resume episodic use if needed for panic attacks (patient denies history of panic attacks), so anticipate can remain off this   Counseled on risks v benefit of adderall, recommend using lowest effective dose   Recommend growth ultrasound at 32 weeks   Detailed anatomic survey today is normal   Consult NICU to attend delivery given multiple medication exposures at birth and possibility for medication withdrawal

## 2023-09-07 NOTE — PROGRESS NOTES
"Maternal Fetal Medicine follow up consult    SUBJECTIVE:     Gypsy Rivera is a 32 y.o.  female with IUP at 19w0d who is seen in follow up consultation by MFM.  Pregnancy complications include:   Problem   Medication Exposure During First Trimester of Pregnancy   Tachycardia       Previous notes reviewed.   No changes to medical, surgical, family, social, or obstetric history.    Interval history since last M visit: doing well today without complaint. Anxiety and ADHD well controlled with current medications as below.     Medications:  Current Outpatient Medications   Medication Instructions    cetirizine (ZYRTEC) 10 mg, Oral, Daily    clonazePAM (KLONOPIN) 0.5 mg, Oral, 2 times daily PRN    diclofenac sodium (VOLTAREN) 2 g, Topical (Top), Daily PRN, Apply to affected area PRN    diphenhydramine HCl (UNISOM, DIPHENHYDRAMINE, ORAL) Oral, Daily, Once nightly.    DULoxetine (CYMBALTA) 120 mg, Oral    metoclopramide HCl (REGLAN) 5 mg, Oral, 2 times daily PRN    propranoloL (INDERAL) 10 mg, Oral, Daily PRN    pyridoxine HCl, vitamin B6, (VITAMIN B-6 ORAL) Oral, Daily, Once daily in the morning.     traZODone (DESYREL) 50 mg, Oral, Nightly    VYVANSE 70 mg, Oral, Every morning       Care team members:  Kasey Oscar MD - Primary OB     OBJECTIVE:   /80 (BP Location: Left arm, Patient Position: Sitting, BP Method: Large (Automatic))   Ht 5' 4" (1.626 m)   Wt 61.6 kg (135 lb 12.9 oz)   LMP 2023   BMI 23.31 kg/m²     Physical Exam    Ultrasound performed. See viewpoint for full ultrasound report.  A detailed fetal anatomic ultrasound examination was performed today due to the following high risk indication: medication exposure.  No fetal structural abnormalities are identified today.  Fetal size is appropriate for established gestational age ( g).   Transabdominal cervical length is normal.   Placental location is anterior without evidence of previa.       ASSESSMENT/PLAN:     32 " y.o.  female with IUP at 19w0d    Medication exposure during first trimester of pregnancy  See prior note for full counseling and recommendations.  Currently taking cymbalta and propranolol daily  Unisom for sleep, which works intermittently  Having difficulty concentrating without vyvanse, however can not find this at any pharmacy. She has appointment to discuss ADHD with psychiatry and plans to switch to adderall due to medication availability. Counseled on risks v benefit of adderall.     Recommendations:  Continue Cymbalta & propranolol  Doing well off the trazodone, continue to hold this for now  Doing well off klonopin; can resume episodic use if needed for panic attacks (patient denies history of panic attacks), so anticipate can remain off this  Counseled on risks v benefit of adderall, recommend using lowest effective dose  Recommend growth ultrasound at 26-28 weeks  Detailed anatomic survey today is normal  Consult NICU to attend delivery given multiple medication exposures at birth and possibility for medication withdrawal       Tachycardia  Controlled with propranolol BID    Recommendations:  EKG sinus tachycardia  Holter monitor: 3 symptomatic periods of sinus tachycardia with no arrhythmia  Echocardiogram normal  Appt with Bryson Nobles MD 10/18/23  Continue propranolol    Please contact [facility] with any further questions or concerns regarding this patient's care    Ranjeet Márquez MD  PGY 7  Maternal Fetal Medicine  [facility]

## 2023-09-14 ENCOUNTER — PATIENT MESSAGE (OUTPATIENT)
Dept: OTHER | Facility: OTHER | Age: 33
End: 2023-09-14
Payer: COMMERCIAL

## 2023-09-18 ENCOUNTER — PATIENT MESSAGE (OUTPATIENT)
Dept: OBSTETRICS AND GYNECOLOGY | Facility: CLINIC | Age: 33
End: 2023-09-18

## 2023-09-18 ENCOUNTER — ROUTINE PRENATAL (OUTPATIENT)
Dept: OBSTETRICS AND GYNECOLOGY | Facility: CLINIC | Age: 33
End: 2023-09-18
Payer: COMMERCIAL

## 2023-09-18 VITALS
WEIGHT: 142.31 LBS | BODY MASS INDEX: 24.43 KG/M2 | SYSTOLIC BLOOD PRESSURE: 110 MMHG | DIASTOLIC BLOOD PRESSURE: 70 MMHG

## 2023-09-18 DIAGNOSIS — Z3A.20 20 WEEKS GESTATION OF PREGNANCY: Primary | ICD-10-CM

## 2023-09-18 DIAGNOSIS — M48.061 SPINAL STENOSIS OF LUMBAR REGION, UNSPECIFIED WHETHER NEUROGENIC CLAUDICATION PRESENT: ICD-10-CM

## 2023-09-18 PROCEDURE — 0502F PR SUBSEQUENT PRENATAL CARE: ICD-10-PCS | Mod: CPTII,S$GLB,, | Performed by: STUDENT IN AN ORGANIZED HEALTH CARE EDUCATION/TRAINING PROGRAM

## 2023-09-18 PROCEDURE — 0502F SUBSEQUENT PRENATAL CARE: CPT | Mod: CPTII,S$GLB,, | Performed by: STUDENT IN AN ORGANIZED HEALTH CARE EDUCATION/TRAINING PROGRAM

## 2023-09-18 PROCEDURE — 99999 PR PBB SHADOW E&M-EST. PATIENT-LVL III: ICD-10-PCS | Mod: PBBFAC,,, | Performed by: STUDENT IN AN ORGANIZED HEALTH CARE EDUCATION/TRAINING PROGRAM

## 2023-09-18 PROCEDURE — 99999 PR PBB SHADOW E&M-EST. PATIENT-LVL III: CPT | Mod: PBBFAC,,, | Performed by: STUDENT IN AN ORGANIZED HEALTH CARE EDUCATION/TRAINING PROGRAM

## 2023-09-18 NOTE — PROGRESS NOTES
at 20w4d   Doing well overall, -LOF/VB/CTX.     PNV compliant. Flu shot today. MIGU[facility] next visit. RTC 4wks or sooner PRN. [facility] ED precautions. Presents with SO.    MFM: NICU at delivery for multiple medications in 1TMS. Serial growth ultrasound, next 26-28wks.    Sinus Tachycardia:  EKG, holter, ECHO reviewed, s/p MFM consult . Denies recent symptoms.    History of L4 laminectomy for lumbar stenosis/radiculopathy (2022):  OB Anesthesia consult placed.

## 2023-09-26 ENCOUNTER — PATIENT MESSAGE (OUTPATIENT)
Dept: OBSTETRICS AND GYNECOLOGY | Facility: CLINIC | Age: 33
End: 2023-09-26
Payer: COMMERCIAL

## 2023-10-04 ENCOUNTER — TELEPHONE (OUTPATIENT)
Dept: OBSTETRICS AND GYNECOLOGY | Facility: CLINIC | Age: 33
End: 2023-10-04

## 2023-10-04 NOTE — TELEPHONE ENCOUNTER
10/4/23 @ 1553 called pt regarding elevated BP reading of 127/90. No answer left message. Will also send portal message.

## 2023-10-12 ENCOUNTER — PATIENT MESSAGE (OUTPATIENT)
Dept: OTHER | Facility: OTHER | Age: 33
End: 2023-10-12
Payer: COMMERCIAL

## 2023-10-23 ENCOUNTER — LAB VISIT (OUTPATIENT)
Dept: LAB | Facility: OTHER | Age: 33
End: 2023-10-23
Attending: STUDENT IN AN ORGANIZED HEALTH CARE EDUCATION/TRAINING PROGRAM
Payer: COMMERCIAL

## 2023-10-23 ENCOUNTER — ROUTINE PRENATAL (OUTPATIENT)
Dept: OBSTETRICS AND GYNECOLOGY | Facility: CLINIC | Age: 33
End: 2023-10-23
Payer: COMMERCIAL

## 2023-10-23 VITALS — BODY MASS INDEX: 25.85 KG/M2 | WEIGHT: 150.56 LBS

## 2023-10-23 DIAGNOSIS — Z3A.25 25 WEEKS GESTATION OF PREGNANCY: Primary | ICD-10-CM

## 2023-10-23 DIAGNOSIS — Z3A.20 20 WEEKS GESTATION OF PREGNANCY: ICD-10-CM

## 2023-10-23 LAB
BASOPHILS # BLD AUTO: 0.03 K/UL (ref 0–0.2)
BASOPHILS NFR BLD: 0.3 % (ref 0–1.9)
DIFFERENTIAL METHOD: ABNORMAL
EOSINOPHIL # BLD AUTO: 0 K/UL (ref 0–0.5)
EOSINOPHIL NFR BLD: 0.4 % (ref 0–8)
ERYTHROCYTE [DISTWIDTH] IN BLOOD BY AUTOMATED COUNT: 12.6 % (ref 11.5–14.5)
GLUCOSE SERPL-MCNC: 119 MG/DL (ref 70–140)
HCT VFR BLD AUTO: 35.7 % (ref 37–48.5)
HGB BLD-MCNC: 11.5 G/DL (ref 12–16)
IMM GRANULOCYTES # BLD AUTO: 0.22 K/UL (ref 0–0.04)
IMM GRANULOCYTES NFR BLD AUTO: 2.1 % (ref 0–0.5)
LYMPHOCYTES # BLD AUTO: 2.2 K/UL (ref 1–4.8)
LYMPHOCYTES NFR BLD: 20.3 % (ref 18–48)
MCH RBC QN AUTO: 30.5 PG (ref 27–31)
MCHC RBC AUTO-ENTMCNC: 32.2 G/DL (ref 32–36)
MCV RBC AUTO: 95 FL (ref 82–98)
MONOCYTES # BLD AUTO: 0.6 K/UL (ref 0.3–1)
MONOCYTES NFR BLD: 5.8 % (ref 4–15)
NEUTROPHILS # BLD AUTO: 7.6 K/UL (ref 1.8–7.7)
NEUTROPHILS NFR BLD: 71.1 % (ref 38–73)
NRBC BLD-RTO: 0 /100 WBC
PLATELET # BLD AUTO: 262 K/UL (ref 150–450)
PMV BLD AUTO: 11.5 FL (ref 9.2–12.9)
RBC # BLD AUTO: 3.77 M/UL (ref 4–5.4)
WBC # BLD AUTO: 10.73 K/UL (ref 3.9–12.7)

## 2023-10-23 PROCEDURE — 99999 PR PBB SHADOW E&M-EST. PATIENT-LVL III: CPT | Mod: PBBFAC,,, | Performed by: STUDENT IN AN ORGANIZED HEALTH CARE EDUCATION/TRAINING PROGRAM

## 2023-10-23 PROCEDURE — 0502F PR SUBSEQUENT PRENATAL CARE: ICD-10-PCS | Mod: CPTII,S$GLB,, | Performed by: STUDENT IN AN ORGANIZED HEALTH CARE EDUCATION/TRAINING PROGRAM

## 2023-10-23 PROCEDURE — 85025 COMPLETE CBC W/AUTO DIFF WBC: CPT | Performed by: STUDENT IN AN ORGANIZED HEALTH CARE EDUCATION/TRAINING PROGRAM

## 2023-10-23 PROCEDURE — 82950 GLUCOSE TEST: CPT | Performed by: STUDENT IN AN ORGANIZED HEALTH CARE EDUCATION/TRAINING PROGRAM

## 2023-10-23 PROCEDURE — 99999 PR PBB SHADOW E&M-EST. PATIENT-LVL III: ICD-10-PCS | Mod: PBBFAC,,, | Performed by: STUDENT IN AN ORGANIZED HEALTH CARE EDUCATION/TRAINING PROGRAM

## 2023-10-23 PROCEDURE — 36415 COLL VENOUS BLD VENIPUNCTURE: CPT | Performed by: STUDENT IN AN ORGANIZED HEALTH CARE EDUCATION/TRAINING PROGRAM

## 2023-10-23 PROCEDURE — 0502F SUBSEQUENT PRENATAL CARE: CPT | Mod: CPTII,S$GLB,, | Performed by: STUDENT IN AN ORGANIZED HEALTH CARE EDUCATION/TRAINING PROGRAM

## 2023-10-23 NOTE — PROGRESS NOTES
at 25w4d   Doing well overall, +FM, -LOF/VB/CTX.   O'Ernst today. Has noted increase in discharge but does not think LOF. Exam as below, no evidence of ROM.   Work has been better! No acute complaints. [facility] ED precautions. RTC 4wks or sooner PRN.    PHYSICAL EXAM  ABD:  Soft, gravid.  EXTERNAL GENITALIA: No lesions or masses, non-erythematous.  URETHRA: Patent, no discharge.   VAGINA: Pink, moist, rugated, small amount clear discharge.   CERVIX: Nulliparous, no lesions or masses, os closed, no blood or discharge per os, no pooling, negative nitrazine, no glistening, negative cough test.

## 2023-10-26 ENCOUNTER — PROCEDURE VISIT (OUTPATIENT)
Dept: MATERNAL FETAL MEDICINE | Facility: CLINIC | Age: 33
End: 2023-10-26
Payer: COMMERCIAL

## 2023-10-26 ENCOUNTER — OFFICE VISIT (OUTPATIENT)
Dept: MATERNAL FETAL MEDICINE | Facility: CLINIC | Age: 33
End: 2023-10-26
Payer: COMMERCIAL

## 2023-10-26 ENCOUNTER — TELEPHONE (OUTPATIENT)
Dept: OBSTETRICS AND GYNECOLOGY | Facility: CLINIC | Age: 33
End: 2023-10-26
Payer: COMMERCIAL

## 2023-10-26 ENCOUNTER — PATIENT MESSAGE (OUTPATIENT)
Dept: OTHER | Facility: OTHER | Age: 33
End: 2023-10-26
Payer: COMMERCIAL

## 2023-10-26 VITALS
DIASTOLIC BLOOD PRESSURE: 77 MMHG | SYSTOLIC BLOOD PRESSURE: 119 MMHG | HEIGHT: 64 IN | WEIGHT: 147.06 LBS | BODY MASS INDEX: 25.11 KG/M2

## 2023-10-26 DIAGNOSIS — O09.891 MEDICATION EXPOSURE DURING FIRST TRIMESTER OF PREGNANCY: ICD-10-CM

## 2023-10-26 DIAGNOSIS — R00.0 TACHYCARDIA: ICD-10-CM

## 2023-10-26 DIAGNOSIS — R00.0 TACHYCARDIA: Primary | ICD-10-CM

## 2023-10-26 DIAGNOSIS — Z36.89 ENCOUNTER FOR ULTRASOUND TO ASSESS FETAL GROWTH: ICD-10-CM

## 2023-10-26 PROCEDURE — 3008F BODY MASS INDEX DOCD: CPT | Mod: CPTII,S$GLB,, | Performed by: OBSTETRICS & GYNECOLOGY

## 2023-10-26 PROCEDURE — 76816 US MFM PROCEDURE (VIEWPOINT): ICD-10-PCS | Mod: S$GLB,,, | Performed by: OBSTETRICS & GYNECOLOGY

## 2023-10-26 PROCEDURE — 1159F MED LIST DOCD IN RCRD: CPT | Mod: CPTII,S$GLB,, | Performed by: OBSTETRICS & GYNECOLOGY

## 2023-10-26 PROCEDURE — 3074F SYST BP LT 130 MM HG: CPT | Mod: CPTII,S$GLB,, | Performed by: OBSTETRICS & GYNECOLOGY

## 2023-10-26 PROCEDURE — 3074F PR MOST RECENT SYSTOLIC BLOOD PRESSURE < 130 MM HG: ICD-10-PCS | Mod: CPTII,S$GLB,, | Performed by: OBSTETRICS & GYNECOLOGY

## 2023-10-26 PROCEDURE — 1159F PR MEDICATION LIST DOCUMENTED IN MEDICAL RECORD: ICD-10-PCS | Mod: CPTII,S$GLB,, | Performed by: OBSTETRICS & GYNECOLOGY

## 2023-10-26 PROCEDURE — 3008F PR BODY MASS INDEX (BMI) DOCUMENTED: ICD-10-PCS | Mod: CPTII,S$GLB,, | Performed by: OBSTETRICS & GYNECOLOGY

## 2023-10-26 PROCEDURE — 99213 PR OFFICE/OUTPT VISIT, EST, LEVL III, 20-29 MIN: ICD-10-PCS | Mod: 25,S$GLB,, | Performed by: OBSTETRICS & GYNECOLOGY

## 2023-10-26 PROCEDURE — 3078F DIAST BP <80 MM HG: CPT | Mod: CPTII,S$GLB,, | Performed by: OBSTETRICS & GYNECOLOGY

## 2023-10-26 PROCEDURE — 76816 OB US FOLLOW-UP PER FETUS: CPT | Mod: S$GLB,,, | Performed by: OBSTETRICS & GYNECOLOGY

## 2023-10-26 PROCEDURE — 99999 PR PBB SHADOW E&M-EST. PATIENT-LVL III: CPT | Mod: PBBFAC,,, | Performed by: OBSTETRICS & GYNECOLOGY

## 2023-10-26 PROCEDURE — 99999 PR PBB SHADOW E&M-EST. PATIENT-LVL III: ICD-10-PCS | Mod: PBBFAC,,, | Performed by: OBSTETRICS & GYNECOLOGY

## 2023-10-26 PROCEDURE — 99213 OFFICE O/P EST LOW 20 MIN: CPT | Mod: 25,S$GLB,, | Performed by: OBSTETRICS & GYNECOLOGY

## 2023-10-26 PROCEDURE — 3078F PR MOST RECENT DIASTOLIC BLOOD PRESSURE < 80 MM HG: ICD-10-PCS | Mod: CPTII,S$GLB,, | Performed by: OBSTETRICS & GYNECOLOGY

## 2023-10-26 RX ORDER — DEXTROAMPHETAMINE SACCHARATE, AMPHETAMINE ASPARTATE MONOHYDRATE, DEXTROAMPHETAMINE SULFATE AND AMPHETAMINE SULFATE 7.5; 7.5; 7.5; 7.5 MG/1; MG/1; MG/1; MG/1
CAPSULE, EXTENDED RELEASE ORAL EVERY MORNING
COMMUNITY
Start: 2023-10-24 | End: 2024-01-02 | Stop reason: SDUPTHER

## 2023-10-26 NOTE — TELEPHONE ENCOUNTER
----- Message from Barber Amanda MA sent at 10/26/2023  2:57 PM CDT -----  Good afternoon,    This patient was seen by MFM today and is going to need an appointment for a growth ultrasound in your office in 4wks.    Thanks,    Barber Amanda MA

## 2023-10-26 NOTE — ASSESSMENT & PLAN NOTE
See prior note for full counseling and recommendations.  Currently taking cymbalta and propranolol daily. Added Adderall XR 30 mg qD, which has significantly improved symptoms. Was counseled on this medication at last visit.  Unisom for sleep, which works intermittently    Recommendations:   Continue Cymbalta, Propranolol, and Adderall XR   Doing well off the trazodone, continue to hold this for now   Doing well off klonopin; can resume episodic use if needed for panic attacks (patient denies history of panic attacks), so anticipate can remain off this   Recommend using lowest effective dose   Recommend serial growth assessment    Detailed anatomic survey is normal   Consult NICU to attend delivery given multiple medication exposures at birth and possibility for medication withdrawal

## 2023-10-26 NOTE — ASSESSMENT & PLAN NOTE
Reports resolution of symptoms with addition of adderall.  Controlled with propranolol BID. Monitoring at home.     Recommendations:   EKG sinus tachycardia   Holter monitor: 3 symptomatic periods of sinus tachycardia with no arrhythmia   Echocardiogram normal   Continue propranolol

## 2023-10-26 NOTE — PROGRESS NOTES
"Maternal Fetal Medicine follow up consult    SUBJECTIVE:     Gypsy Rivera is a 32 y.o.  female with IUP at 26w0d who is seen in follow up consultation by M.  Pregnancy complications include:   Problem   Medication Exposure During First Trimester of Pregnancy   Tachycardia       Previous notes reviewed.   No changes to medical, surgical, family, social, or obstetric history.    Interval history since last [facility] visit:   Patient doing well today. She was started on Adderall XR 30 mg qD, which she is tolerating well. No complaints.    Medications:  Current Outpatient Medications   Medication Instructions    cetirizine (ZYRTEC) 10 mg, Oral, Daily    clonazePAM (KLONOPIN) 0.5 mg, Oral, 2 times daily PRN    dextroamphetamine-amphetamine (ADDERALL XR) 30 MG 24 hr capsule Oral, Every morning         diphenhydramine HCl (UNISOM, DIPHENHYDRAMINE, ORAL) Oral, Daily, Once nightly.    DULoxetine (CYMBALTA) 120 mg, Oral         prenatal 25/iron fum/folic/dha (PRENATAL-1 ORAL) Oral    propranoloL (INDERAL) 10 mg, Oral, Daily PRN       Care team members:  Dr. Oscar - Primary OB     OBJECTIVE:   /77 (BP Location: Left arm, Patient Position: Sitting, BP Method: Large (Automatic))   Ht 5' 4" (1.626 m)   Wt 66.7 kg (147 lb 0.8 oz)   LMP 2023   BMI 25.24 kg/m²     Ultrasound performed. See viewpoint for full ultrasound report.      Significant labs/imaging:  N/A    ASSESSMENT/PLAN:     32 y.o.  female with IUP at 26w0d    Medication exposure during first trimester of pregnancy  See prior note for full counseling and recommendations.  Currently taking cymbalta and propranolol daily. Added Adderall XR 30 mg qD, which has significantly improved symptoms. Was counseled on this medication at last visit.  Unisom for sleep, which works intermittently    Recommendations:  Continue Cymbalta, Propranolol, and Adderall XR  Doing well off the trazodone, continue to hold this for now  Doing well off klonopin; " can resume episodic use if needed for panic attacks (patient denies history of panic attacks), so anticipate can remain off this  Recommend using lowest effective dose  Recommend serial growth assessment   Detailed anatomic survey is normal  Consult NICU to attend delivery given multiple medication exposures at birth and possibility for medication withdrawal       Tachycardia  Reports resolution of symptoms with addition of adderall.  Controlled with propranolol BID. Monitoring at home.     Recommendations:  EKG sinus tachycardia  Holter monitor: 3 symptomatic periods of sinus tachycardia with no arrhythmia  Echocardiogram normal  Continue propranolol    Follow up:  - No further MFM follow up scheduled. Continue serial growth assessments with primary OB. Message sent to primary OB office for scheduling.     Emily Carey MD  PGY 6  Maternal Fetal Medicine  [facility]

## 2023-10-27 NOTE — TELEPHONE ENCOUNTER
"----- Message from Kasey Oscar MD sent at 10/26/2023  8:17 PM CDT -----  Please schedule repeat ultrasound for growth to be in about 5 weeks. Indication:  "other specified complications of pregnancy" (medication exposures in pregnancy).  Thank you!  "

## 2023-10-27 NOTE — TELEPHONE ENCOUNTER
Called pt to schedule US  No answer  Left detailed voice message as well as call back numner    Sent pt portal message  Order placed with pt charge    Dx per MFM: tachycardia      ND

## 2023-11-06 ENCOUNTER — ROUTINE PRENATAL (OUTPATIENT)
Dept: OBSTETRICS AND GYNECOLOGY | Facility: CLINIC | Age: 33
End: 2023-11-06
Payer: COMMERCIAL

## 2023-11-06 VITALS — BODY MASS INDEX: 26.09 KG/M2 | DIASTOLIC BLOOD PRESSURE: 64 MMHG | SYSTOLIC BLOOD PRESSURE: 118 MMHG | WEIGHT: 152 LBS

## 2023-11-06 DIAGNOSIS — Z3A.27 27 WEEKS GESTATION OF PREGNANCY: Primary | ICD-10-CM

## 2023-11-06 PROCEDURE — 90471 TDAP VACCINE GREATER THAN OR EQUAL TO 7YO IM: ICD-10-PCS | Mod: S$GLB,,, | Performed by: STUDENT IN AN ORGANIZED HEALTH CARE EDUCATION/TRAINING PROGRAM

## 2023-11-06 PROCEDURE — 90471 IMMUNIZATION ADMIN: CPT | Mod: S$GLB,,, | Performed by: STUDENT IN AN ORGANIZED HEALTH CARE EDUCATION/TRAINING PROGRAM

## 2023-11-06 PROCEDURE — 90715 TDAP VACCINE GREATER THAN OR EQUAL TO 7YO IM: ICD-10-PCS | Mod: S$GLB,,, | Performed by: STUDENT IN AN ORGANIZED HEALTH CARE EDUCATION/TRAINING PROGRAM

## 2023-11-06 PROCEDURE — 0502F SUBSEQUENT PRENATAL CARE: CPT | Mod: CPTII,S$GLB,, | Performed by: STUDENT IN AN ORGANIZED HEALTH CARE EDUCATION/TRAINING PROGRAM

## 2023-11-06 PROCEDURE — 0502F PR SUBSEQUENT PRENATAL CARE: ICD-10-PCS | Mod: CPTII,S$GLB,, | Performed by: STUDENT IN AN ORGANIZED HEALTH CARE EDUCATION/TRAINING PROGRAM

## 2023-11-06 PROCEDURE — 99999 PR PBB SHADOW E&M-EST. PATIENT-LVL III: CPT | Mod: PBBFAC,,, | Performed by: STUDENT IN AN ORGANIZED HEALTH CARE EDUCATION/TRAINING PROGRAM

## 2023-11-06 PROCEDURE — 99999 PR PBB SHADOW E&M-EST. PATIENT-LVL III: ICD-10-PCS | Mod: PBBFAC,,, | Performed by: STUDENT IN AN ORGANIZED HEALTH CARE EDUCATION/TRAINING PROGRAM

## 2023-11-06 PROCEDURE — 90715 TDAP VACCINE 7 YRS/> IM: CPT | Mod: S$GLB,,, | Performed by: STUDENT IN AN ORGANIZED HEALTH CARE EDUCATION/TRAINING PROGRAM

## 2023-11-06 NOTE — PROGRESS NOTES
at 27w4d   Doing well overall, +FM, -LOF/VB/CTX.   OB Anesthesia consult 3TMS order. HR has been normal. TDAP today. Discussed delivery/resident/hospitalist team. RTC 2-3wks or sooner PRN.

## 2023-11-07 ENCOUNTER — TELEPHONE (OUTPATIENT)
Dept: OBSTETRICS AND GYNECOLOGY | Facility: CLINIC | Age: 33
End: 2023-11-07
Payer: COMMERCIAL

## 2023-11-07 NOTE — TELEPHONE ENCOUNTER
Called pt to follow up in regards to scheduling growth US week after Thanksgiving 11-23-23    No answer  LVM and call back number  Stated can call back or reply to previous portal message sent to schedule  Order is within pt chart    2nd attempt to schedule      ND

## 2023-11-07 NOTE — TELEPHONE ENCOUNTER
"----- Message from Peyton Madrigal MA sent at 10/27/2023  4:36 PM CDT -----  Reminder to self  Attempted to call pt   ----- Message -----  From: Kasey Oscar MD  Sent: 10/26/2023   8:17 PM CDT  To: Kasey Oscar Staff    Please schedule repeat ultrasound for growth to be in about 5 weeks. Indication:  "other specified complications of pregnancy" (medication exposures in pregnancy).  Thank you!    "

## 2023-11-27 ENCOUNTER — PROCEDURE VISIT (OUTPATIENT)
Dept: OBSTETRICS AND GYNECOLOGY | Facility: CLINIC | Age: 33
End: 2023-11-27
Payer: COMMERCIAL

## 2023-11-27 DIAGNOSIS — R00.0 TACHYCARDIA: ICD-10-CM

## 2023-11-27 PROCEDURE — 76816 US OB/GYN EXTENDED PROCEDURE (VIEWPOINT): ICD-10-PCS | Mod: S$GLB,,, | Performed by: OBSTETRICS & GYNECOLOGY

## 2023-11-27 PROCEDURE — 76816 OB US FOLLOW-UP PER FETUS: CPT | Mod: S$GLB,,, | Performed by: OBSTETRICS & GYNECOLOGY

## 2023-11-30 ENCOUNTER — HOSPITAL ENCOUNTER (OUTPATIENT)
Facility: OTHER | Age: 33
Discharge: HOME | End: 2023-12-01
Attending: OBSTETRICS & GYNECOLOGY | Admitting: OBSTETRICS & GYNECOLOGY
Payer: COMMERCIAL

## 2023-11-30 ENCOUNTER — PATIENT MESSAGE (OUTPATIENT)
Dept: OBSTETRICS AND GYNECOLOGY | Facility: CLINIC | Age: 33
End: 2023-11-30
Payer: COMMERCIAL

## 2023-11-30 ENCOUNTER — ANESTHESIA (OUTPATIENT)
Dept: OBSTETRICS AND GYNECOLOGY | Facility: OTHER | Age: 33
End: 2023-11-30

## 2023-11-30 ENCOUNTER — TELEPHONE (OUTPATIENT)
Dept: OBSTETRICS AND GYNECOLOGY | Facility: CLINIC | Age: 33
End: 2023-11-30
Payer: COMMERCIAL

## 2023-11-30 ENCOUNTER — ANESTHESIA EVENT (OUTPATIENT)
Dept: OBSTETRICS AND GYNECOLOGY | Facility: OTHER | Age: 33
End: 2023-11-30

## 2023-11-30 DIAGNOSIS — R10.9 FLANK PAIN IN PREGNANT PATIENT: ICD-10-CM

## 2023-11-30 DIAGNOSIS — O26.899 FLANK PAIN IN PREGNANT PATIENT: ICD-10-CM

## 2023-11-30 DIAGNOSIS — R11.2 NAUSEA AND VOMITING, UNSPECIFIED VOMITING TYPE: ICD-10-CM

## 2023-11-30 DIAGNOSIS — M54.9 BACK PAIN, UNSPECIFIED BACK LOCATION, UNSPECIFIED BACK PAIN LATERALITY, UNSPECIFIED CHRONICITY: ICD-10-CM

## 2023-11-30 DIAGNOSIS — Z3A.31 31 WEEKS GESTATION OF PREGNANCY: ICD-10-CM

## 2023-11-30 DIAGNOSIS — N20.0 KIDNEY STONES: Primary | ICD-10-CM

## 2023-11-30 PROBLEM — F90.9 ADHD: Status: ACTIVE | Noted: 2023-11-30

## 2023-11-30 PROBLEM — F41.9 ANXIETY: Status: ACTIVE | Noted: 2023-11-30

## 2023-11-30 PROBLEM — Z98.890 HISTORY OF BACK SURGERY: Status: ACTIVE | Noted: 2023-11-30

## 2023-11-30 LAB
ABO + RH BLD: NORMAL
ALBUMIN SERPL BCP-MCNC: 2.8 G/DL (ref 3.5–5.2)
ALP SERPL-CCNC: 96 U/L (ref 55–135)
ALT SERPL W/O P-5'-P-CCNC: 8 U/L (ref 10–44)
ANION GAP SERPL CALC-SCNC: 10 MMOL/L (ref 8–16)
AST SERPL-CCNC: 12 U/L (ref 10–40)
BASOPHILS # BLD AUTO: 0.03 K/UL (ref 0–0.2)
BASOPHILS NFR BLD: 0.3 % (ref 0–1.9)
BILIRUB SERPL-MCNC: 0.5 MG/DL (ref 0.1–1)
BILIRUB SERPL-MCNC: NORMAL MG/DL
BLD GP AB SCN CELLS X3 SERPL QL: NORMAL
BLOOD URINE, POC: NORMAL
BUN SERPL-MCNC: 7 MG/DL (ref 6–20)
CALCIUM SERPL-MCNC: 8.8 MG/DL (ref 8.7–10.5)
CHLORIDE SERPL-SCNC: 106 MMOL/L (ref 95–110)
CO2 SERPL-SCNC: 20 MMOL/L (ref 23–29)
COLOR, POC UA: NORMAL
CREAT SERPL-MCNC: 0.8 MG/DL (ref 0.5–1.4)
CREAT UR-MCNC: 152 MG/DL (ref 15–325)
DIFFERENTIAL METHOD: ABNORMAL
EOSINOPHIL # BLD AUTO: 0 K/UL (ref 0–0.5)
EOSINOPHIL NFR BLD: 0.3 % (ref 0–8)
ERYTHROCYTE [DISTWIDTH] IN BLOOD BY AUTOMATED COUNT: 13 % (ref 11.5–14.5)
EST. GFR  (NO RACE VARIABLE): >60 ML/MIN/1.73 M^2
GLUCOSE SERPL-MCNC: 87 MG/DL (ref 70–110)
GLUCOSE UR QL STRIP: NORMAL
HCT VFR BLD AUTO: 33.8 % (ref 37–48.5)
HGB BLD-MCNC: 11.4 G/DL (ref 12–16)
IMM GRANULOCYTES # BLD AUTO: 0.22 K/UL (ref 0–0.04)
IMM GRANULOCYTES NFR BLD AUTO: 1.8 % (ref 0–0.5)
KETONES UR QL STRIP: NORMAL
LEUKOCYTE ESTERASE URINE, POC: NORMAL
LYMPHOCYTES # BLD AUTO: 1.8 K/UL (ref 1–4.8)
LYMPHOCYTES NFR BLD: 14.7 % (ref 18–48)
MCH RBC QN AUTO: 30.8 PG (ref 27–31)
MCHC RBC AUTO-ENTMCNC: 33.7 G/DL (ref 32–36)
MCV RBC AUTO: 91 FL (ref 82–98)
MONOCYTES # BLD AUTO: 0.8 K/UL (ref 0.3–1)
MONOCYTES NFR BLD: 6.7 % (ref 4–15)
NEUTROPHILS # BLD AUTO: 9.1 K/UL (ref 1.8–7.7)
NEUTROPHILS NFR BLD: 76.2 % (ref 38–73)
NITRITE, POC UA: NORMAL
NRBC BLD-RTO: 0 /100 WBC
PH, POC UA: 6
PLATELET # BLD AUTO: 211 K/UL (ref 150–450)
PMV BLD AUTO: 11.7 FL (ref 9.2–12.9)
POTASSIUM SERPL-SCNC: 4.1 MMOL/L (ref 3.5–5.1)
PROT SERPL-MCNC: 7 G/DL (ref 6–8.4)
PROT UR-MCNC: 23 MG/DL (ref 0–15)
PROT/CREAT UR: 0.15 MG/G{CREAT} (ref 0–0.2)
PROTEIN, POC: NORMAL
RBC # BLD AUTO: 3.7 M/UL (ref 4–5.4)
SODIUM SERPL-SCNC: 136 MMOL/L (ref 136–145)
SPECIFIC GRAVITY, POC UA: 1
SPECIMEN OUTDATE: NORMAL
UROBILINOGEN, POC UA: NORMAL
WBC # BLD AUTO: 11.94 K/UL (ref 3.9–12.7)

## 2023-11-30 PROCEDURE — 63600175 PHARM REV CODE 636 W HCPCS

## 2023-11-30 PROCEDURE — 59025 PR FETAL 2N-STRESS TEST: ICD-10-PCS | Mod: 26,,, | Performed by: OBSTETRICS & GYNECOLOGY

## 2023-11-30 PROCEDURE — 99285 EMERGENCY DEPT VISIT HI MDM: CPT | Mod: 25

## 2023-11-30 PROCEDURE — G0378 HOSPITAL OBSERVATION PER HR: HCPCS

## 2023-11-30 PROCEDURE — 99285 EMERGENCY DEPT VISIT HI MDM: CPT | Mod: 25,,, | Performed by: OBSTETRICS & GYNECOLOGY

## 2023-11-30 PROCEDURE — 25000003 PHARM REV CODE 250

## 2023-11-30 PROCEDURE — 84156 ASSAY OF PROTEIN URINE: CPT

## 2023-11-30 PROCEDURE — 86850 RBC ANTIBODY SCREEN: CPT

## 2023-11-30 PROCEDURE — 59025 FETAL NON-STRESS TEST: CPT

## 2023-11-30 PROCEDURE — 96361 HYDRATE IV INFUSION ADD-ON: CPT | Mod: 59

## 2023-11-30 PROCEDURE — 85025 COMPLETE CBC W/AUTO DIFF WBC: CPT

## 2023-11-30 PROCEDURE — 96375 TX/PRO/DX INJ NEW DRUG ADDON: CPT

## 2023-11-30 PROCEDURE — 59025 FETAL NON-STRESS TEST: CPT | Mod: 26,,, | Performed by: OBSTETRICS & GYNECOLOGY

## 2023-11-30 PROCEDURE — 96374 THER/PROPH/DIAG INJ IV PUSH: CPT

## 2023-11-30 PROCEDURE — 99285 PR EMERGENCY DEPT VISIT,LEVEL V: ICD-10-PCS | Mod: 25,,, | Performed by: OBSTETRICS & GYNECOLOGY

## 2023-11-30 PROCEDURE — 96361 HYDRATE IV INFUSION ADD-ON: CPT

## 2023-11-30 PROCEDURE — 80053 COMPREHEN METABOLIC PANEL: CPT

## 2023-11-30 PROCEDURE — 36415 COLL VENOUS BLD VENIPUNCTURE: CPT

## 2023-11-30 RX ORDER — SODIUM CHLORIDE, SODIUM LACTATE, POTASSIUM CHLORIDE, CALCIUM CHLORIDE 600; 310; 30; 20 MG/100ML; MG/100ML; MG/100ML; MG/100ML
INJECTION, SOLUTION INTRAVENOUS CONTINUOUS
Status: DISCONTINUED | OUTPATIENT
Start: 2023-11-30 | End: 2023-12-01 | Stop reason: HOSPADM

## 2023-11-30 RX ORDER — DIPHENHYDRAMINE HYDROCHLORIDE 50 MG/ML
25 INJECTION INTRAMUSCULAR; INTRAVENOUS EVERY 4 HOURS PRN
Status: DISCONTINUED | OUTPATIENT
Start: 2023-11-30 | End: 2023-12-01 | Stop reason: HOSPADM

## 2023-11-30 RX ORDER — HYDROMORPHONE HYDROCHLORIDE 1 MG/ML
1 INJECTION, SOLUTION INTRAMUSCULAR; INTRAVENOUS; SUBCUTANEOUS ONCE
Status: COMPLETED | OUTPATIENT
Start: 2023-11-30 | End: 2023-11-30

## 2023-11-30 RX ORDER — DULOXETIN HYDROCHLORIDE 30 MG/1
120 CAPSULE, DELAYED RELEASE ORAL DAILY
Status: DISCONTINUED | OUTPATIENT
Start: 2023-12-01 | End: 2023-12-01 | Stop reason: HOSPADM

## 2023-11-30 RX ORDER — ONDANSETRON 8 MG/1
8 TABLET, ORALLY DISINTEGRATING ORAL EVERY 8 HOURS PRN
Status: DISCONTINUED | OUTPATIENT
Start: 2023-11-30 | End: 2023-12-01 | Stop reason: HOSPADM

## 2023-11-30 RX ORDER — PROPRANOLOL HYDROCHLORIDE 10 MG/1
10 TABLET ORAL 2 TIMES DAILY
Status: DISCONTINUED | OUTPATIENT
Start: 2023-11-30 | End: 2023-12-01 | Stop reason: HOSPADM

## 2023-11-30 RX ORDER — ACETAMINOPHEN 325 MG/1
650 TABLET ORAL EVERY 6 HOURS PRN
Status: DISCONTINUED | OUTPATIENT
Start: 2023-11-30 | End: 2023-12-01 | Stop reason: HOSPADM

## 2023-11-30 RX ORDER — TAMSULOSIN HYDROCHLORIDE 0.4 MG/1
0.4 CAPSULE ORAL ONCE
Status: COMPLETED | OUTPATIENT
Start: 2023-11-30 | End: 2023-11-30

## 2023-11-30 RX ORDER — OXYCODONE HYDROCHLORIDE 10 MG/1
10 TABLET ORAL EVERY 4 HOURS PRN
Status: DISCONTINUED | OUTPATIENT
Start: 2023-11-30 | End: 2023-12-01 | Stop reason: HOSPADM

## 2023-11-30 RX ORDER — TAMSULOSIN HYDROCHLORIDE 0.4 MG/1
0.4 CAPSULE ORAL DAILY
Status: DISCONTINUED | OUTPATIENT
Start: 2023-12-01 | End: 2023-12-01 | Stop reason: HOSPADM

## 2023-11-30 RX ORDER — FAMOTIDINE 20 MG/1
20 TABLET, FILM COATED ORAL 2 TIMES DAILY
Status: DISCONTINUED | OUTPATIENT
Start: 2023-11-30 | End: 2023-12-01 | Stop reason: HOSPADM

## 2023-11-30 RX ORDER — CETIRIZINE HYDROCHLORIDE 5 MG/1
10 TABLET ORAL DAILY
Status: DISCONTINUED | OUTPATIENT
Start: 2023-12-01 | End: 2023-12-01 | Stop reason: HOSPADM

## 2023-11-30 RX ORDER — SIMETHICONE 80 MG
1 TABLET,CHEWABLE ORAL EVERY 6 HOURS PRN
Status: DISCONTINUED | OUTPATIENT
Start: 2023-11-30 | End: 2023-12-01 | Stop reason: HOSPADM

## 2023-11-30 RX ORDER — ACETAMINOPHEN 325 MG/1
650 TABLET ORAL EVERY 6 HOURS
Status: DISCONTINUED | OUTPATIENT
Start: 2023-11-30 | End: 2023-11-30

## 2023-11-30 RX ORDER — OXYCODONE HYDROCHLORIDE 5 MG/1
5 TABLET ORAL EVERY 4 HOURS PRN
Status: DISCONTINUED | OUTPATIENT
Start: 2023-11-30 | End: 2023-12-01 | Stop reason: HOSPADM

## 2023-11-30 RX ORDER — ACETAMINOPHEN 325 MG/1
650 TABLET ORAL EVERY 6 HOURS PRN
Status: DISCONTINUED | OUTPATIENT
Start: 2023-11-30 | End: 2023-11-30

## 2023-11-30 RX ORDER — DIPHENHYDRAMINE HCL 25 MG
25 CAPSULE ORAL EVERY 4 HOURS PRN
Status: DISCONTINUED | OUTPATIENT
Start: 2023-11-30 | End: 2023-12-01 | Stop reason: HOSPADM

## 2023-11-30 RX ORDER — AMOXICILLIN 250 MG
1 CAPSULE ORAL NIGHTLY PRN
Status: DISCONTINUED | OUTPATIENT
Start: 2023-11-30 | End: 2023-12-01 | Stop reason: HOSPADM

## 2023-11-30 RX ORDER — SODIUM CHLORIDE 0.9 % (FLUSH) 0.9 %
10 SYRINGE (ML) INJECTION
Status: DISCONTINUED | OUTPATIENT
Start: 2023-11-30 | End: 2023-12-01 | Stop reason: HOSPADM

## 2023-11-30 RX ORDER — PROCHLORPERAZINE EDISYLATE 5 MG/ML
5 INJECTION INTRAMUSCULAR; INTRAVENOUS EVERY 6 HOURS PRN
Status: DISCONTINUED | OUTPATIENT
Start: 2023-11-30 | End: 2023-12-01 | Stop reason: HOSPADM

## 2023-11-30 RX ORDER — PRENATAL WITH FERROUS FUM AND FOLIC ACID 3080; 920; 120; 400; 22; 1.84; 3; 20; 10; 1; 12; 200; 27; 25; 2 [IU]/1; [IU]/1; MG/1; [IU]/1; MG/1; MG/1; MG/1; MG/1; MG/1; MG/1; UG/1; MG/1; MG/1; MG/1; MG/1
1 TABLET ORAL DAILY
Status: DISCONTINUED | OUTPATIENT
Start: 2023-12-01 | End: 2023-12-01 | Stop reason: HOSPADM

## 2023-11-30 RX ADMIN — SODIUM CHLORIDE, POTASSIUM CHLORIDE, SODIUM LACTATE AND CALCIUM CHLORIDE: 600; 310; 30; 20 INJECTION, SOLUTION INTRAVENOUS at 04:11

## 2023-11-30 RX ADMIN — HYDROMORPHONE HYDROCHLORIDE 1 MG: 0.5 INJECTION, SOLUTION INTRAMUSCULAR; INTRAVENOUS; SUBCUTANEOUS at 01:11

## 2023-11-30 RX ADMIN — FAMOTIDINE 20 MG: 20 TABLET ORAL at 03:11

## 2023-11-30 RX ADMIN — SODIUM CHLORIDE, POTASSIUM CHLORIDE, SODIUM LACTATE AND CALCIUM CHLORIDE 1000 ML: 600; 310; 30; 20 INJECTION, SOLUTION INTRAVENOUS at 12:11

## 2023-11-30 RX ADMIN — PROCHLORPERAZINE EDISYLATE 5 MG: 5 INJECTION INTRAMUSCULAR; INTRAVENOUS at 05:11

## 2023-11-30 RX ADMIN — ONDANSETRON 8 MG: 8 TABLET, ORALLY DISINTEGRATING ORAL at 03:11

## 2023-11-30 RX ADMIN — HYDROMORPHONE HYDROCHLORIDE 1 MG: 0.5 INJECTION, SOLUTION INTRAMUSCULAR; INTRAVENOUS; SUBCUTANEOUS at 12:11

## 2023-11-30 RX ADMIN — PROPRANOLOL HYDROCHLORIDE 10 MG: 10 TABLET ORAL at 08:11

## 2023-11-30 RX ADMIN — TAMSULOSIN HYDROCHLORIDE 0.4 MG: 0.4 CAPSULE ORAL at 01:11

## 2023-11-30 RX ADMIN — SODIUM CHLORIDE, POTASSIUM CHLORIDE, SODIUM LACTATE AND CALCIUM CHLORIDE 1000 ML: 600; 310; 30; 20 INJECTION, SOLUTION INTRAVENOUS at 01:11

## 2023-11-30 NOTE — ANESTHESIA PREPROCEDURE EVALUATION
[facility]  Obstetric Anesthesia Pre-Procedure Evaluation         Patient Name: Gypsy Rivera  YOB: 1990  MRN: [id number]    2023    SUBJECTIVE:     Gypsy Rivera is a 33 y.o. female  at 31w0d who presents from [facility] for back pain 2/2 kidney stones.    Current IUP has been complicated by symptomatic tachycardia and medication exposure in 1T     Of note, pt has PSH of Left L4 laminectomy, L4-L5 lumbar discectomy in . Pt planning on being seen in OB Anesthesia clinic prior to induction.     She denies history of HTN, asthma, bleeding or coagulation disorders.     Echo 23:    Interpretation Summary  · The left ventricle is normal in size with concentric remodeling and normal systolic function.  · The estimated ejection fraction is 70%.  · Normal left ventricular diastolic function.  · Normal right ventricular size with normal right ventricular systolic function.  · Normal central venous pressure (3 mmHg).  · The estimated PA systolic pressure is 22 mmHg.      OB History    Para Term  AB Living   1 0 0 0 0     SAB IAB Ectopic Multiple Live Births   0 0 0          # Outcome Date GA Lbr Richard/2nd Weight Sex Delivery Anes PTL Lv   1 Current                Review of patient's allergies indicates:  No Known Allergies    Patient Active Problem List   Diagnosis    Kidney stones    Lumbar strain    Nephrolithiasis    Nasal deformity    Medication exposure during first trimester of pregnancy    Tachycardia       Past Surgical History:   Procedure Laterality Date    BACK SURGERY  2022    COSMETIC SURGERY      rhinoplasty       Tobacco Use: Low Risk  (2023)    Patient History     Smoking Tobacco Use: Never     Smokeless Tobacco Use: Never     Passive Exposure: Not on file     Alcohol Use: Not on file     Social History     Substance and Sexual Activity   Drug Use No       OBJECTIVE:     Vital Signs:  Temp:  [36.5 °C (97.7 °F)]   Pulse:  []  "  Resp:  [18-20]   BP: (122-137)/(84-94)   SpO2:  [96 %-98 %]       Wt Readings from Last 1 Encounters:   11/06/23 1122 68.9 kg (152 lb 0.1 oz)       BP Readings from Last 3 Encounters:   11/30/23 (!) 137/94   11/06/23 118/64   10/26/23 119/77       Significant Labs    Heme Profile  Lab Results   Component Value Date    WBC 11.94 11/30/2023    HGB 11.4 (L) 11/30/2023    HCT 33.8 (L) 11/30/2023     11/30/2023       Coagulation Studies  Lab Results   Component Value Date    LABPROT 12.7 09/06/2022    INR 0.9 09/06/2022    APTT 22.5 (L) 09/06/2022       BMP  Lab Results   Component Value Date     11/30/2023    K 4.1 11/30/2023     11/30/2023    CO2 20 (L) 11/30/2023    BUN 7 11/30/2023    CREATININE 0.8 11/30/2023    MG 1.9 02/01/2023    PHOS 3.2 11/27/2015       Liver Function Tests  Lab Results   Component Value Date    AST 12 11/30/2023    ALT 8 (L) 11/30/2023    ALKPHOS 96 11/30/2023    BILITOT 0.5 11/30/2023    PROT 7.0 11/30/2023    ALBUMIN 2.8 (L) 11/30/2023       Endocrine Profile  No results found for: "HGBA1C", "TSH"    Cardiac Studies    EKG:   Results for orders placed or performed during the hospital encounter of 07/19/23   EKG 12-lead    Collection Time: 07/19/23  2:16 PM    Narrative    Test Reason : R00.0,    Vent. Rate : 117 BPM     Atrial Rate : 117 BPM     P-R Int : 126 ms          QRS Dur : 076 ms      QT Int : 322 ms       P-R-T Axes : 071 068 015 degrees     QTc Int : 449 ms    Sinus tachycardia  Otherwise normal ECG  No previous ECGs available  Confirmed by GREGORIO KING MD ([id number]) on 7/19/2023 2:45:41 PM    Referred By: RIO JACOME           Confirmed By:GREGORIO KING MD         ASSESSMENT/PLAN:         Pre-op Assessment    I have reviewed the Patient Summary Reports.     I have reviewed the Nursing Notes.    I have reviewed the Medications.     Review of Systems  Anesthesia Hx:  No problems with previous Anesthesia             Denies Family Hx of Anesthesia " complications.    Denies Personal Hx of Anesthesia complications.                    Hematology/Oncology:  Hematology Normal   Oncology Normal                                   EENT/Dental:  EENT/Dental Normal           Cardiovascular:         Dysrhythmias                                    Pulmonary:  Pulmonary Normal                       Renal/:  Renal/ Normal                 Hepatic/GI:  Hepatic/GI Normal                 Musculoskeletal:         Spine Disorders: lumbar            Neurological:  Neurology Normal                                      Endocrine:  Endocrine Normal            Dermatological:  Skin Normal    Psych:  Psychiatric Normal                    Physical Exam  General: Cooperative, Alert and Oriented    Airway:  Mallampati: II   Mouth Opening: Normal  Tongue: Normal  Neck ROM: Extension Decreased    Dental:  Intact        Anesthesia Plan  Type of Anesthesia, risks & benefits discussed:    Anesthesia Type: Gen ETT, Epidural, Gen Natural Airway, Spinal, CSE  Intra-op Monitoring Plan: Standard ASA Monitors  Post Op Pain Control Plan: multimodal analgesia and IV/PO Opioids PRN  Induction:  IV  Airway Plan: Direct and Video  Informed Consent: Informed consent signed with the Patient and all parties understand the risks and agree with anesthesia plan.  All questions answered.   ASA Score: 2  Day of Surgery Review of History & Physical: H&P Update referred to the surgeon/provider.    Ready For Surgery From Anesthesia Perspective.     .

## 2023-11-30 NOTE — HOSPITAL COURSE
11/30/2023: Admit for pain control in the setting of bilateral renal stones, largest 6mm with mild left sided hydronephrosis. Oxy PRN, IVF @ 150 cc/hr, daily flomax.   12/01/2023: HD #2. Pain controlled, did not require narcotic pain medications overnight. Minimal grittiness in urine noticed with straining. Will plan for discharge home with close followup with primary OBGYN, patient to continue straining urine at home.

## 2023-11-30 NOTE — ASSESSMENT & PLAN NOTE
- Has been followed by cardiology   - Recent EKG showed sinus tachycardia  - Holter monitor: 3 symptomatic periods of sinus tachycardia with no arrhythmia  - Echocardiogram normal  - Continue propranolol

## 2023-11-30 NOTE — ASSESSMENT & PLAN NOTE
- Patient presented to the [facility] for concerns of 9/10 back and flank pain, similar to prior episodes of kidney stones  - CBC: 12/11/34/211  - Cr 0.8, AST/ALT 12/8  - UA: trace blood, otherwise wnl   - Renal US: bilateral renal stones, largest 6mm with mild left hydronephrosis  - Patient received 1mg dilaudid IV x2 with some improvement in pain, however, pain has returned severe     Plan:   - Admit to antepartum for pain control and monitoring in the setting of renal stones   - Oxy PRN for pain. If pain is not improved with PO medications nursing to call for administration of dilaudid   - Daily flomax  - IVF @ 150 mL/hr   - Strain urine to monitor for passage of stones   - Will not consult urology at this time as stones are small and likely to pass spontaneously with conservative management. If patient develops worsening symptoms will consider urology consult

## 2023-11-30 NOTE — TELEPHONE ENCOUNTER
Contacted patient Gypsy Rivera today, 11/30/2023, at approximately 11:16 AM. Two patient identifiers confirmed.   Discussed pain. Does not think contractions. Mainly on left side and wraps around. No VB. Endorsing FM. Nausea, emesis last night. History of kidney stones. Going to [facility] OB ED now. Will contact faculty to update.

## 2023-11-30 NOTE — ASSESSMENT & PLAN NOTE
- Primary OBGYN: Dr. Oscar  - Growth US: 1691g/41% @ 30w4d   - Passed glucola   - Tdap, flu when appropriate   - PNV

## 2023-11-30 NOTE — ASSESSMENT & PLAN NOTE
- Patient has a history of anxiety, ADHD, and panic attacks on multiple medications prior to pregnancy and in the first trimester   - Medications included: trazodone, klonopin, vyvanse, adderall, cymbalta, propranolol   - Current medications: adderall, cymbalta, propranolol  - Will continue cymbalta and propranolol while inpatient  - Serial growth ultrasounds performed, most recent growth 1691g/41% @ 30w4d   - Monitor mood/symptoms closely

## 2023-11-30 NOTE — H&P
[facility] Ante[facility]  Obstetrics  History & Physical    Patient Name: Gypsy Rivera  MRN: [id number]  Admission Date: 2023  Primary Care Provider: Karolyn, Primary Doctor    Subjective:     Principal Problem:Kidney stones    History of Present Illness:  Gypsy Rivera is a 33 y.o.  at 31w0d who presented to the [facility] for concerns of back pain. Patient reports onset of 9/10 left sided low back and flank pain around 8am today. She describes the pain as constant sharp and colicky pain. She had a history of dieny stones and states this pain feels similar. She had an episode of nausea and emesis last night. Denies F/C, dizziness/lightheadedness, abdominal pain, dysuria, urinary frequency, hematuria. Reports possible urinary hesitancy. Last bowel movement yesterday.     Denies contractions, vaginal bleeding, or loss of fluid. Reports normal fetal movement. This IUP is complicated by anxiety, ADHD, tachycardia, back surgery.       OB History    Para Term  AB Living   1 0 0 0 0 0   SAB IAB Ectopic Multiple Live Births   0 0 0 0 0      # Outcome Date GA Lbr Richard/2nd Weight Sex Delivery Anes PTL Lv   1 Current              Past Medical History:   Diagnosis Date    ADD (attention deficit disorder)     Anxiety     Kidney stones      Past Surgical History:   Procedure Laterality Date    BACK SURGERY  2022    COSMETIC SURGERY      rhinoplasty       PTA Medications   Medication Sig    cetirizine (ZYRTEC) 10 MG tablet Take 10 mg by mouth once daily.    clonazePAM (KLONOPIN) 0.5 MG tablet Take 0.5 mg by mouth 2 (two) times daily as needed for Anxiety.    dextroamphetamine-amphetamine (ADDERALL XR) 30 MG 24 hr capsule Take by mouth every morning.    diclofenac sodium (VOLTAREN) 1 % Gel Apply 2 g topically daily as needed. Apply to affected area PRN (Patient not taking: Reported on 2023)    diphenhydramine HCl (UNISOM, DIPHENHYDRAMINE, ORAL) Take by mouth once daily. Once nightly.    DULoxetine  (CYMBALTA) 60 MG capsule Take 120 mg by mouth.    metoclopramide HCl (REGLAN) 5 MG tablet Take 1 tablet (5 mg total) by mouth 2 (two) times daily as needed (nausea, vomiting). (Patient not taking: Reported on 9/7/2023)    prenatal 25/iron fum/folic/dha (PRENATAL-1 ORAL) Take by mouth.    propranolol (INDERAL) 10 MG tablet Take 10 mg by mouth daily as needed.    pyridoxine HCl, vitamin B6, (VITAMIN B-6 ORAL) Take by mouth once daily. Once daily in the morning.    traZODone (DESYREL) 50 MG tablet Take 50 mg by mouth every evening.    VYVANSE 70 mg capsule Take 70 mg by mouth every morning.       Review of patient's allergies indicates:  No Known Allergies     Family History       Problem Relation (Age of Onset)    Cancer Maternal Grandfather    Eczema Sister    Heart disease Mother          Tobacco Use    Smoking status: Never    Smokeless tobacco: Never   Substance and Sexual Activity    Alcohol use: Not Currently     Comment: occasional    Drug use: No    Sexual activity: Yes     Partners: Male     Review of Systems   Constitutional:  Negative for chills and fever.   HENT:  Negative for nasal congestion.    Eyes:  Negative for visual disturbance.   Respiratory:  Negative for shortness of breath.    Cardiovascular:  Negative for chest pain.   Gastrointestinal:  Positive for abdominal pain, nausea and vomiting. Negative for constipation.   Endocrine: Negative for diabetes.   Genitourinary:  Negative for dysuria, frequency, vaginal bleeding and vaginal discharge.   Musculoskeletal:  Positive for back pain.   Neurological:  Negative for headaches.   Hematological:  Does not bruise/bleed easily.      Objective:     Vital Signs (Most Recent):  Temp: 97.7 °F (36.5 °C) (11/30/23 1145)  Pulse: 92 (11/30/23 1400)  Resp: 19 (11/30/23 1358)  BP: (!) 137/94 (11/30/23 1400)  SpO2: 98 % (11/30/23 1305) Vital Signs (24h Range):  Temp:  [97.7 °F (36.5 °C)] 97.7 °F (36.5 °C)  Pulse:  [] 92  Resp:  [18-20] 19  SpO2:  [96 %-98 %]  98 %  BP: (122-137)/(84-94) 137/94        There is no height or weight on file to calculate BMI.    FHT: 135, modBTBV, +accels, -decels, reactive and reassuring, appropriate for gestational age   TOCO:  Irregular      Physical Exam:   Constitutional: She is oriented to person, place, and time. She appears well-developed and well-nourished.    HENT:   Head: Normocephalic and atraumatic.    Eyes: EOM are normal.     Cardiovascular:  Normal rate.             Pulmonary/Chest: Effort normal.        Abdominal: Soft. There is no abdominal tenderness.             Musculoskeletal: Normal range of motion.      Comments: Minimal tenderness to palpation across back. No CVA tenderness.       Neurological: She is alert and oriented to person, place, and time.    Skin: Skin is warm and dry.    Psychiatric: She has a normal mood and affect. Her behavior is normal.        Cervix:  Dilation:  0  Effacement:  50%  Station: -3  Presentation: Vertex     Significant Labs:  Recent Results (from the past 24 hour(s))   POCT urinalysis, dipstick or tablet reag    Collection Time: 11/30/23 11:48 AM   Result Value Ref Range    Color, UA Dark Yellow     Spec Grav UA 1.000     pH, UA 6     WBC, UA neg     Nitrite, UA neg     Protein, POC trace     Glucose, UA neg     Ketones, UA neg     Urobilinogen, UA norm     Bilirubin, POC neg     Blood, UA trace    Protein / creatinine ratio, urine    Collection Time: 11/30/23 12:06 PM   Result Value Ref Range    Protein, Urine Random 23 (H) 0 - 15 mg/dL    Creatinine, Urine 152.0 15.0 - 325.0 mg/dL    Prot/Creat Ratio, Urine 0.15 0.00 - 0.20   Comprehensive metabolic panel    Collection Time: 11/30/23 12:20 PM   Result Value Ref Range    Sodium 136 136 - 145 mmol/L    Potassium 4.1 3.5 - 5.1 mmol/L    Chloride 106 95 - 110 mmol/L    CO2 20 (L) 23 - 29 mmol/L    Glucose 87 70 - 110 mg/dL    BUN 7 6 - 20 mg/dL    Creatinine 0.8 0.5 - 1.4 mg/dL    Calcium 8.8 8.7 - 10.5 mg/dL    Total Protein 7.0 6.0 - 8.4  g/dL    Albumin 2.8 (L) 3.5 - 5.2 g/dL    Total Bilirubin 0.5 0.1 - 1.0 mg/dL    Alkaline Phosphatase 96 55 - 135 U/L    AST 12 10 - 40 U/L    ALT 8 (L) 10 - 44 U/L    eGFR >60 >60 mL/min/1.73 m^2    Anion Gap 10 8 - 16 mmol/L   CBC auto differential    Collection Time: 23 12:20 PM   Result Value Ref Range    WBC 11.94 3.90 - 12.70 K/uL    RBC 3.70 (L) 4.00 - 5.40 M/uL    Hemoglobin 11.4 (L) 12.0 - 16.0 g/dL    Hematocrit 33.8 (L) 37.0 - 48.5 %    MCV 91 82 - 98 fL    MCH 30.8 27.0 - 31.0 pg    MCHC 33.7 32.0 - 36.0 g/dL    RDW 13.0 11.5 - 14.5 %    Platelets 211 150 - 450 K/uL    MPV 11.7 9.2 - 12.9 fL    Immature Granulocytes 1.8 (H) 0.0 - 0.5 %    Gran # (ANC) 9.1 (H) 1.8 - 7.7 K/uL    Immature Grans (Abs) 0.22 (H) 0.00 - 0.04 K/uL    Lymph # 1.8 1.0 - 4.8 K/uL    Mono # 0.8 0.3 - 1.0 K/uL    Eos # 0.0 0.0 - 0.5 K/uL    Baso # 0.03 0.00 - 0.20 K/uL    nRBC 0 0 /100 WBC    Gran % 76.2 (H) 38.0 - 73.0 %    Lymph % 14.7 (L) 18.0 - 48.0 %    Mono % 6.7 4.0 - 15.0 %    Eosinophil % 0.3 0.0 - 8.0 %    Basophil % 0.3 0.0 - 1.9 %    Differential Method Automated        Assessment/Plan:     33 y.o. female  at 31w0d for:    * Kidney stones  - Patient presented to the [facility] for concerns of 9/10 back and flank pain, similar to prior episodes of kidney stones  - CBC: 34/211  - Cr 0.8, AST/ALT   - UA: trace blood, otherwise wnl   - Renal US: bilateral renal stones, largest 6mm with mild left hydronephrosis  - Patient received 1mg dilaudid IV x2 with some improvement in pain, however, pain has returned severe     Plan:   - Admit to antepartum for pain control and monitoring in the setting of renal stones   - Oxy PRN for pain. If pain is not improved with PO medications nursing to call for administration of dilaudid   - Daily flomax  - IVF @ 150 mL/hr   - Strain urine to monitor for passage of stones   - Will not consult urology at this time as stones are small and likely to pass spontaneously with  conservative management. If patient develops worsening symptoms will consider urology consult       31 weeks gestation of pregnancy  - Primary OBGYN: Dr. Oscar  - Growth US: 1691g/41% @ 30w4d   - Passed glucola   - Tdap, flu when appropriate   - PNV      History of back surgery  - L4/5 discectomy and laminectomy 1 year ago  - Anesthesia to evaluate patient     ADHD  - See medication exposure in pregnancy     Anxiety  - Continue home medications   - See medication exposure in pregnancy     Tachycardia  - Has been followed by cardiology   - Recent EKG showed sinus tachycardia  - Holter monitor: 3 symptomatic periods of sinus tachycardia with no arrhythmia  - Echocardiogram normal  - Continue propranolol    Medication exposure during first trimester of pregnancy  - Patient has a history of anxiety, ADHD, and panic attacks on multiple medications prior to pregnancy and in the first trimester   - Medications included: trazodone, klonopin, vyvanse, adderall, cymbalta, propranolol   - Current medications: adderall, cymbalta, propranolol  - Will continue cymbalta and propranolol while inpatient  - Serial growth ultrasounds performed, most recent growth 1691g/41% @ 30w4d   - Monitor mood/symptoms closely         Ana M Madison MD  Obstetrics  [facility] - [facility]te[facility]  ______________________________________  Fellow Attestation    I have reviewed and concur with the resident's history, physical, assessment, and plan. I have personally interviewed and examined the patient at bedside.   See addendum bellow for my evaluation and additional findings:    Now 31w0d admitted for the following:  Hospital Day: 1    Nephrolithiasis: hx stones. Pain is similar. No obvious obstruction on US or renal dysfunction on labs. PRN PO narcotics, daily flomax, continue PO and IV hydration. Other labs wnl. Mild range BP in OB ED due to pain. Can consider short course of NSAIDs if pain is uncontrolled, however will refrain at this time. No s/s  infection, PTL, or other pathology.   Remainder of plan as outlined above    NST: 125/mod/+acc/-dec. Irreg contractions    RUBENS Hernandez MD  Maternal Fetal Medicine Fellow   PGY-5

## 2023-11-30 NOTE — SUBJECTIVE & OBJECTIVE
OB History    Para Term  AB Living   1 0 0 0 0 0   SAB IAB Ectopic Multiple Live Births   0 0 0 0 0      # Outcome Date GA Lbr Richard/2nd Weight Sex Delivery Anes PTL Lv   1 Current              Past Medical History:   Diagnosis Date    ADD (attention deficit disorder)     Anxiety     Kidney stones      Past Surgical History:   Procedure Laterality Date    BACK SURGERY  2022    COSMETIC SURGERY      rhinoplasty       PTA Medications   Medication Sig    cetirizine (ZYRTEC) 10 MG tablet Take 10 mg by mouth once daily.    clonazePAM (KLONOPIN) 0.5 MG tablet Take 0.5 mg by mouth 2 (two) times daily as needed for Anxiety.    dextroamphetamine-amphetamine (ADDERALL XR) 30 MG 24 hr capsule Take by mouth every morning.    diclofenac sodium (VOLTAREN) 1 % Gel Apply 2 g topically daily as needed. Apply to affected area PRN (Patient not taking: Reported on 2023)    diphenhydramine HCl (UNISOM, DIPHENHYDRAMINE, ORAL) Take by mouth once daily. Once nightly.    DULoxetine (CYMBALTA) 60 MG capsule Take 120 mg by mouth.    metoclopramide HCl (REGLAN) 5 MG tablet Take 1 tablet (5 mg total) by mouth 2 (two) times daily as needed (nausea, vomiting). (Patient not taking: Reported on 2023)    prenatal 25/iron fum/folic/dha (PRENATAL-1 ORAL) Take by mouth.    propranolol (INDERAL) 10 MG tablet Take 10 mg by mouth daily as needed.    pyridoxine HCl, vitamin B6, (VITAMIN B-6 ORAL) Take by mouth once daily. Once daily in the morning.    traZODone (DESYREL) 50 MG tablet Take 50 mg by mouth every evening.    VYVANSE 70 mg capsule Take 70 mg by mouth every morning.       Review of patient's allergies indicates:  No Known Allergies     Family History       Problem Relation (Age of Onset)    Cancer Maternal Grandfather    Eczema Sister    Heart disease Mother          Tobacco Use    Smoking status: Never    Smokeless tobacco: Never   Substance and Sexual Activity    Alcohol use: Not Currently     Comment: occasional     Drug use: No    Sexual activity: Yes     Partners: Male     Review of Systems   Constitutional:  Negative for chills and fever.   HENT:  Negative for nasal congestion.    Eyes:  Negative for visual disturbance.   Respiratory:  Negative for shortness of breath.    Cardiovascular:  Negative for chest pain.   Gastrointestinal:  Positive for abdominal pain, nausea and vomiting. Negative for constipation.   Endocrine: Negative for diabetes.   Genitourinary:  Negative for dysuria, frequency, vaginal bleeding and vaginal discharge.   Musculoskeletal:  Positive for back pain.   Neurological:  Negative for headaches.   Hematological:  Does not bruise/bleed easily.      Objective:     Vital Signs (Most Recent):  Temp: 97.7 °F (36.5 °C) (11/30/23 1145)  Pulse: 92 (11/30/23 1400)  Resp: 19 (11/30/23 1358)  BP: (!) 137/94 (11/30/23 1400)  SpO2: 98 % (11/30/23 1305) Vital Signs (24h Range):  Temp:  [97.7 °F (36.5 °C)] 97.7 °F (36.5 °C)  Pulse:  [] 92  Resp:  [18-20] 19  SpO2:  [96 %-98 %] 98 %  BP: (122-137)/(84-94) 137/94        There is no height or weight on file to calculate BMI.    FHT: 135, modBTBV, +accels, -decels, reactive and reassuring, appropriate for gestational age   TOCO:  Irregular      Physical Exam:   Constitutional: She is oriented to person, place, and time. She appears well-developed and well-nourished.    HENT:   Head: Normocephalic and atraumatic.    Eyes: EOM are normal.     Cardiovascular:  Normal rate.             Pulmonary/Chest: Effort normal.        Abdominal: Soft. There is no abdominal tenderness.             Musculoskeletal: Normal range of motion.      Comments: Minimal tenderness to palpation across back. No CVA tenderness.       Neurological: She is alert and oriented to person, place, and time.    Skin: Skin is warm and dry.    Psychiatric: She has a normal mood and affect. Her behavior is normal.        Cervix:  Dilation:  0  Effacement:  50%  Station: -3  Presentation: Vertex      Significant Labs:  Recent Results (from the past 24 hour(s))   POCT urinalysis, dipstick or tablet reag    Collection Time: 11/30/23 11:48 AM   Result Value Ref Range    Color, UA Dark Yellow     Spec Grav UA 1.000     pH, UA 6     WBC, UA neg     Nitrite, UA neg     Protein, POC trace     Glucose, UA neg     Ketones, UA neg     Urobilinogen, UA norm     Bilirubin, POC neg     Blood, UA trace    Protein / creatinine ratio, urine    Collection Time: 11/30/23 12:06 PM   Result Value Ref Range    Protein, Urine Random 23 (H) 0 - 15 mg/dL    Creatinine, Urine 152.0 15.0 - 325.0 mg/dL    Prot/Creat Ratio, Urine 0.15 0.00 - 0.20   Comprehensive metabolic panel    Collection Time: 11/30/23 12:20 PM   Result Value Ref Range    Sodium 136 136 - 145 mmol/L    Potassium 4.1 3.5 - 5.1 mmol/L    Chloride 106 95 - 110 mmol/L    CO2 20 (L) 23 - 29 mmol/L    Glucose 87 70 - 110 mg/dL    BUN 7 6 - 20 mg/dL    Creatinine 0.8 0.5 - 1.4 mg/dL    Calcium 8.8 8.7 - 10.5 mg/dL    Total Protein 7.0 6.0 - 8.4 g/dL    Albumin 2.8 (L) 3.5 - 5.2 g/dL    Total Bilirubin 0.5 0.1 - 1.0 mg/dL    Alkaline Phosphatase 96 55 - 135 U/L    AST 12 10 - 40 U/L    ALT 8 (L) 10 - 44 U/L    eGFR >60 >60 mL/min/1.73 m^2    Anion Gap 10 8 - 16 mmol/L   CBC auto differential    Collection Time: 11/30/23 12:20 PM   Result Value Ref Range    WBC 11.94 3.90 - 12.70 K/uL    RBC 3.70 (L) 4.00 - 5.40 M/uL    Hemoglobin 11.4 (L) 12.0 - 16.0 g/dL    Hematocrit 33.8 (L) 37.0 - 48.5 %    MCV 91 82 - 98 fL    MCH 30.8 27.0 - 31.0 pg    MCHC 33.7 32.0 - 36.0 g/dL    RDW 13.0 11.5 - 14.5 %    Platelets 211 150 - 450 K/uL    MPV 11.7 9.2 - 12.9 fL    Immature Granulocytes 1.8 (H) 0.0 - 0.5 %    Gran # (ANC) 9.1 (H) 1.8 - 7.7 K/uL    Immature Grans (Abs) 0.22 (H) 0.00 - 0.04 K/uL    Lymph # 1.8 1.0 - 4.8 K/uL    Mono # 0.8 0.3 - 1.0 K/uL    Eos # 0.0 0.0 - 0.5 K/uL    Baso # 0.03 0.00 - 0.20 K/uL    nRBC 0 0 /100 WBC    Gran % 76.2 (H) 38.0 - 73.0 %    Lymph % 14.7 (L)  18.0 - 48.0 %    Mono % 6.7 4.0 - 15.0 %    Eosinophil % 0.3 0.0 - 8.0 %    Basophil % 0.3 0.0 - 1.9 %    Differential Method Automated

## 2023-11-30 NOTE — ED PROVIDER NOTES
Encounter Date: 2023       History     Chief Complaint   Patient presents with    Back Pain     Gypsy Rivera is a 33 y.o. [id number] at 31w0d presents complaining of back pain. She states it began around 8 or 9 AM this morning. She states it is left sided lower back pain that wraps around to her L flank/abdomen. She rates the pain 9/10 and states it is constant. She has a history of kidney stones and pyelonephritis and states the pain feels like kidney stones. She endorses some nausea, with one episode of vomiting last night. She denies dysuria but endorses urinary frequency and some urinary hesitancy. She states her last BM< was 2 days ago and this is her baseline.     This IUP is complicated by medication exposure in 1T & tachycardia.  Patient denies contractions, denies vaginal bleeding, denies LOF.   Fetal Movement: normal     The history is provided by the patient. No  was used.     Review of patient's allergies indicates:  No Known Allergies  Past Medical History:   Diagnosis Date    ADD (attention deficit disorder)     Anxiety     Kidney stones      Past Surgical History:   Procedure Laterality Date    BACK SURGERY  2022    COSMETIC SURGERY      rhinoplasty     Family History   Problem Relation Age of Onset    Heart disease Mother     Cancer Maternal Grandfather         Prostate     Eczema Sister     Melanoma Neg Hx     Psoriasis Neg Hx     Lupus Neg Hx      Social History     Tobacco Use    Smoking status: Never    Smokeless tobacco: Never   Substance Use Topics    Alcohol use: Not Currently     Comment: occasional    Drug use: No     Review of Systems   Constitutional:  Negative for chills, fatigue and fever.   HENT:  Negative for congestion.    Eyes:  Negative for visual disturbance.   Respiratory:  Negative for shortness of breath.    Cardiovascular:  Negative for chest pain and leg swelling.   Gastrointestinal:  Positive for constipation and nausea. Negative for diarrhea  and vomiting.   Genitourinary:  Positive for difficulty urinating, flank pain and frequency. Negative for dysuria, vaginal bleeding and vaginal discharge.   Musculoskeletal:  Positive for back pain. Negative for arthralgias and joint swelling.   Skin:  Negative for rash.   Neurological:  Negative for weakness and headaches.   Psychiatric/Behavioral:  Negative for confusion and sleep disturbance.        Physical Exam     Initial Vitals [11/30/23 1145]   BP Pulse Resp Temp SpO2   (!) 122/93 103 18 97.7 °F (36.5 °C) 97 %      MAP       --       Temp:  [97.7 °F (36.5 °C)] 97.7 °F (36.5 °C)  Pulse:  [] 92  Resp:  [18-20] 19  SpO2:  [96 %-98 %] 98 %  BP: (122-137)/(84-94) 137/94    Physical Exam    Vitals reviewed.  Constitutional: She appears well-developed and well-nourished.   HENT:   Head: Normocephalic.   Eyes: EOM are normal.   Neck:   Normal range of motion.  Cardiovascular:  Normal rate.           Pulmonary/Chest: No respiratory distress.   Normal work of breathing    Abdominal:   Gravid Abdomen   No right CVA tenderness.  No left CVA tenderness. There is no rebound and no guarding.   Musculoskeletal:         General: Normal range of motion.      Cervical back: Normal range of motion. No tenderness.      Thoracic back: No tenderness.      Lumbar back: No tenderness.     Neurological: She is alert and oriented to person, place, and time.   Skin: Skin is warm and dry.   Psychiatric: She has a normal mood and affect.     OB LABOR EXAM:       Method: Sterile vaginal exam per MD.       Dilatation: 0.   Station: -4.   Effacement: 40%.             ED Course   Fetal non-stress test    Date/Time: 11/30/2023 3:06 PM    Performed by: Viviana Mercer MD  Authorized by: Carlos Morataya MD    Nonstress Test:     Variability:  6-25 BPM    Decelerations:  None    Accelerations:  15 bpm    Baseline:  135    Contractions:  Irregular    Contraction Frequency:  Rare  Biophysical Profile:     Nonstress Test Interpretation:  reactive      Overall Impression:  Reassuring  Post-procedure:     Patient tolerance:  Patient tolerated the procedure well with no immediate complications    Labs Reviewed   COMPREHENSIVE METABOLIC PANEL - Abnormal; Notable for the following components:       Result Value    CO2 20 (*)     Albumin 2.8 (*)     ALT 8 (*)     All other components within normal limits   CBC W/ AUTO DIFFERENTIAL - Abnormal; Notable for the following components:    RBC 3.70 (*)     Hemoglobin 11.4 (*)     Hematocrit 33.8 (*)     Immature Granulocytes 1.8 (*)     Gran # (ANC) 9.1 (*)     Immature Grans (Abs) 0.22 (*)     Gran % 76.2 (*)     Lymph % 14.7 (*)     All other components within normal limits   PROTEIN / CREATININE RATIO, URINE - Abnormal; Notable for the following components:    Protein, Urine Random 23 (*)     All other components within normal limits    Narrative:     Specimen Source->Urine   POCT URINALYSIS, DIPSTICK OR TABLET REAGENT, AUTOMATED, WITH MICROSCOP          Imaging Results              US Kidney (Final result)  Result time 11/30/23 13:54:24      Final result by Andrea Reyes MD (11/30/23 13:54:24)                   Impression:      Newly developed mild left-sided hydronephrosis.  Both ureteral jets were not demonstrated during the study.    Bilateral nephrolithiasis.      Electronically signed by: Andrea Reyes MD  Date:    11/30/2023  Time:    13:54               Narrative:    EXAMINATION:  US KIDNEY    CLINICAL HISTORY:  L flank pain;    TECHNIQUE:  Ultrasound of the kidneys was performed including color flow and Doppler evaluation of the kidneys.    COMPARISON:  CT renal stone study 05/01/2021, renal ultrasound 04/29/2021    FINDINGS:  Right kidney: The right kidney measures 9.6 cm. No cortical thinning. No loss of corticomedullary distinction. Resistive index measures 0.66.  No mass. Multiple scattered echogenic foci with twinkle artifact suggesting small calculi with the largest measuring up to 6 mm.  No  hydronephrosis.    Left kidney: The left kidney measures 11 cm. No cortical thinning. No loss of corticomedullary distinction. Resistive index measures 0.62.  No mass. Multiple scattered echogenic foci with twinkle artifact suggesting small calculi with the largest measuring up to 6 mm.  Suspected mild hydronephrosis, new from 2021 CT study.    Urinary bladder is mildly distended.  Both ureteral jets were not observed during the study.                                       Medications   cetirizine tablet 10 mg (has no administration in time range)   DULoxetine DR capsule 120 mg (has no administration in time range)   propranoloL tablet 10 mg (has no administration in time range)   sodium chloride 0.9% flush 10 mL (has no administration in time range)   acetaminophen tablet 650 mg (has no administration in time range)   ondansetron disintegrating tablet 8 mg (has no administration in time range)   prochlorperazine injection Soln 5 mg (has no administration in time range)   senna-docusate 8.6-50 mg per tablet 1 tablet (has no administration in time range)   simethicone chewable tablet 80 mg (has no administration in time range)   diphenhydrAMINE capsule 25 mg (has no administration in time range)   diphenhydrAMINE injection 25 mg (has no administration in time range)   prenatal vitamin oral tablet (has no administration in time range)   lactated ringers bolus 1,000 mL (0 mLs Intravenous Stopped 23 1310)   HYDROmorphone injection 1 mg (1 mg Intravenous Given 23 1225)   tamsulosin 24 hr capsule 0.4 mg (0.4 mg Oral Given 23 1303)   HYDROmorphone injection 1 mg (1 mg Intravenous Given 23 1358)   lactated ringers bolus 1,000 mL (1,000 mLs Intravenous New Bag 23 1357)     Medical Decision Making  Gypsy Rivera is a 33 y.o. [id number] at 31w0d presents complaining of back and flank pain.    - VSS, two mild range BP readings in ERIK, 120s/90s  - PE as above, no CVA tenderness, no lumbar  tenderness  - NST: 135 bpm, mod BTBV, + accels, - decels, R&R  - TOCO: some uterine irritability & irregular CTX   - SVE: cl/th/hi  - Renal US ordered- bilateral renal calculi up to 6mm, mild hydro on left  - Udip: trace protein, 1+ blood  - PreE labs show plts 211, Cr 0.8, AST/ALT 12/8, P:C 0.15  - 1L LR bolus given  - 1mg dilaudid given for pain x 2  - Flomax given  - Given requirement for multiple doses of IV pain medication in less than 2 hrs and bilateral renal stones with left hydronephrosis, will admit to antepartum for pain mgmt, IV hydration, possible nephro consult if stone does not pass    Amount and/or Complexity of Data Reviewed  Labs: ordered. Decision-making details documented in ED Course.  Radiology: ordered.    Risk  Prescription drug management.  Decision regarding hospitalization.              Attending Attestation:   Physician Attestation Statement for Resident:  As the supervising MD   Physician Attestation Statement: I have personally seen and examined this patient.   I agree with the above history.  -:   As the supervising MD I agree with the above PE.     As the supervising MD I agree with the above treatment, course, plan, and disposition.   -: I agree with the above edited resident note. Pt seen and examined, chart and labs reviewed.    Briefly, 34 yo G1 at 31w0d presenting for left flank pain. Vitals significant for multiple mild range DBPs, likely 2/2 pain. PreE labs sent and unremarkable. IV fluid bolus, flomax and IV dilaudid given. Renal sono shows bilateral renal calculi up to 6mm on each side, with new mild left sided hydronephrosis as compared to prior CT 2021. Pt required two doses of IV dilaudid while in ERIK for uncontrolled pain. Given poor PO tolerance, severe pain, and mild hydronephrosis, discussed admission to antepartum service for pain mgmt, hydration, potential nephrology consultation if stone does not pass.     All questions answered. Admit to [facility] for further mgmt.      Viviana Mercer MD  OB Hospitalist  11/30/2023     I was personally present during the critical portions of the procedure(s) performed by the resident and was immediately available in the ED to provide services and assistance as needed during the entire procedure.  I have reviewed and agree with the residents interpretation of the following: lab data.  I have reviewed the following: old records at this facility.                ED Course as of 11/30/23 1506   Thu Nov 30, 2023   1234 BP(!): 122/93 [AW]   1234 Temp: 97.7 °F (36.5 °C) [AW]   1234 Pulse: 103 [AW]   1234 Resp: 18 [AW]   1234 SpO2: 97 % [AW]   1234 POCT urinalysis, dipstick or tablet reag [AW]   1234 Blood, UA: trace [AW]   1234 Protein, POC: trace [AW]   1234 CBC auto differential(!) [AW]   1235 Hemoglobin(!): 11.4 [AW]   1235 Hematocrit(!): 33.8 [AW]   1235 Platelet Count: 211 [AW]   1235 WBC: 11.94 [AW]      ED Course User Index  [AW] Roselia Lincoln MD                           Clinical Impression:  Final diagnoses:  [M54.9] Back pain, unspecified back location, unspecified back pain laterality, unspecified chronicity  [R11.2] Nausea and vomiting, unspecified vomiting type  [O26.899, R10.9] Flank pain in pregnant patient (Primary)  [Z3A.31] 31 weeks gestation of pregnancy          ED Disposition Condition    Admit Stable                Viviana Mercer MD  11/30/23 1246

## 2023-11-30 NOTE — HPI
Gypsy Rivera is a 33 y.o.  at 31w0d who presented to the ERIK for concerns of back pain. Patient reports onset of 9/10 left sided low back and flank pain around 8am today. She describes the pain as constant sharp and colicky pain. She had a history of dieny stones and states this pain feels similar. She had an episode of nausea and emesis last night. Denies F/C, dizziness/lightheadedness, abdominal pain, dysuria, urinary frequency, hematuria. Reports possible urinary hesitancy. Last bowel movement yesterday.     Denies contractions, vaginal bleeding, or loss of fluid. Reports normal fetal movement. This IUP is complicated by anxiety, ADHD, tachycardia, back surgery.

## 2023-12-01 VITALS
SYSTOLIC BLOOD PRESSURE: 108 MMHG | DIASTOLIC BLOOD PRESSURE: 67 MMHG | TEMPERATURE: 98 F | RESPIRATION RATE: 15 BRPM | OXYGEN SATURATION: 97 % | HEART RATE: 101 BPM

## 2023-12-01 PROCEDURE — 99235 HOSP IP/OBS SAME DATE MOD 70: CPT | Mod: ,,, | Performed by: OBSTETRICS & GYNECOLOGY

## 2023-12-01 PROCEDURE — 96361 HYDRATE IV INFUSION ADD-ON: CPT

## 2023-12-01 PROCEDURE — 25000003 PHARM REV CODE 250

## 2023-12-01 PROCEDURE — G0378 HOSPITAL OBSERVATION PER HR: HCPCS

## 2023-12-01 PROCEDURE — 99235 PR OBSERV/HOSP SAME DATE,LEVL IV: ICD-10-PCS | Mod: ,,, | Performed by: OBSTETRICS & GYNECOLOGY

## 2023-12-01 PROCEDURE — 87081 CULTURE SCREEN ONLY: CPT

## 2023-12-01 RX ADMIN — PRENATAL VIT W/ FE FUMARATE-FA TAB 27-0.8 MG 1 TABLET: 27-0.8 TAB at 08:12

## 2023-12-01 RX ADMIN — PROPRANOLOL HYDROCHLORIDE 10 MG: 10 TABLET ORAL at 08:12

## 2023-12-01 RX ADMIN — TAMSULOSIN HYDROCHLORIDE 0.4 MG: 0.4 CAPSULE ORAL at 09:12

## 2023-12-01 RX ADMIN — FAMOTIDINE 20 MG: 20 TABLET ORAL at 08:12

## 2023-12-01 RX ADMIN — DULOXETINE HYDROCHLORIDE 120 MG: 30 CAPSULE, DELAYED RELEASE ORAL at 08:12

## 2023-12-01 RX ADMIN — CETIRIZINE HYDROCHLORIDE 10 MG: 5 TABLET, FILM COATED ORAL at 08:12

## 2023-12-01 NOTE — DISCHARGE INSTRUCTIONS
CALL [facility], [phone], option 1, FOR KIDNEY PAIN, CRAMPING, VAGINAL BLEEDING/LEAKING FLUID, any other urgent questions or concerns.  CALL [phone] FOR AN EMERGENCY!!

## 2023-12-01 NOTE — PLAN OF CARE
Problem:  Fall Injury Risk  Goal: Absence of Fall, Infant Drop and Related Injury  Outcome: Met     Problem: Adult Inpatient Plan of Care  Goal: Plan of Care Review  Outcome: Met  Goal: Patient-Specific Goal (Individualized)  Outcome: Met  Goal: Absence of Hospital-Acquired Illness or Injury  Outcome: Met  Goal: Optimal Comfort and Wellbeing  Outcome: Met  Goal: Readiness for Transition of Care  Outcome: Met     Problem: Pain Acute  Goal: Acceptable Pain Control and Functional Ability  Outcome: Met     Problem: Maternal-Fetal Wellbeing  Goal: Optimal Maternal-Fetal Wellbeing  Outcome: Met

## 2023-12-01 NOTE — PLAN OF CARE
12/01/23 1258   Final Note   Assessment Type Final Discharge Note   Anticipated Discharge Disposition Home   Post-Acute Status   Discharge Delays None known at this time

## 2023-12-01 NOTE — CARE UPDATE
PM NST    145bpm, moderate variability, +accels, -decels  TOCO: No contractions    Reactive and reassuring    Pawan Garrido MD  OBGYN PGY-2

## 2023-12-01 NOTE — DISCHARGE SUMMARY
[facility] - Antepartum  Obstetrics  Discharge Summary      Patient Name: Gypsy Rivera  MRN: [id number]  Admission Date: 2023  Hospital Length of Stay: 0 days  Discharge Date and Time:  2023 10:09 AM  Attending Physician: Carlos Morataya MD   Discharging Provider: Ana M Madison MD   Primary Care Provider: Karolyn, Primary Doctor    HPI: Gypsy Rivera is a 33 y.o.  at 31w0d who presented to the [facility] for concerns of back pain. Patient reports onset of 9/10 left sided low back and flank pain around 8am today. She describes the pain as constant sharp and colicky pain. She had a history of dieny stones and states this pain feels similar. She had an episode of nausea and emesis last night. Denies F/C, dizziness/lightheadedness, abdominal pain, dysuria, urinary frequency, hematuria. Reports possible urinary hesitancy. Last bowel movement yesterday.     Denies contractions, vaginal bleeding, or loss of fluid. Reports normal fetal movement. This IUP is complicated by anxiety, ADHD, tachycardia, back surgery.     FHT: 135, modBTBV, +accels, -decels, Cat 1 (reassuring)  TOCO:  Quiet    * No surgery found *     Hospital Course:   2023: Admit for pain control in the setting of bilateral renal stones, largest 6mm with mild left sided hydronephrosis. Oxy PRN, IVF @ 150 cc/hr, daily flomax.   2023: HD #2. Pain controlled, did not require narcotic pain medications overnight. Minimal grittiness in urine noticed with straining. Will plan for discharge home, patient to continue straining urine at home.          Final Active Diagnoses:    Diagnosis Date Noted POA    PRINCIPAL PROBLEM:  Kidney stones [N20.0] 2013 Yes    Anxiety [F41.9] 2023 Yes    ADHD [F90.9] 2023 Yes    History of back surgery [Z98.890] 2023 Not Applicable    31 weeks gestation of pregnancy [Z3A.31] 2023 Not Applicable    Tachycardia [R00.0] 2023 Yes    Medication exposure during first trimester of  pregnancy [O09.891] 07/11/2023 Not Applicable      Problems Resolved During this Admission:        Significant Diagnostic Studies: Labs: CMP   Recent Labs   Lab 11/30/23  1220      K 4.1      CO2 20*   GLU 87   BUN 7   CREATININE 0.8   CALCIUM 8.8   PROT 7.0   ALBUMIN 2.8*   BILITOT 0.5   ALKPHOS 96   AST 12   ALT 8*   ANIONGAP 10    and CBC   Recent Labs   Lab 11/30/23  1220   WBC 11.94   HGB 11.4*   HCT 33.8*          Immunizations       None            This patient has no babies on file.  Pending Diagnostic Studies:       None            Discharged Condition: good    Disposition: Home or Self Care    Follow Up:   Follow-up Information       Kasey Oscar MD Follow up on 12/4/2023.    Specialty: Obstetrics and Gynecology  Why: Prenatal visit  Contact information:  [address] [place] [address]  [phone]                           Patient Instructions:      Diet Adult Regular     Notify your health care provider if you experience any of the following:   Order Comments: - Heavy vaginal bleeding   - Severe abdominal pain  - Loss of fluid like your water broke  - Decreased fetal movement   - Headache not relieved with Tylenol  - Vision changes  - Chest pain or shortness or breath     Notify your health care provider if you experience any of the following:  increased confusion or weakness     Notify your health care provider if you experience any of the following:  persistent dizziness, light-headedness, or visual disturbances     Notify your health care provider if you experience any of the following:  severe persistent headache     Notify your health care provider if you experience any of the following:  difficulty breathing or increased cough     Notify your health care provider if you experience any of the following:  severe uncontrolled pain     Notify your health care provider if you experience any of the following:  persistent nausea and vomiting or diarrhea      Notify your health care provider if you experience any of the following:  temperature >100.4     Activity as tolerated     Medications:  Current Discharge Medication List        CONTINUE these medications which have NOT CHANGED    Details   cetirizine (ZYRTEC) 10 MG tablet Take 10 mg by mouth once daily.      dextroamphetamine-amphetamine (ADDERALL XR) 30 MG 24 hr capsule Take by mouth every morning.      diphenhydramine HCl (UNISOM, DIPHENHYDRAMINE, ORAL) Take by mouth once daily. Once nightly.      DULoxetine (CYMBALTA) 60 MG capsule Take 120 mg by mouth.      prenatal 25/iron fum/folic/dha (PRENATAL-1 ORAL) Take by mouth.      propranolol (INDERAL) 10 MG tablet Take 10 mg by mouth daily as needed.      pyridoxine HCl, vitamin B6, (VITAMIN B-6 ORAL) Take by mouth once daily. Once daily in the morning.           STOP taking these medications       clonazePAM (KLONOPIN) 0.5 MG tablet Comments:   Reason for Stopping:         diclofenac sodium (VOLTAREN) 1 % Gel Comments:   Reason for Stopping:         metoclopramide HCl (REGLAN) 5 MG tablet Comments:   Reason for Stopping:         traZODone (DESYREL) 50 MG tablet Comments:   Reason for Stopping:         VYVANSE 70 mg capsule Comments:   Reason for Stopping:               Ana M Madison MD  Obstetrics  [facility] - Antepartum

## 2023-12-01 NOTE — PROGRESS NOTES
Maternal Fetal Medicine  Progress Note          Subjective:    Gypsy Rivera is a 33 y.o.  at 31w1d admitted for nephrolithiasis. Please see H&P for details regarding presentation at admission. This pregnancy is complicated by anxiety, ADHD, tachycardia, back surgery.    Interim HPI: Patient reports feeling well overnight. She reports pain has improved significantly, did not require any narcotic pain medication. She has noticed a small amount of grittiness in the urine, however, no definitive stones. No F/C, back pain, dysuria, frequency, hematuria. Denies contractions, vaginal bleeding, loss of fluid. Reports normal fetal movement.     Medical, Surgical, Social, Family, and Obstetric History: reviewed in chart  Current Medications:    cetirizine  10 mg Oral Daily    DULoxetine  120 mg Oral Daily    famotidine  20 mg Oral BID    prenatal vitamin  1 tablet Oral Daily    propranoloL  10 mg Oral BID    tamsulosin  0.4 mg Oral Daily    acetaminophen, diphenhydrAMINE, diphenhydrAMINE, ondansetron, oxyCODONE, oxyCODONE, prochlorperazine, senna-docusate 8.6-50 mg, simethicone, sodium chloride 0.9%   Objective:   BP (!) 106/56   Pulse 87   Temp 97.8 °F (36.6 °C) (Oral)   Resp 16   LMP 2023   SpO2 97%     Focused Physical Examination   General: well developed, no acute distress  Pulmonary: respiratory effort normal with no retractions  Abdomen: gravid  Cervix:     Fetal Monitoring - PM NST   EFM: 145 baseline/ moderate variability/+ accels/- decels  Tocometer: quiet    Lab Results  CBC   Recent Labs   Lab 23  1220   WBC 11.94   RBC 3.70*   HGB 11.4*   HCT 33.8*      MCV 91   MCH 30.8   MCHC 33.7    CMP   Recent Labs   Lab 23  1220   CALCIUM 8.8   ALBUMIN 2.8*   PROT 7.0      K 4.1   CO2 20*      BUN 7   CREATININE 0.8   ALKPHOS 96   ALT 8*   AST 12   BILITOT 0.5      UA   Recent Labs   Lab 23  1148   COLORU Dark Yellow   SPECGRAV 1.000   PHUR 6          Assessment:   33 y.o.  at 31w1d admitted for kidney stones    Plan:   Nephrolithiasis   - Admitted to antepartum for pain control and monitoring in the setting of renal stones   - Oxy PRN for pain. Hs not required any narcotic pain medications overnight  - Daily flomax  - IVF @ 150 mL/hr   - Strain urine to monitor for passage of stones    - No stones noted in her urine overnight, has noticed some grittiness  - As pain has improved significantly will discharge patient today. Will send home with strainer so patient can monitor urine at home     Medication exposure during first trimester of pregnancy   - Patient has a history of anxiety, ADHD, and panic attacks on multiple medications prior to pregnancy and in the first trimester         - Medications included: trazodone, klonopin, vyvanse, adderall, cymbalta, propranolol         - Current medications: adderall, cymbalta, propranolol  - Will continue cymbalta and propranolol while inpatient  - Serial growth ultrasounds performed, most recent growth 1691g/41% @ 30w4d   - Monitor mood/symptoms closely     Anxiety  - Continue home medications   - See medication exposure in pregnancy     ADHD  - See medication exposure in pregnancy      Tachycardia   - Has been followed by cardiology   - Recent EKG showed sinus tachycardia  - Holter monitor: 3 symptomatic periods of sinus tachycardia with no arrhythmia  - Echocardiogram normal  - Continue propranolol    31 weeks gestation   - Primary OBGYN: Dr. Oscar  - Growth US: 1691g/41% @ 30w4d   - Passed glucola   - Tdap, flu when appropriate   - PNV      Ana M Madison MD  OBGYN, PGY-3

## 2023-12-04 ENCOUNTER — ROUTINE PRENATAL (OUTPATIENT)
Dept: OBSTETRICS AND GYNECOLOGY | Facility: CLINIC | Age: 33
End: 2023-12-04
Payer: COMMERCIAL

## 2023-12-04 VITALS
DIASTOLIC BLOOD PRESSURE: 88 MMHG | BODY MASS INDEX: 27.09 KG/M2 | WEIGHT: 157.88 LBS | SYSTOLIC BLOOD PRESSURE: 116 MMHG

## 2023-12-04 DIAGNOSIS — Z3A.31 31 WEEKS GESTATION OF PREGNANCY: Primary | ICD-10-CM

## 2023-12-04 DIAGNOSIS — O09.891 MEDICATION EXPOSURE DURING FIRST TRIMESTER OF PREGNANCY: ICD-10-CM

## 2023-12-04 LAB
BACTERIA #/AREA URNS AUTO: ABNORMAL /HPF
BACTERIA SPEC AEROBE CULT: NORMAL
BILIRUB UR QL STRIP: NEGATIVE
CLARITY UR REFRACT.AUTO: CLEAR
COLOR UR AUTO: YELLOW
GLUCOSE UR QL STRIP: NEGATIVE
HGB UR QL STRIP: ABNORMAL
KETONES UR QL STRIP: ABNORMAL
LEUKOCYTE ESTERASE UR QL STRIP: NEGATIVE
MICROSCOPIC COMMENT: ABNORMAL
NITRITE UR QL STRIP: NEGATIVE
PH UR STRIP: 7 [PH] (ref 5–8)
PROT UR QL STRIP: NEGATIVE
RBC #/AREA URNS AUTO: 25 /HPF (ref 0–4)
SP GR UR STRIP: 1.01 (ref 1–1.03)
SQUAMOUS #/AREA URNS AUTO: 0 /HPF
URN SPEC COLLECT METH UR: ABNORMAL
WBC #/AREA URNS AUTO: 3 /HPF (ref 0–5)

## 2023-12-04 PROCEDURE — 99999 PR PBB SHADOW E&M-EST. PATIENT-LVL III: CPT | Mod: PBBFAC,,, | Performed by: STUDENT IN AN ORGANIZED HEALTH CARE EDUCATION/TRAINING PROGRAM

## 2023-12-04 PROCEDURE — 99999 PR PBB SHADOW E&M-EST. PATIENT-LVL III: ICD-10-PCS | Mod: PBBFAC,,, | Performed by: STUDENT IN AN ORGANIZED HEALTH CARE EDUCATION/TRAINING PROGRAM

## 2023-12-04 PROCEDURE — 0502F SUBSEQUENT PRENATAL CARE: CPT | Mod: CPTII,S$GLB,, | Performed by: STUDENT IN AN ORGANIZED HEALTH CARE EDUCATION/TRAINING PROGRAM

## 2023-12-04 PROCEDURE — 81001 URINALYSIS AUTO W/SCOPE: CPT | Performed by: STUDENT IN AN ORGANIZED HEALTH CARE EDUCATION/TRAINING PROGRAM

## 2023-12-04 PROCEDURE — 0502F PR SUBSEQUENT PRENATAL CARE: ICD-10-PCS | Mod: CPTII,S$GLB,, | Performed by: STUDENT IN AN ORGANIZED HEALTH CARE EDUCATION/TRAINING PROGRAM

## 2023-12-04 NOTE — PROGRESS NOTES
at 31w4d   Doing well overall, +FM, -LOF/VB/CTX.   Schedule US with 1/2 visit. Serial GS. PP course, GBS, 3TMS labs reviewed. Denies s/sx pree.   Urine testing for sensation of needing to void. No dysuria or hematuria, or fevers. Nephrolithiasis pain resolved!    Needs OB Anesthesia consult, order in. [facility] OB ED precautions. RTC 2wks or sooner PRN. Presents with SO.    GERD:  TUMS, to trial pepcid.

## 2023-12-05 ENCOUNTER — TELEPHONE (OUTPATIENT)
Dept: OBSTETRICS AND GYNECOLOGY | Facility: CLINIC | Age: 33
End: 2023-12-05
Payer: COMMERCIAL

## 2023-12-05 ENCOUNTER — PATIENT OUTREACH (OUTPATIENT)
Dept: ADMINISTRATIVE | Facility: CLINIC | Age: 33
End: 2023-12-05
Payer: COMMERCIAL

## 2023-12-05 NOTE — PROGRESS NOTES
C3 nurse attempted to contact Gypsy Rivera  for a TCC post hospital discharge follow up call. No answer. The patient does not have a scheduled HOSFU appointment, and the pt does not have an [facility] PCP.

## 2023-12-05 NOTE — TELEPHONE ENCOUNTER
Left message for patient regarding ob consult appt. Patient is scheduled for 12/18 for ob consult.

## 2023-12-06 ENCOUNTER — PATIENT MESSAGE (OUTPATIENT)
Dept: OBSTETRICS AND GYNECOLOGY | Facility: CLINIC | Age: 33
End: 2023-12-06
Payer: COMMERCIAL

## 2023-12-06 ENCOUNTER — PATIENT MESSAGE (OUTPATIENT)
Dept: ADMINISTRATIVE | Facility: CLINIC | Age: 33
End: 2023-12-06
Payer: COMMERCIAL

## 2023-12-06 NOTE — PROGRESS NOTES
C3 nurse spoke with Gypsy Rivera  for a TCC post hospital discharge follow up call. The patient reports does not have a scheduled HOSFU appointment. C3 nurse was unable to schedule HOSFU appointment for Non-[facility] PCP. Patient advised to contact their PCP to schedule a HOSPFU within 5-7 days. No PCP.

## 2023-12-06 NOTE — PROGRESS NOTES
2nd Attempt made to reach patient for TCC call. Left voicemail please call [phone] leave first name, last name, and  for Jono I will return your call.  Message sent via [facility] portal for Post Discharge Attempt.

## 2023-12-07 ENCOUNTER — PATIENT MESSAGE (OUTPATIENT)
Dept: OBSTETRICS AND GYNECOLOGY | Facility: CLINIC | Age: 33
End: 2023-12-07
Payer: COMMERCIAL

## 2023-12-07 ENCOUNTER — PATIENT MESSAGE (OUTPATIENT)
Dept: OTHER | Facility: OTHER | Age: 33
End: 2023-12-07
Payer: COMMERCIAL

## 2023-12-07 RX ORDER — FAMOTIDINE 20 MG/1
20 TABLET, FILM COATED ORAL DAILY
Qty: 60 TABLET | Refills: 1 | Status: SHIPPED | OUTPATIENT
Start: 2023-12-07 | End: 2023-12-28 | Stop reason: SDUPTHER

## 2023-12-18 ENCOUNTER — OFFICE VISIT (OUTPATIENT)
Dept: ANESTHESIOLOGY | Facility: OTHER | Age: 33
End: 2023-12-18
Attending: STUDENT IN AN ORGANIZED HEALTH CARE EDUCATION/TRAINING PROGRAM
Payer: COMMERCIAL

## 2023-12-18 ENCOUNTER — ROUTINE PRENATAL (OUTPATIENT)
Dept: OBSTETRICS AND GYNECOLOGY | Facility: CLINIC | Age: 33
End: 2023-12-18
Payer: COMMERCIAL

## 2023-12-18 VITALS
BODY MASS INDEX: 27.49 KG/M2 | SYSTOLIC BLOOD PRESSURE: 122 MMHG | DIASTOLIC BLOOD PRESSURE: 89 MMHG | WEIGHT: 160.19 LBS

## 2023-12-18 DIAGNOSIS — Z3A.27 27 WEEKS GESTATION OF PREGNANCY: ICD-10-CM

## 2023-12-18 DIAGNOSIS — Z3A.33 33 WEEKS GESTATION OF PREGNANCY: Primary | ICD-10-CM

## 2023-12-18 PROCEDURE — 99999 PR PBB SHADOW E&M-EST. PATIENT-LVL III: CPT | Mod: PBBFAC,,, | Performed by: STUDENT IN AN ORGANIZED HEALTH CARE EDUCATION/TRAINING PROGRAM

## 2023-12-18 PROCEDURE — 0502F SUBSEQUENT PRENATAL CARE: CPT | Mod: CPTII,S$GLB,, | Performed by: STUDENT IN AN ORGANIZED HEALTH CARE EDUCATION/TRAINING PROGRAM

## 2023-12-18 PROCEDURE — 99999 PR PBB SHADOW E&M-EST. PATIENT-LVL III: ICD-10-PCS | Mod: PBBFAC,,, | Performed by: STUDENT IN AN ORGANIZED HEALTH CARE EDUCATION/TRAINING PROGRAM

## 2023-12-18 PROCEDURE — 0502F PR SUBSEQUENT PRENATAL CARE: ICD-10-PCS | Mod: CPTII,S$GLB,, | Performed by: STUDENT IN AN ORGANIZED HEALTH CARE EDUCATION/TRAINING PROGRAM

## 2023-12-18 NOTE — PROGRESS NOTES
at 33w4d   Doing well overall, +FM, -LOF/VB/CTX.   Started vitamin B6 and pepcid - better!  Pt's questions answered. [facility] OB ED precautions. RTC 1-2wks or sooner PRN. Presents with SO.    GERD:  TUMS, pepcid helping.  Scoliosis:  OB Anesthesia consult, today.  Nephrolithiasis:  s/p antepartum admission, passed stone. No dysuria, hematuria. Pain has not recurred.   Medication exposures in pregnancy:  US with 1/2 visit. Serial GS. S/p MFM consult. NICU at delivery.   Sinus Tachycardia:  EKG, holter, ECHO with EF 70%, MFM consult . No changes.

## 2023-12-19 NOTE — CONSULTS
Consults  Outpatient OB Anesthesia Consult      Date and Time: 2023 9:10 AM     Request from: Dr. Oscar     CC requesting physician or midwife:    Reason for Consult: Anesthetic recommendations for delivery    Initial Consult?: Yes    Chief Complaint: Hx of Laminectomy     HPI: Patient is a 33 y.o. year old woman,   at 33w5d who with a mhx of sinus tachycardia and laminectomy.  Regarding her tachycardia, she has dealt with this since her early 20s and is currently stable on propranolol daily.   She reports a hx of laminectomy for lumbar radiculopathy. Op note [found in cared everywhere from 2022] details a left L4 hemilaminectomy with a L4-5 diskectomy. Pt denies any hx of additional back surgeries with hardware.     Past medical history:    Past Medical History:   Diagnosis Date    ADD (attention deficit disorder)     Anxiety     Kidney stones        Past surgical history:    Past Surgical History:   Procedure Laterality Date    BACK SURGERY  2022    COSMETIC SURGERY      rhinoplasty       Family history:    Family History   Problem Relation Age of Onset    Heart disease Mother     Cancer Maternal Grandfather         Prostate     Eczema Sister     Melanoma Neg Hx     Psoriasis Neg Hx     Lupus Neg Hx        Social History:    Social History     Socioeconomic History    Marital status: Single   Occupational History     Employer: [employer]   Tobacco Use    Smoking status: Never    Smokeless tobacco: Never   Substance and Sexual Activity    Alcohol use: Not Currently     Comment: occasional    Drug use: No    Sexual activity: Yes     Partners: Male   Social History Narrative    ** Merged History Encounter **         Graduated from [employer] in international business studies. She has an apartment in [place]. She plans to attend law school at [employer]. Her family lives in [place]. Her mom is practice manager for the Cardiovascular surgeons at [facility]. She is the oldest of saurabh    n to 8  children.        Medication:    Current Outpatient Medications on File Prior to Visit   Medication Sig Dispense Refill    cetirizine (ZYRTEC) 10 MG tablet Take 10 mg by mouth once daily.      dextroamphetamine-amphetamine (ADDERALL XR) 30 MG 24 hr capsule Take by mouth every morning.      diphenhydramine HCl (UNISOM, DIPHENHYDRAMINE, ORAL) Take by mouth once daily. Once nightly.      DULoxetine (CYMBALTA) 60 MG capsule Take 120 mg by mouth.      famotidine (PEPCID) 20 MG tablet Take 1 tablet (20 mg total) by mouth once daily. 60 tablet 1    prenatal 25/iron fum/folic/dha (PRENATAL-1 ORAL) Take by mouth.      propranolol (INDERAL) 10 MG tablet Take 10 mg by mouth daily as needed.      pyridoxine HCl, vitamin B6, (VITAMIN B-6 ORAL) Take by mouth once daily. Once daily in the morning.       No current facility-administered medications on file prior to visit.       Allergies:    Patient has no known allergies.    Family or personal history of anesthetic complications: No    Diagnostic Studies: I have reviewed the following relevant findings as noted below:  CBC:  Lab Results   Component Value Date    WBC 11.94 11/30/2023    HGB 11.4 (L) 11/30/2023    HCT 33.8 (L) 11/30/2023    MCV 91 11/30/2023     11/30/2023      CMP:  Sodium   Date Value Ref Range Status   11/30/2023 136 136 - 145 mmol/L Final     Potassium   Date Value Ref Range Status   11/30/2023 4.1 3.5 - 5.1 mmol/L Final     Chloride   Date Value Ref Range Status   11/30/2023 106 95 - 110 mmol/L Final     CO2   Date Value Ref Range Status   11/30/2023 20 (L) 23 - 29 mmol/L Final     Glucose   Date Value Ref Range Status   11/30/2023 87 70 - 110 mg/dL Final     BUN   Date Value Ref Range Status   11/30/2023 7 6 - 20 mg/dL Final     Creatinine   Date Value Ref Range Status   11/30/2023 0.8 0.5 - 1.4 mg/dL Final     Calcium   Date Value Ref Range Status   11/30/2023 8.8 8.7 - 10.5 mg/dL Final     Total Protein   Date Value Ref Range Status   11/30/2023 7.0  6.0 - 8.4 g/dL Final     Albumin   Date Value Ref Range Status   11/30/2023 2.8 (L) 3.5 - 5.2 g/dL Final     Total Bilirubin   Date Value Ref Range Status   11/30/2023 0.5 0.1 - 1.0 mg/dL Final     Comment:     For infants and newborns, interpretation of results should be based  on gestational age, weight and in agreement with clinical  observations.    Premature Infant recommended reference ranges:  Up to 24 hours.............<8.0 mg/dL  Up to 48 hours............<12.0 mg/dL  3-5 days..................<15.0 mg/dL  6-29 days.................<15.0 mg/dL       Alkaline Phosphatase   Date Value Ref Range Status   11/30/2023 96 55 - 135 U/L Final     AST   Date Value Ref Range Status   11/30/2023 12 10 - 40 U/L Final     ALT   Date Value Ref Range Status   11/30/2023 8 (L) 10 - 44 U/L Final     Anion Gap   Date Value Ref Range Status   11/30/2023 10 8 - 16 mmol/L Final     eGFR if    Date Value Ref Range Status   03/06/2020 >60.0 >60 mL/min/1.73 m^2 Final     eGFR if non    Date Value Ref Range Status   03/06/2020 >60.0 >60 mL/min/1.73 m^2 Final     Comment:     Calculation used to obtain the estimated glomerular filtration  rate (eGFR) is the CKD-EPI equation.        BMP:   Lab Results   Component Value Date     11/30/2023    K 4.1 11/30/2023     11/30/2023    CO2 20 (L) 11/30/2023    BUN 7 11/30/2023    CREATININE 0.8 11/30/2023    CALCIUM 8.8 11/30/2023    ANIONGAP 10 11/30/2023    ESTGFRAFRICA >60.0 03/06/2020    EGFRNONAA >60.0 03/06/2020     Coagulation studies:   Lab Results   Component Value Date    INR 0.9 09/06/2022    APTT 22.5 (L) 09/06/2022     EKG 07/2023: Sinus Tachycardia  Echo 07/2023: EF 70%, normal biventricular function.      Review of Systems:   Constitutional: Negative for fever and chills; well appearing female  Eyes: no visual changes  Ear, nose, mouth and throat: no loose or broken teeth  Cardiovascular: no chest pain  Respiratory: no shortness of  breath  Gastrointestinal: no nausea or vomiting  Genitourinary: no dysuria  Musculoskeletal: no joint pain  Skin: warm and dry, no rashes  Neurologic: no seizures  Psychiatric: no anxiety or depression  Endocrinology: no heat or cold intolerance  Hematologic: does not bruise or bleed easily      Physical Exam:  Vitals:    See OB visit vitals  Constitutional: alert, no distress  Eyes: normal lids, pupils symmetric  Ear, nose, mouth and throat: Mallampati I, normal thyromental distance, normal lips, teeth, gums and tongue   Cardiovascular: normal rate, no murmurs  Respiratory: normal effort, no wheezes  Musculoskeletal: normal gait, normal range of motion. Spinous processes easily palpable. Well healed surgical scar in the lumbar area.  Skin: no rashes, no induration  Neurologic: normal reflexes and sensation  Psychiatric: oriented to person, place, time; normal affect    ASA Score:2    Assessment and Plan:  33 year old  at 33w5d who presents with a hx of laminectomy one year ago and sinus tachycardia.  - L4 Laminectomy: Patient was informed she is a candidate for labor analgesia with an epidural. The team will take care to avoid the L4-5 interspace as this is the site of her surgery. Risks of neuraxial anesthesia reviewed with patient [infection, bleeding, damage to the surrounding structure]. We also discussed alternative options in the event that her epidural does not provide proper analgesia for her pain. Entertained and answered all questions to the patient's satisfaction and patient expressed understanding.  - Sinus Tachycardia: Pt reports she is largely asymptomatic. HR from most recent clinic visits show rates in the low 100s. Recommend telemetry for monitoring during labor. Early epidural encouraged to minimize transient increases in HR      Complexity: Moderate      Priscilla Nicole MD

## 2023-12-28 ENCOUNTER — PATIENT MESSAGE (OUTPATIENT)
Dept: OTHER | Facility: OTHER | Age: 33
End: 2023-12-28
Payer: COMMERCIAL

## 2023-12-28 RX ORDER — FAMOTIDINE 20 MG/1
20 TABLET, FILM COATED ORAL 2 TIMES DAILY
Qty: 60 TABLET | Refills: 1 | Status: SHIPPED | OUTPATIENT
Start: 2023-12-28 | End: 2024-02-23

## 2024-01-02 ENCOUNTER — ROUTINE PRENATAL (OUTPATIENT)
Dept: OBSTETRICS AND GYNECOLOGY | Facility: CLINIC | Age: 34
End: 2024-01-02
Payer: COMMERCIAL

## 2024-01-02 ENCOUNTER — PATIENT MESSAGE (OUTPATIENT)
Dept: OBSTETRICS AND GYNECOLOGY | Facility: CLINIC | Age: 34
End: 2024-01-02

## 2024-01-02 ENCOUNTER — PROCEDURE VISIT (OUTPATIENT)
Dept: OBSTETRICS AND GYNECOLOGY | Facility: CLINIC | Age: 34
End: 2024-01-02
Payer: COMMERCIAL

## 2024-01-02 VITALS
SYSTOLIC BLOOD PRESSURE: 118 MMHG | BODY MASS INDEX: 28.25 KG/M2 | WEIGHT: 164.56 LBS | DIASTOLIC BLOOD PRESSURE: 86 MMHG

## 2024-01-02 DIAGNOSIS — Z3A.35 35 WEEKS GESTATION OF PREGNANCY: Primary | ICD-10-CM

## 2024-01-02 DIAGNOSIS — F90.9 ATTENTION DEFICIT HYPERACTIVITY DISORDER (ADHD), UNSPECIFIED ADHD TYPE: ICD-10-CM

## 2024-01-02 DIAGNOSIS — Z3A.31 31 WEEKS GESTATION OF PREGNANCY: ICD-10-CM

## 2024-01-02 DIAGNOSIS — O09.891 MEDICATION EXPOSURE DURING FIRST TRIMESTER OF PREGNANCY: ICD-10-CM

## 2024-01-02 DIAGNOSIS — F41.9 ANXIETY: ICD-10-CM

## 2024-01-02 PROCEDURE — 99999 PR PBB SHADOW E&M-EST. PATIENT-LVL III: CPT | Mod: PBBFAC,,, | Performed by: STUDENT IN AN ORGANIZED HEALTH CARE EDUCATION/TRAINING PROGRAM

## 2024-01-02 PROCEDURE — 0502F SUBSEQUENT PRENATAL CARE: CPT | Mod: CPTII,S$GLB,, | Performed by: STUDENT IN AN ORGANIZED HEALTH CARE EDUCATION/TRAINING PROGRAM

## 2024-01-02 PROCEDURE — 76816 OB US FOLLOW-UP PER FETUS: CPT | Mod: S$GLB,,, | Performed by: OBSTETRICS & GYNECOLOGY

## 2024-01-02 RX ORDER — DEXTROAMPHETAMINE SACCHARATE, AMPHETAMINE ASPARTATE MONOHYDRATE, DEXTROAMPHETAMINE SULFATE AND AMPHETAMINE SULFATE 7.5; 7.5; 7.5; 7.5 MG/1; MG/1; MG/1; MG/1
30 CAPSULE, EXTENDED RELEASE ORAL EVERY MORNING
Qty: 30 CAPSULE | Refills: 0 | Status: SHIPPED | OUTPATIENT
Start: 2024-01-02

## 2024-01-02 NOTE — PROGRESS NOTES
at 35w5d   Doing well overall, +FM, -VB/CTX.   Increased discharge as of late. No itch, odor, or irritation. Exam below and reassuring.   US today reviewed. Requesting term induction, request placed for  at 39w0d. In need of adderall refill, requesting rx sent here as her pharmacy currently out, sent. Pt's questions answered. [facility] ED precautions. RTC 1wk or sooner PRN. Presents with SO.    PHYSICAL EXAM  ABD:  Soft, gravid.  EXTERNAL GENITALIA: No lesions or masses, non-erythematous.  URETHRA: Patent, no discharge.   VAGINA: Pink, moist, rugated, small amount white/clear discharge.   CERVIX: Nulliparous, no lesions or masses, os closed, no blood or discharge per os, no pooling, negative nitrazine, no glistening, negative cough test.     GERD:  TUMS, pepcid.  Scoliosis:  s/p OB Anesthesia consult.  Nephrolithiasis:  s/p antepartum admission, passed stone.   Medication exposures in pregnancy:  US with 1/2 visit. Serial GS. S/p MFM consult. NICU at delivery.   Sinus Tachycardia:  EKG, holter, ECHO with EF 70%, MFM consult .

## 2024-01-03 ENCOUNTER — TELEPHONE (OUTPATIENT)
Dept: OBSTETRICS AND GYNECOLOGY | Facility: CLINIC | Age: 34
End: 2024-01-03
Payer: COMMERCIAL

## 2024-01-03 DIAGNOSIS — F98.8 ATTENTION DEFICIT DISORDER, UNSPECIFIED HYPERACTIVITY PRESENCE: ICD-10-CM

## 2024-01-03 DIAGNOSIS — N20.0 KIDNEY STONES: ICD-10-CM

## 2024-01-03 DIAGNOSIS — Z87.898 HISTORY OF TACHYCARDIA: ICD-10-CM

## 2024-01-03 DIAGNOSIS — F90.9 ATTENTION DEFICIT HYPERACTIVITY DISORDER (ADHD), UNSPECIFIED ADHD TYPE: ICD-10-CM

## 2024-01-03 DIAGNOSIS — O09.891 MEDICATION EXPOSURE DURING FIRST TRIMESTER OF PREGNANCY: Primary | ICD-10-CM

## 2024-01-03 DIAGNOSIS — F41.9 ANXIETY: ICD-10-CM

## 2024-01-03 NOTE — TELEPHONE ENCOUNTER
----- Message from Kasey Oscar MD sent at 2024 12:48 PM CST -----  Please schedule induction    , 5 AM    32yo  who will be 39w0d with PMH of tachycardia, anxiety, ADD, nephrolithiasis with previous inpatient admission, and s/p MFM consult for multiple medication use in pregnancy.

## 2024-01-04 ENCOUNTER — TELEPHONE (OUTPATIENT)
Dept: OBSTETRICS AND GYNECOLOGY | Facility: OTHER | Age: 34
End: 2024-01-04
Payer: COMMERCIAL

## 2024-01-04 ENCOUNTER — HOSPITAL ENCOUNTER (EMERGENCY)
Facility: OTHER | Age: 34
Discharge: HOME | End: 2024-01-04
Attending: OBSTETRICS & GYNECOLOGY
Payer: COMMERCIAL

## 2024-01-04 VITALS
DIASTOLIC BLOOD PRESSURE: 92 MMHG | OXYGEN SATURATION: 100 % | SYSTOLIC BLOOD PRESSURE: 140 MMHG | HEART RATE: 89 BPM | RESPIRATION RATE: 18 BRPM | TEMPERATURE: 98 F

## 2024-01-04 DIAGNOSIS — Z3A.36 36 WEEKS GESTATION OF PREGNANCY: ICD-10-CM

## 2024-01-04 DIAGNOSIS — R03.0 ELEVATED BLOOD PRESSURE READING: Primary | ICD-10-CM

## 2024-01-04 LAB
ALBUMIN SERPL BCP-MCNC: 2.9 G/DL (ref 3.5–5.2)
ALP SERPL-CCNC: 124 U/L (ref 55–135)
ALT SERPL W/O P-5'-P-CCNC: 9 U/L (ref 10–44)
ANION GAP SERPL CALC-SCNC: 11 MMOL/L (ref 8–16)
AST SERPL-CCNC: 18 U/L (ref 10–40)
BASOPHILS # BLD AUTO: 0.04 K/UL (ref 0–0.2)
BASOPHILS NFR BLD: 0.3 % (ref 0–1.9)
BILIRUB SERPL-MCNC: 0.5 MG/DL (ref 0.1–1)
BILIRUB SERPL-MCNC: NORMAL MG/DL
BLOOD URINE, POC: NORMAL
BUN SERPL-MCNC: 8 MG/DL (ref 6–20)
CALCIUM SERPL-MCNC: 9 MG/DL (ref 8.7–10.5)
CHLORIDE SERPL-SCNC: 101 MMOL/L (ref 95–110)
CO2 SERPL-SCNC: 20 MMOL/L (ref 23–29)
COLOR, POC UA: YELLOW
CREAT SERPL-MCNC: 0.8 MG/DL (ref 0.5–1.4)
CREAT UR-MCNC: 214.5 MG/DL (ref 15–325)
DIFFERENTIAL METHOD BLD: ABNORMAL
EOSINOPHIL # BLD AUTO: 0 K/UL (ref 0–0.5)
EOSINOPHIL NFR BLD: 0.2 % (ref 0–8)
ERYTHROCYTE [DISTWIDTH] IN BLOOD BY AUTOMATED COUNT: 13.2 % (ref 11.5–14.5)
EST. GFR  (NO RACE VARIABLE): >60 ML/MIN/1.73 M^2
GLUCOSE SERPL-MCNC: 77 MG/DL (ref 70–110)
GLUCOSE UR QL STRIP: NORMAL
HCT VFR BLD AUTO: 34.5 % (ref 37–48.5)
HGB BLD-MCNC: 11.5 G/DL (ref 12–16)
IMM GRANULOCYTES # BLD AUTO: 0.09 K/UL (ref 0–0.04)
IMM GRANULOCYTES NFR BLD AUTO: 0.8 % (ref 0–0.5)
KETONES UR QL STRIP: NORMAL
LEUKOCYTE ESTERASE URINE, POC: NORMAL
LYMPHOCYTES # BLD AUTO: 2.7 K/UL (ref 1–4.8)
LYMPHOCYTES NFR BLD: 22.8 % (ref 18–48)
MCH RBC QN AUTO: 30.7 PG (ref 27–31)
MCHC RBC AUTO-ENTMCNC: 33.3 G/DL (ref 32–36)
MCV RBC AUTO: 92 FL (ref 82–98)
MONOCYTES # BLD AUTO: 1 K/UL (ref 0.3–1)
MONOCYTES NFR BLD: 8.5 % (ref 4–15)
NEUTROPHILS # BLD AUTO: 8.1 K/UL (ref 1.8–7.7)
NEUTROPHILS NFR BLD: 67.4 % (ref 38–73)
NITRITE, POC UA: NORMAL
NRBC BLD-RTO: 0 /100 WBC
PH, POC UA: 5
PLATELET # BLD AUTO: 182 K/UL (ref 150–450)
PMV BLD AUTO: 12.8 FL (ref 9.2–12.9)
POTASSIUM SERPL-SCNC: 3.9 MMOL/L (ref 3.5–5.1)
PROT SERPL-MCNC: 6.7 G/DL (ref 6–8.4)
PROT UR-MCNC: 19 MG/DL (ref 0–15)
PROT/CREAT UR: 0.09 MG/G{CREAT} (ref 0–0.2)
PROTEIN, POC: NORMAL
RBC # BLD AUTO: 3.74 M/UL (ref 4–5.4)
SODIUM SERPL-SCNC: 132 MMOL/L (ref 136–145)
SPECIFIC GRAVITY, POC UA: 1.02
UROBILINOGEN, POC UA: NORMAL
WBC # BLD AUTO: 11.96 K/UL (ref 3.9–12.7)

## 2024-01-04 PROCEDURE — 59025 FETAL NON-STRESS TEST: CPT

## 2024-01-04 PROCEDURE — 82570 ASSAY OF URINE CREATININE: CPT | Performed by: OBSTETRICS & GYNECOLOGY

## 2024-01-04 PROCEDURE — 80053 COMPREHEN METABOLIC PANEL: CPT | Performed by: OBSTETRICS & GYNECOLOGY

## 2024-01-04 PROCEDURE — 81002 URINALYSIS NONAUTO W/O SCOPE: CPT

## 2024-01-04 PROCEDURE — 99284 EMERGENCY DEPT VISIT MOD MDM: CPT | Mod: 25,,, | Performed by: OBSTETRICS & GYNECOLOGY

## 2024-01-04 PROCEDURE — 85025 COMPLETE CBC W/AUTO DIFF WBC: CPT | Performed by: OBSTETRICS & GYNECOLOGY

## 2024-01-04 PROCEDURE — 59025 FETAL NON-STRESS TEST: CPT | Mod: 26,,, | Performed by: OBSTETRICS & GYNECOLOGY

## 2024-01-04 PROCEDURE — 99284 EMERGENCY DEPT VISIT MOD MDM: CPT | Mod: 25

## 2024-01-05 ENCOUNTER — TELEPHONE (OUTPATIENT)
Dept: OBSTETRICS AND GYNECOLOGY | Facility: CLINIC | Age: 34
End: 2024-01-05
Payer: COMMERCIAL

## 2024-01-05 NOTE — DISCHARGE INSTRUCTIONS
Call clinic [phone] or L&D after hours [phone] for:     Vaginal bleeding   Leaking of fluids,   Contractions (4 to 5 in ONE hour),   Decreased fetal movement (10 kicks in 2 hours),   Headache not relieved by Tylenol,   Blurry vision, or   Temp 100.4 or greater.    Begin doing fetal kick counts at least 10 kicks in 2 hours, starting at 28 weeks of gestation.    Keep next clinic appointment (see date and time below):

## 2024-01-05 NOTE — TELEPHONE ENCOUNTER
Attempted to contact Gypsy Rivera today, 1/5/2024, at approximately 12:43 PM.  There was no answer.     No VM identifier.  Left a message with my name and callback number. Wanted to check in.

## 2024-01-05 NOTE — ED PROVIDER NOTES
Encounter Date: 2024       History     Chief Complaint   Patient presents with    Hypertension     Gypsy Rivera is a 33 y.o. [id number] at 36w0d presents complaining of elevated blood pressure at home. Patient reports that her blood pressures at home have been 147/95, 153/102, and 153/106. Patient states that she was having some blurry vision for 15 minutes yesterday, but this has subsequently resolved at this time. Patient denies any HA, vision changes, SOB, CP, or RUQ pain at this time.  This IUP is complicated by ADHD (Adderal), Anxiety/Depression (Cymbalta, Trazadone).  Patient denies contractions, denies vaginal bleeding, denies LOF.   Fetal Movement: normal      Review of patient's allergies indicates:  No Known Allergies  Past Medical History:   Diagnosis Date    ADD (attention deficit disorder)     Anxiety     Kidney stones      Past Surgical History:   Procedure Laterality Date    BACK SURGERY  2022    COSMETIC SURGERY      rhinoplasty     Family History   Problem Relation Age of Onset    Heart disease Mother     Cancer Maternal Grandfather         Prostate     Eczema Sister     Melanoma Neg Hx     Psoriasis Neg Hx     Lupus Neg Hx      Social History     Tobacco Use    Smoking status: Never    Smokeless tobacco: Never   Substance Use Topics    Alcohol use: Not Currently     Comment: occasional    Drug use: No     Review of Systems   Constitutional:  Negative for chills and fever.   HENT:  Negative for congestion and sore throat.    Eyes:  Negative for visual disturbance.   Respiratory:  Negative for cough and shortness of breath.    Cardiovascular:  Negative for chest pain and palpitations.   Gastrointestinal:  Negative for abdominal pain, constipation, diarrhea, nausea and vomiting.   Genitourinary:  Negative for dysuria, hematuria, vaginal bleeding and vaginal discharge.   Musculoskeletal:  Negative for back pain.   Neurological:  Negative for dizziness, light-headedness and headaches.        Physical Exam     Initial Vitals [01/04/24 1945]   BP Pulse Resp Temp SpO2   137/88 95 18 98.4 °F (36.9 °C) 98 %      MAP       --         Physical Exam    Vitals reviewed.  Constitutional: She appears well-developed and well-nourished.   HENT:   Head: Normocephalic and atraumatic.   Eyes: EOM are normal.   Cardiovascular:  Normal rate, regular rhythm and normal heart sounds.           Pulmonary/Chest: Breath sounds normal. No respiratory distress.   Abdominal: Abdomen is soft. There is no abdominal tenderness.   Gravid abdomen There is no rebound and no guarding.   Musculoskeletal:         General: No tenderness or edema.     Neurological: She is alert and oriented to person, place, and time.   Skin: Skin is warm and dry.   Psychiatric: She has a normal mood and affect. Her behavior is normal. Thought content normal.     OB LABOR EXAM:       Method: Sterile vaginal exam per MD.       Dilatation: 0.   Station: -3.   Effacement: 50%.       Comments: SVE: ft/50/-3 per Dr. Burk       ED Course   Fetal non-stress test    Date/Time: 1/4/2024 7:46 PM    Performed by: Jinny Ramirez MD  Authorized by: Jinny Ramirez MD    Nonstress Test:     Variability:  6-25 BPM    Decelerations:  None    Accelerations:  15 bpm    Baseline:  120    Contractions:  Irregular  Biophysical Profile:     Nonstress Test Interpretation: reactive      Overall Impression:  Reassuring  Post-procedure:     Patient tolerance:  Patient tolerated the procedure well with no immediate complications    Labs Reviewed   COMPREHENSIVE METABOLIC PANEL - Abnormal; Notable for the following components:       Result Value    Sodium 132 (*)     CO2 20 (*)     Albumin 2.9 (*)     ALT 9 (*)     All other components within normal limits   CBC W/ AUTO DIFFERENTIAL - Abnormal; Notable for the following components:    RBC 3.74 (*)     Hemoglobin 11.5 (*)     Hematocrit 34.5 (*)     Immature Granulocytes 0.8 (*)     Gran # (ANC) 8.1 (*)     Immature Grans (Abs)  0.09 (*)     All other components within normal limits   PROTEIN / CREATININE RATIO, URINE - Abnormal; Notable for the following components:    Protein, Urine Random 19 (*)     All other components within normal limits    Narrative:     Specimen Source->Urine   POCT URINALYSIS, DIPSTICK OR TABLET REAGENT, AUTOMATED, WITH MICROSCOP        Recent Labs   Lab 01/04/24 2005 01/04/24 2014   WBC  --  11.96   HGB  --  11.5*   HCT  --  34.5*   PLT  --  182   BUN  --  8   CREATININE  --  0.8   ALT  --  9*   AST  --  18   UTPCR 0.09  --          Imaging Results    None          Medications - No data to display  Medical Decision Making      Temp:  [98.4 °F (36.9 °C)] 98.4 °F (36.9 °C)  Pulse:  [75-95] 95  Resp:  [18] 18  SpO2:  [98 %-100 %] 100 %  BP: (136-146)/(88-99) 146/93     NST: reactive and reassuring  TOCO: irregular contractions  SVE: ft/50/-3 per Dr. Burk    Mild range blood pressures noted in the ERIK, however patient does not meet criteria for gHTN at this time  PreE labs obtained - WNL as above    - Patient stable for discharge at this time  - ED return precautions given  - She voiced understanding and is in agreement with the plan       Amount and/or Complexity of Data Reviewed  Labs: ordered.              Attending Attestation:   Physician Attestation Statement for Resident:  As the supervising MD   Physician Attestation Statement: I have personally seen and examined this patient.   I agree with the above history.  -:   As the supervising MD I agree with the above PE.     As the supervising MD I agree with the above treatment, course, plan, and disposition.   I was personally present during the critical portions of the procedure(s) performed by the resident and was immediately available in the ED to provide services and assistance as needed during the entire procedure.                                         Clinical Impression:  Final diagnoses:  [R03.0] Elevated blood pressure reading (Primary)  [Z3A.36] 36  weeks gestation of pregnancy          ED Disposition Condition    Discharge Stable          ED Prescriptions    None       Follow-up Information    None          Kasey Burk MD  01/05/24 3150

## 2024-01-09 ENCOUNTER — HOSPITAL ENCOUNTER (EMERGENCY)
Facility: OTHER | Age: 34
Discharge: HOME | End: 2024-01-09
Attending: OBSTETRICS & GYNECOLOGY
Payer: COMMERCIAL

## 2024-01-09 ENCOUNTER — ROUTINE PRENATAL (OUTPATIENT)
Dept: OBSTETRICS AND GYNECOLOGY | Facility: CLINIC | Age: 34
End: 2024-01-09
Payer: COMMERCIAL

## 2024-01-09 VITALS
WEIGHT: 169.31 LBS | SYSTOLIC BLOOD PRESSURE: 140 MMHG | DIASTOLIC BLOOD PRESSURE: 90 MMHG | BODY MASS INDEX: 29.06 KG/M2

## 2024-01-09 VITALS — DIASTOLIC BLOOD PRESSURE: 88 MMHG | TEMPERATURE: 98 F | SYSTOLIC BLOOD PRESSURE: 134 MMHG | HEART RATE: 85 BPM

## 2024-01-09 DIAGNOSIS — R03.0 ELEVATED BLOOD PRESSURE READING: Primary | ICD-10-CM

## 2024-01-09 DIAGNOSIS — Z3A.36 36 WEEKS GESTATION OF PREGNANCY: Primary | ICD-10-CM

## 2024-01-09 DIAGNOSIS — Z3A.36 36 WEEKS GESTATION OF PREGNANCY: ICD-10-CM

## 2024-01-09 LAB
ALBUMIN SERPL BCP-MCNC: 2.4 G/DL (ref 3.5–5.2)
ALP SERPL-CCNC: 114 U/L (ref 55–135)
ALT SERPL W/O P-5'-P-CCNC: 7 U/L (ref 10–44)
ANION GAP SERPL CALC-SCNC: 10 MMOL/L (ref 8–16)
AST SERPL-CCNC: 15 U/L (ref 10–40)
BASOPHILS # BLD AUTO: 0.02 K/UL (ref 0–0.2)
BASOPHILS NFR BLD: 0.2 % (ref 0–1.9)
BILIRUB SERPL-MCNC: 0.3 MG/DL (ref 0.1–1)
BILIRUB SERPL-MCNC: NEGATIVE MG/DL
BLOOD URINE, POC: NEGATIVE
BUN SERPL-MCNC: 7 MG/DL (ref 6–20)
CALCIUM SERPL-MCNC: 8.6 MG/DL (ref 8.7–10.5)
CHLORIDE SERPL-SCNC: 107 MMOL/L (ref 95–110)
CO2 SERPL-SCNC: 17 MMOL/L (ref 23–29)
COLOR, POC UA: YELLOW
CREAT SERPL-MCNC: 0.7 MG/DL (ref 0.5–1.4)
CREAT UR-MCNC: 94 MG/DL (ref 15–325)
DIFFERENTIAL METHOD BLD: ABNORMAL
EOSINOPHIL # BLD AUTO: 0 K/UL (ref 0–0.5)
EOSINOPHIL NFR BLD: 0.2 % (ref 0–8)
ERYTHROCYTE [DISTWIDTH] IN BLOOD BY AUTOMATED COUNT: 13.7 % (ref 11.5–14.5)
EST. GFR  (NO RACE VARIABLE): >60 ML/MIN/1.73 M^2
GLUCOSE SERPL-MCNC: 84 MG/DL (ref 70–110)
GLUCOSE UR QL STRIP: NORMAL
HCT VFR BLD AUTO: 33 % (ref 37–48.5)
HGB BLD-MCNC: 11.2 G/DL (ref 12–16)
IMM GRANULOCYTES # BLD AUTO: 0.06 K/UL (ref 0–0.04)
IMM GRANULOCYTES NFR BLD AUTO: 0.7 % (ref 0–0.5)
KETONES UR QL STRIP: NEGATIVE
LEUKOCYTE ESTERASE URINE, POC: NORMAL
LYMPHOCYTES # BLD AUTO: 1.9 K/UL (ref 1–4.8)
LYMPHOCYTES NFR BLD: 22.8 % (ref 18–48)
MCH RBC QN AUTO: 30.9 PG (ref 27–31)
MCHC RBC AUTO-ENTMCNC: 33.9 G/DL (ref 32–36)
MCV RBC AUTO: 91 FL (ref 82–98)
MONOCYTES # BLD AUTO: 0.6 K/UL (ref 0.3–1)
MONOCYTES NFR BLD: 7.4 % (ref 4–15)
NEUTROPHILS # BLD AUTO: 5.7 K/UL (ref 1.8–7.7)
NEUTROPHILS NFR BLD: 68.7 % (ref 38–73)
NITRITE, POC UA: NEGATIVE
NRBC BLD-RTO: 0 /100 WBC
PH, POC UA: 6
PLATELET # BLD AUTO: 163 K/UL (ref 150–450)
PMV BLD AUTO: 12.8 FL (ref 9.2–12.9)
POTASSIUM SERPL-SCNC: 3.5 MMOL/L (ref 3.5–5.1)
PROT SERPL-MCNC: 6 G/DL (ref 6–8.4)
PROT UR-MCNC: 10 MG/DL (ref 0–15)
PROT/CREAT UR: 0.11 MG/G{CREAT} (ref 0–0.2)
PROTEIN, POC: NEGATIVE
RBC # BLD AUTO: 3.63 M/UL (ref 4–5.4)
SODIUM SERPL-SCNC: 134 MMOL/L (ref 136–145)
SPECIFIC GRAVITY, POC UA: 1.02
UROBILINOGEN, POC UA: NORMAL
WBC # BLD AUTO: 8.28 K/UL (ref 3.9–12.7)

## 2024-01-09 PROCEDURE — 99999 PR PBB SHADOW E&M-EST. PATIENT-LVL III: CPT | Mod: PBBFAC,,, | Performed by: STUDENT IN AN ORGANIZED HEALTH CARE EDUCATION/TRAINING PROGRAM

## 2024-01-09 PROCEDURE — 81002 URINALYSIS NONAUTO W/O SCOPE: CPT

## 2024-01-09 PROCEDURE — 99284 EMERGENCY DEPT VISIT MOD MDM: CPT | Mod: 25

## 2024-01-09 PROCEDURE — 87081 CULTURE SCREEN ONLY: CPT | Performed by: STUDENT IN AN ORGANIZED HEALTH CARE EDUCATION/TRAINING PROGRAM

## 2024-01-09 PROCEDURE — 59025 FETAL NON-STRESS TEST: CPT

## 2024-01-09 PROCEDURE — 0502F SUBSEQUENT PRENATAL CARE: CPT | Mod: CPTII,S$GLB,, | Performed by: STUDENT IN AN ORGANIZED HEALTH CARE EDUCATION/TRAINING PROGRAM

## 2024-01-09 PROCEDURE — 84156 ASSAY OF PROTEIN URINE: CPT

## 2024-01-09 PROCEDURE — 85025 COMPLETE CBC W/AUTO DIFF WBC: CPT

## 2024-01-09 PROCEDURE — 80053 COMPREHEN METABOLIC PANEL: CPT

## 2024-01-09 PROCEDURE — 99284 EMERGENCY DEPT VISIT MOD MDM: CPT | Mod: 25,,, | Performed by: OBSTETRICS & GYNECOLOGY

## 2024-01-09 PROCEDURE — 59025 FETAL NON-STRESS TEST: CPT | Mod: 26,,, | Performed by: OBSTETRICS & GYNECOLOGY

## 2024-01-09 NOTE — DISCHARGE INSTRUCTIONS
Return to [facility] for the following:    Decreased fetal movement  Leakage of fluid like your water broke  Contractions  Vaginal bleeding    Elevated blood pressure 160/110, or elevated blood pressure with a headache that doesn't go away with tylenol, double vision, nausea and vomiting, pain underneath your right breast or in the middle of your belly, chest pain, or shortness of breath.    If you have any questions, please call [phone].

## 2024-01-09 NOTE — ED PROVIDER NOTES
Encounter Date: 2024     History     Chief Complaint   Patient presents with    Hypertension     Ms. Rivera is a 34yo  at 36w5d with an IUP complicated by ADHD (Adderal), Anxiety/Depression (Cymbalta, Trazadone) here after being seen in clinic and observed to have elevated BP readings.  She denies s/s of preE.  She denies LoF, PV bleeding/spotting nor ctxs, and endorses good fetal movement.  She has no other concerns at this time.    Review of patient's allergies indicates:  No Known Allergies  Past Medical History:   Diagnosis Date    ADD (attention deficit disorder)     Anxiety     Kidney stones      Past Surgical History:   Procedure Laterality Date    BACK SURGERY  2022    COSMETIC SURGERY      rhinoplasty     Family History   Problem Relation Age of Onset    Heart disease Mother     Cancer Maternal Grandfather         Prostate     Eczema Sister     Melanoma Neg Hx     Psoriasis Neg Hx     Lupus Neg Hx      Social History     Tobacco Use    Smoking status: Never    Smokeless tobacco: Never   Substance Use Topics    Alcohol use: Not Currently     Comment: occasional    Drug use: No     Review of Systems   Constitutional:  Negative for chills, fatigue and fever.   Eyes:  Negative for visual disturbance.   Respiratory:  Negative for cough and shortness of breath.    Cardiovascular:  Negative for chest pain and palpitations.   Gastrointestinal:  Negative for nausea and vomiting.   Genitourinary:  Negative for dysuria, hematuria and vaginal bleeding.   Neurological:  Negative for light-headedness and headaches.     Physical Exam     Initial Vitals [24 1633]   BP Pulse Resp Temp SpO2   134/88 85 -- 98.3 °F (36.8 °C) --      MAP       --         Physical Exam    Nursing note and vitals reviewed.  Constitutional: She appears well-developed and well-nourished. She is not diaphoretic. No distress.   HENT:   Head: Normocephalic and atraumatic.   Eyes: EOM are normal.   Neck: Neck supple.   Normal range  of motion.  Cardiovascular:  Normal rate and intact distal pulses.           Pulmonary/Chest: No respiratory distress. She exhibits no tenderness.   Abdominal: Abdomen is soft. Bowel sounds are normal. She exhibits no distension.   Musculoskeletal:         General: Normal range of motion.      Cervical back: Normal range of motion and neck supple.     Neurological: She is alert and oriented to person, place, and time. She has normal strength.   Skin: Skin is warm and dry.   Psychiatric: She has a normal mood and affect. Her behavior is normal. Judgment and thought content normal.       ED Course   Obtain Fetal nonstress test (NST)    Date/Time: 1/9/2024 7:21 PM    Performed by: Tho Gonzalez MD  Authorized by: Tho Gonzalez MD    Nonstress Test:     Variability:  6-25 BPM    Decelerations:  None    Accelerations:  15 bpm    Baseline:  140    Uterine Irritability: No    Biophysical Profile:     Nonstress Test Interpretation: reactive      Overall Impression:  Reassuring    Labs Reviewed   COMPREHENSIVE METABOLIC PANEL - Abnormal; Notable for the following components:       Result Value    Sodium 134 (*)     CO2 17 (*)     Calcium 8.6 (*)     Albumin 2.4 (*)     ALT 7 (*)     All other components within normal limits   CBC W/ AUTO DIFFERENTIAL - Abnormal; Notable for the following components:    RBC 3.63 (*)     Hemoglobin 11.2 (*)     Hematocrit 33.0 (*)     Immature Granulocytes 0.7 (*)     Immature Grans (Abs) 0.06 (*)     All other components within normal limits   PROTEIN / CREATININE RATIO, URINE    Narrative:     Specimen Source->Urine   POCT URINALYSIS, DIPSTICK OR TABLET REAGENT, AUTOMATED, WITH MICROSCOP          Imaging Results    None          Medications - No data to display  Medical Decision Making  Elevated but not severe range BP on arrival  All other VSS stable  NST RR    CBC/CMP wnl  PCR: 0.11    Denies s/s of preE  Pt stable and amendable to discharge.  Will f/u in clinic.    Amount and/or  Complexity of Data Reviewed  Labs: ordered.              Attending Attestation:   Physician Attestation Statement for Resident:  As the supervising MD   Physician Attestation Statement: I have personally seen and examined this patient.   I agree with the above history.  -:   As the supervising MD I agree with the above PE.     As the supervising MD I agree with the above treatment, course, plan, and disposition.   I was personally present during the critical portions of the procedure(s) performed by the resident and was immediately available in the ED to provide services and assistance as needed during the entire procedure.  I have reviewed and agree with the residents interpretation of the following: lab data.             Attending ED Notes:   I have personally seen and examined the patient. I agree with the resident's note . Questions welcomed and answered to patient satisfaction.      @ 36w5d  presenting for newly diagnosed gHTN  NST: reactive and reassuring  Pt does not meet criteria for severe features, plan  for IOL at 37 weeks unless earlier indications arise.     Agree with discharge to home, and given the following instructions: Resume normal activities, encouraged to hydrate well (3L or 100oz of fluids daily). Return to the OB ED for acute concerns such as vaginal bleeding, frequent or painful contractions, loss of fluid or decreased fetal movement.         Selene Murillo MD  2024 8:06 PM                                 Clinical Impression:  Final diagnoses:  [R03.0] Elevated blood pressure reading (Primary)  [Z3A.36] 36 weeks gestation of pregnancy          ED Disposition Condition    Discharge Stable          ED Prescriptions    None       Follow-up Information       Follow up With Specialties Details Why Contact Info    Induction of Labor  Go in 2 days Labor and Delivery, 6th floor of [facility] Await call from L&D to confirm arrival time.             Carlos  MD Tho  Resident  01/09/24 1927       Selene Snyder MD  01/09/24 2007

## 2024-01-09 NOTE — PROGRESS NOTES
at 36w5d   Doing well overall, +FM, -LOF/VB/CTX.   Delivery, blood, and circ consent forms reviewed and signed, pt was queried and without questions. GBS, 3TMS labs today. [facility] ED precautions reviewed. RTC weekly.     gHTN  - Manual BP repeat by /98, 148/98. Also recent elevated BP during OB ED visit.  - Denies s/sx of preeclampsia.  - Discussed new diagnosis of gHTN. Discussed national ACOG recommendation for induction at 37wks.   - Patient agreeable to induction on  5AM. SHM sent to clinic office to help set this up.   - Explicitly and extensively discussed increased risks of preeclampsia, eclampsia, heart complications, heart failure, kidney problems, stillbirth, labor dystocia, CS risk, NICU stay,  morbidity and mortality.  - Discussed gHTN may lead to preeclampsia in this pregnancy.   - To OB ED now for labs (CBC, CMP, PCR), prenatal testing.

## 2024-01-10 ENCOUNTER — TELEPHONE (OUTPATIENT)
Dept: OBSTETRICS AND GYNECOLOGY | Facility: CLINIC | Age: 34
End: 2024-01-10
Payer: COMMERCIAL

## 2024-01-10 DIAGNOSIS — F41.9 ANXIETY: ICD-10-CM

## 2024-01-10 DIAGNOSIS — O09.891 MEDICATION EXPOSURE DURING FIRST TRIMESTER OF PREGNANCY: ICD-10-CM

## 2024-01-10 DIAGNOSIS — O13.3 GESTATIONAL HYPERTENSION, THIRD TRIMESTER: Primary | ICD-10-CM

## 2024-01-10 DIAGNOSIS — R00.0 TACHYCARDIA: ICD-10-CM

## 2024-01-10 DIAGNOSIS — N20.0 NEPHROLITHIASIS: ICD-10-CM

## 2024-01-10 NOTE — H&P
HISTORY AND PHYSICAL                                                OBSTETRICS          Subjective:       Gypsy Rivera is a 33 y.o.  female with IUP at 36w6d weeks gestation who presented for her last routine OB visit. Induction is scheduled for 2024 for indication of gHTN.  Patient denied vaginal bleeding, contractions, LOF.   Fetal Movement: normal.    This IUP is complicated by gHTN, nephrolithiasis requiring inpatient admission, multiple medication use, ADD, anxiety, GERD, varicella negative and rubella NONimmune, scoliosis, and sinus tachycardia.      Past Medical History:   Diagnosis Date    ADD (attention deficit disorder)     Anxiety     Kidney stones        PSHx:   Past Surgical History:   Procedure Laterality Date    BACK SURGERY  2022    COSMETIC SURGERY      rhinoplasty       All: Review of patient's allergies indicates:  No Known Allergies    Meds: (Not in a hospital admission)      SH:   Social History     Socioeconomic History    Marital status: Single   Occupational History     Employer: [employer]   Tobacco Use    Smoking status: Never    Smokeless tobacco: Never   Substance and Sexual Activity    Alcohol use: Not Currently     Comment: occasional    Drug use: No    Sexual activity: Yes     Partners: Male   Social History Narrative    ** Merged History Encounter **         Graduated from [employer] in international business studies. She has an apartment in [place]. She plans to attend law school at [employer]. Her family lives in [place]. Her mom is practice manager for the Cardiovascular surgeons at [employer]. She is the oldest of saurabh    n to 8 children.        FH:   Family History   Problem Relation Age of Onset    Heart disease Mother     Cancer Maternal Grandfather         Prostate     Eczema Sister     Melanoma Neg Hx     Psoriasis Neg Hx     Lupus Neg Hx        OBHx:   OB History    Para Term  AB Living   1 0 0 0 0 0    SAB IAB Ectopic Multiple Live Births   0 0 0 0 0      # Outcome Date GA Lbr Richard/2nd Weight Sex Delivery Anes PTL Lv   1 Current                Objective:       BP (!) 140/90   Wt 76.8 kg (169 lb 5 oz)   LMP 04/25/2023   BMI 29.06 kg/m²     Vitals:    01/09/24 1516   BP: (!) 140/90   Weight: 76.8 kg (169 lb 5 oz)       General:   alert, appears stated age and cooperative, no apparent distress   HENT:  normocephalic, atraumatic   Eyes:  extraocular movements and conjunctivae normal   Lungs:   no respiratory distress, no accessory muscle use to breathe   Heart:   well perfused throughout   Abdomen:  nontender, gravid   Extremities:  negative erythema   FHT: Verified in clinic and NST to be done upon L&D admit   Presentations: cephalic    Cervix: FT/30%/-4    Consistency: soft    Position: posterior   Sterile Speculum Exam: n/a    Recent Growth Scan: 19TH%TILE on 1/2, EFW 2506g    Lab Review  Blood Type B POS  GBBS: updated GBS is pending  Rubella: Not immune  RPR: nonreactive  HIV: negative  HepB: negative       Assessment:       36w6d weeks gestation     Plan:      Risks, benefits, alternatives and possible complications have been discussed in detail with the patient.   - Consents signed.  - Patient instructed to present to Labor and Delivery unit at the time given to her by the nurse that will phone her. Flexibility in time slot reviewed.   - 3TMS labs not collected in OB ED, will need to be obtained at presentation  - Planning for pit/machado Induction but will evaluate induction plan per cervical exam upon arrival to L&D  - Patient aware of the role of hospitalists and residents as a part of our hospital care team    Post-Partum Hemorrhage risk - medium    gHTN (diagnosed at 36wks), nephrolithiasis requiring inpatient admission (s/p antepartum admission, passed stone), multiple medication use (US with 1/2 visit. Serial GS. S/p MFM consult. NICU at delivery), ADD, anxiety, GERD (TUMS, pepcid), varicella negative  and rubella NONimmune, scoliosis (s/p OB Anesthesia consult), and sinus tachycardia (EKG, holter, ECHO with EF 70%, M consult 9/7).

## 2024-01-10 NOTE — TELEPHONE ENCOUNTER
----- Message from Kasey Oscar MD sent at 2024  7:12 PM CST -----  Please schedule for induction    Indxn  5 AM    32y/o  who will be 37w1d, new diagnosis of gHTN; anxiety, tachycardia, nephrolithiasis, and multiple medication use in pregnancy

## 2024-01-10 NOTE — TELEPHONE ENCOUNTER
----- Message from Kasey Oscar MD sent at 1/10/2024 11:41 AM CST -----  Patient with new diagnosis of gHTN. She needs an ultrasound for BPP TODAY, TOMORROW (Thurs), OR FRIDAY.  Please schedule. Thank you!  Induction is this weekend.

## 2024-01-10 NOTE — TELEPHONE ENCOUNTER
Called pt to follow up in regards to scheduling US  No answer  LVM and call back number    Sent pt portal     ND

## 2024-01-12 ENCOUNTER — ROUTINE PRENATAL (OUTPATIENT)
Dept: OBSTETRICS AND GYNECOLOGY | Facility: CLINIC | Age: 34
End: 2024-01-12
Payer: COMMERCIAL

## 2024-01-12 ENCOUNTER — PROCEDURE VISIT (OUTPATIENT)
Dept: OBSTETRICS AND GYNECOLOGY | Facility: CLINIC | Age: 34
End: 2024-01-12
Payer: COMMERCIAL

## 2024-01-12 VITALS
BODY MASS INDEX: 29.29 KG/M2 | SYSTOLIC BLOOD PRESSURE: 118 MMHG | WEIGHT: 170.63 LBS | DIASTOLIC BLOOD PRESSURE: 82 MMHG

## 2024-01-12 DIAGNOSIS — O13.3 GESTATIONAL HYPERTENSION, THIRD TRIMESTER: ICD-10-CM

## 2024-01-12 DIAGNOSIS — Z3A.37 37 WEEKS GESTATION OF PREGNANCY: Primary | ICD-10-CM

## 2024-01-12 LAB — BACTERIA SPEC AEROBE CULT: NORMAL

## 2024-01-12 PROCEDURE — 0502F SUBSEQUENT PRENATAL CARE: CPT | Mod: CPTII,S$GLB,,

## 2024-01-12 PROCEDURE — 76819 FETAL BIOPHYS PROFIL W/O NST: CPT | Mod: S$GLB,,, | Performed by: OBSTETRICS & GYNECOLOGY

## 2024-01-12 PROCEDURE — 99999 PR PBB SHADOW E&M-EST. PATIENT-LVL III: CPT | Mod: PBBFAC,,,

## 2024-01-12 NOTE — PROGRESS NOTES
Here for routine OB appt at 37w1d, with no complaints.  Presents with spouse.    Newly diagnosed with gHTN in 3rd trimester.  BPP today 8/8. /82.  Denies HA's, vision changes, RUQ pain, swelling, or SOB.  Reports good FM.  Denies LOF, denies VB, denies contractions.    Reviewed warning signs of Labor and Preeclampsia.  Daily FM counts reinforced.  IOL scheduled for tomorrow 1/13 at 0500.

## 2024-01-13 ENCOUNTER — HOSPITAL ENCOUNTER (INPATIENT)
Facility: OTHER | Age: 34
LOS: 4 days | Discharge: HOME | End: 2024-01-17
Attending: STUDENT IN AN ORGANIZED HEALTH CARE EDUCATION/TRAINING PROGRAM | Admitting: STUDENT IN AN ORGANIZED HEALTH CARE EDUCATION/TRAINING PROGRAM
Payer: COMMERCIAL

## 2024-01-13 ENCOUNTER — ANESTHESIA EVENT (OUTPATIENT)
Dept: OBSTETRICS AND GYNECOLOGY | Facility: OTHER | Age: 34
End: 2024-01-13
Payer: COMMERCIAL

## 2024-01-13 ENCOUNTER — ANESTHESIA (OUTPATIENT)
Dept: OBSTETRICS AND GYNECOLOGY | Facility: OTHER | Age: 34
End: 2024-01-13
Payer: COMMERCIAL

## 2024-01-13 DIAGNOSIS — O13.3 GESTATIONAL HYPERTENSION, THIRD TRIMESTER: ICD-10-CM

## 2024-01-13 DIAGNOSIS — R00.0 TACHYCARDIA: ICD-10-CM

## 2024-01-13 DIAGNOSIS — F41.9 ANXIETY: ICD-10-CM

## 2024-01-13 DIAGNOSIS — N20.0 NEPHROLITHIASIS: ICD-10-CM

## 2024-01-13 DIAGNOSIS — Z34.90 ENCOUNTER FOR INDUCTION OF LABOR: ICD-10-CM

## 2024-01-13 DIAGNOSIS — O09.891 MEDICATION EXPOSURE DURING FIRST TRIMESTER OF PREGNANCY: ICD-10-CM

## 2024-01-13 PROBLEM — O09.899 RUBELLA NON-IMMUNE STATUS, ANTEPARTUM: Status: ACTIVE | Noted: 2024-01-13

## 2024-01-13 PROBLEM — K21.9 GERD (GASTROESOPHAGEAL REFLUX DISEASE): Status: ACTIVE | Noted: 2024-01-13

## 2024-01-13 PROBLEM — Z28.39 SUSCEPTIBLE TO VARICELLA (NON-IMMUNE), CURRENTLY PREGNANT: Status: ACTIVE | Noted: 2024-01-13

## 2024-01-13 PROBLEM — O09.899 SUSCEPTIBLE TO VARICELLA (NON-IMMUNE), CURRENTLY PREGNANT: Status: ACTIVE | Noted: 2024-01-13

## 2024-01-13 PROBLEM — Z28.39 RUBELLA NON-IMMUNE STATUS, ANTEPARTUM: Status: ACTIVE | Noted: 2024-01-13

## 2024-01-13 LAB
ABO + RH BLD: NORMAL
ALBUMIN SERPL BCP-MCNC: 2.5 G/DL (ref 3.5–5.2)
ALP SERPL-CCNC: 122 U/L (ref 55–135)
ALT SERPL W/O P-5'-P-CCNC: 7 U/L (ref 10–44)
ANION GAP SERPL CALC-SCNC: 10 MMOL/L (ref 8–16)
AST SERPL-CCNC: 15 U/L (ref 10–40)
BASOPHILS # BLD AUTO: 0.03 K/UL (ref 0–0.2)
BASOPHILS NFR BLD: 0.4 % (ref 0–1.9)
BILIRUB SERPL-MCNC: 0.3 MG/DL (ref 0.1–1)
BLD GP AB SCN CELLS X3 SERPL QL: NORMAL
BUN SERPL-MCNC: 6 MG/DL (ref 6–20)
CALCIUM SERPL-MCNC: 9.6 MG/DL (ref 8.7–10.5)
CHLORIDE SERPL-SCNC: 107 MMOL/L (ref 95–110)
CO2 SERPL-SCNC: 19 MMOL/L (ref 23–29)
CREAT SERPL-MCNC: 0.7 MG/DL (ref 0.5–1.4)
CREAT UR-MCNC: 71.2 MG/DL (ref 15–325)
DIFFERENTIAL METHOD BLD: ABNORMAL
EOSINOPHIL # BLD AUTO: 0 K/UL (ref 0–0.5)
EOSINOPHIL NFR BLD: 0.4 % (ref 0–8)
ERYTHROCYTE [DISTWIDTH] IN BLOOD BY AUTOMATED COUNT: 13.6 % (ref 11.5–14.5)
EST. GFR  (NO RACE VARIABLE): >60 ML/MIN/1.73 M^2
GLUCOSE SERPL-MCNC: 95 MG/DL (ref 70–110)
HCT VFR BLD AUTO: 35.7 % (ref 37–48.5)
HGB BLD-MCNC: 11.9 G/DL (ref 12–16)
HIV 1+2 AB+HIV1 P24 AG SERPL QL IA: NEGATIVE
IMM GRANULOCYTES # BLD AUTO: 0.06 K/UL (ref 0–0.04)
IMM GRANULOCYTES NFR BLD AUTO: 0.8 % (ref 0–0.5)
LYMPHOCYTES # BLD AUTO: 1.8 K/UL (ref 1–4.8)
LYMPHOCYTES NFR BLD: 25.1 % (ref 18–48)
MCH RBC QN AUTO: 30.4 PG (ref 27–31)
MCHC RBC AUTO-ENTMCNC: 33.3 G/DL (ref 32–36)
MCV RBC AUTO: 91 FL (ref 82–98)
MONOCYTES # BLD AUTO: 0.4 K/UL (ref 0.3–1)
MONOCYTES NFR BLD: 5.3 % (ref 4–15)
NEUTROPHILS # BLD AUTO: 4.8 K/UL (ref 1.8–7.7)
NEUTROPHILS NFR BLD: 68 % (ref 38–73)
NRBC BLD-RTO: 0 /100 WBC
PLATELET # BLD AUTO: 158 K/UL (ref 150–450)
PMV BLD AUTO: 12.7 FL (ref 9.2–12.9)
POTASSIUM SERPL-SCNC: 3.8 MMOL/L (ref 3.5–5.1)
PROT SERPL-MCNC: 6.2 G/DL (ref 6–8.4)
PROT UR-MCNC: 9 MG/DL (ref 0–15)
PROT/CREAT UR: 0.13 MG/G{CREAT} (ref 0–0.2)
RBC # BLD AUTO: 3.91 M/UL (ref 4–5.4)
RPR SER QL: NORMAL
SODIUM SERPL-SCNC: 136 MMOL/L (ref 136–145)
WBC # BLD AUTO: 7.12 K/UL (ref 3.9–12.7)

## 2024-01-13 PROCEDURE — 84156 ASSAY OF PROTEIN URINE: CPT

## 2024-01-13 PROCEDURE — 11000001 HC ACUTE MED/SURG PRIVATE ROOM

## 2024-01-13 PROCEDURE — 80053 COMPREHEN METABOLIC PANEL: CPT

## 2024-01-13 PROCEDURE — 51702 INSERT TEMP BLADDER CATH: CPT

## 2024-01-13 PROCEDURE — 72100002 HC LABOR CARE, 1ST 8 HOURS

## 2024-01-13 PROCEDURE — 63600175 PHARM REV CODE 636 W HCPCS

## 2024-01-13 PROCEDURE — 25000003 PHARM REV CODE 250

## 2024-01-13 PROCEDURE — 27200710 HC EPIDURAL INFUSION PUMP SET: Performed by: ANESTHESIOLOGY

## 2024-01-13 PROCEDURE — C1751 CATH, INF, PER/CENT/MIDLINE: HCPCS | Performed by: ANESTHESIOLOGY

## 2024-01-13 PROCEDURE — 10907ZC DRAINAGE OF AMNIOTIC FLUID, THERAPEUTIC FROM PRODUCTS OF CONCEPTION, VIA NATURAL OR ARTIFICIAL OPENING: ICD-10-PCS | Performed by: STUDENT IN AN ORGANIZED HEALTH CARE EDUCATION/TRAINING PROGRAM

## 2024-01-13 PROCEDURE — 62326 NJX INTERLAMINAR LMBR/SAC: CPT

## 2024-01-13 PROCEDURE — 59510 CESAREAN DELIVERY: CPT | Mod: AA,,, | Performed by: ANESTHESIOLOGY

## 2024-01-13 PROCEDURE — 85025 COMPLETE CBC W/AUTO DIFF WBC: CPT

## 2024-01-13 PROCEDURE — 63600175 PHARM REV CODE 636 W HCPCS: Performed by: STUDENT IN AN ORGANIZED HEALTH CARE EDUCATION/TRAINING PROGRAM

## 2024-01-13 PROCEDURE — 01968 ANES/ANALG CS DLVR NEURAXIAL: CPT | Mod: AA,,, | Performed by: ANESTHESIOLOGY

## 2024-01-13 PROCEDURE — 87389 HIV-1 AG W/HIV-1&-2 AB AG IA: CPT

## 2024-01-13 PROCEDURE — 59200 INSERT CERVICAL DILATOR: CPT

## 2024-01-13 PROCEDURE — 86592 SYPHILIS TEST NON-TREP QUAL: CPT

## 2024-01-13 PROCEDURE — 86850 RBC ANTIBODY SCREEN: CPT

## 2024-01-13 RX ORDER — SODIUM CHLORIDE, SODIUM LACTATE, POTASSIUM CHLORIDE, CALCIUM CHLORIDE 600; 310; 30; 20 MG/100ML; MG/100ML; MG/100ML; MG/100ML
INJECTION, SOLUTION INTRAVENOUS CONTINUOUS
Status: DISCONTINUED | OUTPATIENT
Start: 2024-01-13 | End: 2024-01-14

## 2024-01-13 RX ORDER — DULOXETIN HYDROCHLORIDE 30 MG/1
60 CAPSULE, DELAYED RELEASE ORAL NIGHTLY
Status: DISCONTINUED | OUTPATIENT
Start: 2024-01-13 | End: 2024-01-14

## 2024-01-13 RX ORDER — OXYTOCIN/RINGER'S LACTATE 30/500 ML
95 PLASTIC BAG, INJECTION (ML) INTRAVENOUS ONCE AS NEEDED
Status: DISCONTINUED | OUTPATIENT
Start: 2024-01-13 | End: 2024-01-14

## 2024-01-13 RX ORDER — FAMOTIDINE 10 MG/ML
20 INJECTION INTRAVENOUS ONCE
Status: CANCELLED | OUTPATIENT
Start: 2024-01-13 | End: 2024-01-13

## 2024-01-13 RX ORDER — PROPRANOLOL HYDROCHLORIDE 10 MG/1
10 TABLET ORAL 2 TIMES DAILY
Status: DISCONTINUED | OUTPATIENT
Start: 2024-01-13 | End: 2024-01-14

## 2024-01-13 RX ORDER — DEXTROAMPHETAMINE SACCHARATE, AMPHETAMINE ASPARTATE MONOHYDRATE, DEXTROAMPHETAMINE SULFATE AND AMPHETAMINE SULFATE 7.5; 7.5; 7.5; 7.5 MG/1; MG/1; MG/1; MG/1
30 CAPSULE, EXTENDED RELEASE ORAL EVERY MORNING
Status: DISCONTINUED | OUTPATIENT
Start: 2024-01-14 | End: 2024-01-14

## 2024-01-13 RX ORDER — OXYTOCIN/RINGER'S LACTATE 30/500 ML
95 PLASTIC BAG, INJECTION (ML) INTRAVENOUS ONCE
Status: DISCONTINUED | OUTPATIENT
Start: 2024-01-13 | End: 2024-01-14

## 2024-01-13 RX ORDER — OXYTOCIN 10 [USP'U]/ML
10 INJECTION, SOLUTION INTRAMUSCULAR; INTRAVENOUS ONCE AS NEEDED
Status: DISCONTINUED | OUTPATIENT
Start: 2024-01-13 | End: 2024-01-14

## 2024-01-13 RX ORDER — SIMETHICONE 80 MG
1 TABLET,CHEWABLE ORAL 4 TIMES DAILY PRN
Status: DISCONTINUED | OUTPATIENT
Start: 2024-01-13 | End: 2024-01-14

## 2024-01-13 RX ORDER — TERBUTALINE SULFATE 1 MG/ML
INJECTION SUBCUTANEOUS
Status: COMPLETED
Start: 2024-01-13 | End: 2024-01-13

## 2024-01-13 RX ORDER — FENTANYL/BUPIVACAINE/NS/PF 2MCG/ML-.1
PLASTIC BAG, INJECTION (ML) INJECTION
Status: DISCONTINUED | OUTPATIENT
Start: 2024-01-13 | End: 2024-01-14

## 2024-01-13 RX ORDER — OXYTOCIN/RINGER'S LACTATE 30/500 ML
334 PLASTIC BAG, INJECTION (ML) INTRAVENOUS ONCE
Status: DISCONTINUED | OUTPATIENT
Start: 2024-01-13 | End: 2024-01-14

## 2024-01-13 RX ORDER — DULOXETIN HYDROCHLORIDE 30 MG/1
60 CAPSULE, DELAYED RELEASE ORAL DAILY
Status: DISCONTINUED | OUTPATIENT
Start: 2024-01-13 | End: 2024-01-13

## 2024-01-13 RX ORDER — FENTANYL/BUPIVACAINE/NS/PF 2MCG/ML-.1
PLASTIC BAG, INJECTION (ML) INJECTION
Status: COMPLETED
Start: 2024-01-13 | End: 2024-01-13

## 2024-01-13 RX ORDER — CARBOPROST TROMETHAMINE 250 UG/ML
250 INJECTION, SOLUTION INTRAMUSCULAR
Status: DISCONTINUED | OUTPATIENT
Start: 2024-01-13 | End: 2024-01-14

## 2024-01-13 RX ORDER — OXYTOCIN 10 [USP'U]/ML
10 INJECTION, SOLUTION INTRAMUSCULAR; INTRAVENOUS
Status: DISCONTINUED | OUTPATIENT
Start: 2024-01-13 | End: 2024-01-14

## 2024-01-13 RX ORDER — SODIUM CHLORIDE 9 MG/ML
INJECTION, SOLUTION INTRAVENOUS
Status: DISCONTINUED | OUTPATIENT
Start: 2024-01-13 | End: 2024-01-14

## 2024-01-13 RX ORDER — TRANEXAMIC ACID 10 MG/ML
1000 INJECTION, SOLUTION INTRAVENOUS EVERY 30 MIN PRN
Status: DISCONTINUED | OUTPATIENT
Start: 2024-01-13 | End: 2024-01-14

## 2024-01-13 RX ORDER — CALCIUM CARBONATE 200(500)MG
1000 TABLET,CHEWABLE ORAL 2 TIMES DAILY PRN
Status: DISCONTINUED | OUTPATIENT
Start: 2024-01-13 | End: 2024-01-13

## 2024-01-13 RX ORDER — LIDOCAINE HYDROCHLORIDE 10 MG/ML
10 INJECTION INFILTRATION; PERINEURAL ONCE AS NEEDED
Status: DISCONTINUED | OUTPATIENT
Start: 2024-01-13 | End: 2024-01-14

## 2024-01-13 RX ORDER — OXYTOCIN/RINGER'S LACTATE 30/500 ML
0-32 PLASTIC BAG, INJECTION (ML) INTRAVENOUS CONTINUOUS
Status: DISCONTINUED | OUTPATIENT
Start: 2024-01-13 | End: 2024-01-14

## 2024-01-13 RX ORDER — METHYLERGONOVINE MALEATE 0.2 MG/ML
200 INJECTION INTRAVENOUS ONCE AS NEEDED
Status: DISCONTINUED | OUTPATIENT
Start: 2024-01-13 | End: 2024-01-14

## 2024-01-13 RX ORDER — PROCHLORPERAZINE EDISYLATE 5 MG/ML
5 INJECTION INTRAMUSCULAR; INTRAVENOUS EVERY 6 HOURS PRN
Status: DISCONTINUED | OUTPATIENT
Start: 2024-01-13 | End: 2024-01-14

## 2024-01-13 RX ORDER — MISOPROSTOL 200 UG/1
800 TABLET ORAL ONCE AS NEEDED
Status: DISCONTINUED | OUTPATIENT
Start: 2024-01-13 | End: 2024-01-14

## 2024-01-13 RX ORDER — ACETAMINOPHEN 325 MG/1
650 TABLET ORAL EVERY 6 HOURS PRN
Status: DISCONTINUED | OUTPATIENT
Start: 2024-01-13 | End: 2024-01-14

## 2024-01-13 RX ORDER — FAMOTIDINE 20 MG/1
20 TABLET, FILM COATED ORAL 2 TIMES DAILY
Status: DISCONTINUED | OUTPATIENT
Start: 2024-01-13 | End: 2024-01-14

## 2024-01-13 RX ORDER — DIPHENOXYLATE HYDROCHLORIDE AND ATROPINE SULFATE 2.5; .025 MG/1; MG/1
2 TABLET ORAL EVERY 6 HOURS PRN
Status: DISCONTINUED | OUTPATIENT
Start: 2024-01-13 | End: 2024-01-14

## 2024-01-13 RX ORDER — SODIUM CITRATE AND CITRIC ACID MONOHYDRATE 334; 500 MG/5ML; MG/5ML
30 SOLUTION ORAL ONCE
Status: CANCELLED | OUTPATIENT
Start: 2024-01-13 | End: 2024-01-13

## 2024-01-13 RX ORDER — CALCIUM CARBONATE 200(500)MG
500 TABLET,CHEWABLE ORAL 3 TIMES DAILY PRN
Status: DISCONTINUED | OUTPATIENT
Start: 2024-01-13 | End: 2024-01-14

## 2024-01-13 RX ORDER — DULOXETIN HYDROCHLORIDE 30 MG/1
60 CAPSULE, DELAYED RELEASE ORAL DAILY
Status: DISCONTINUED | OUTPATIENT
Start: 2024-01-14 | End: 2024-01-13

## 2024-01-13 RX ORDER — OXYTOCIN/RINGER'S LACTATE 30/500 ML
334 PLASTIC BAG, INJECTION (ML) INTRAVENOUS ONCE AS NEEDED
Status: DISCONTINUED | OUTPATIENT
Start: 2024-01-13 | End: 2024-01-14

## 2024-01-13 RX ORDER — ONDANSETRON 8 MG/1
8 TABLET, ORALLY DISINTEGRATING ORAL EVERY 8 HOURS PRN
Status: DISCONTINUED | OUTPATIENT
Start: 2024-01-13 | End: 2024-01-14

## 2024-01-13 RX ORDER — LIDOCAINE HYDROCHLORIDE AND EPINEPHRINE 15; 5 MG/ML; UG/ML
INJECTION, SOLUTION EPIDURAL
Status: DISCONTINUED | OUTPATIENT
Start: 2024-01-13 | End: 2024-01-14

## 2024-01-13 RX ADMIN — Medication 6 MILLI-UNITS/MIN: at 10:01

## 2024-01-13 RX ADMIN — FAMOTIDINE 20 MG: 20 TABLET ORAL at 09:01

## 2024-01-13 RX ADMIN — LIDOCAINE HYDROCHLORIDE,EPINEPHRINE BITARTRATE 3 ML: 15; .005 INJECTION, SOLUTION EPIDURAL; INFILTRATION; INTRACAUDAL; PERINEURAL at 01:01

## 2024-01-13 RX ADMIN — Medication 4 MILLI-UNITS/MIN: at 12:01

## 2024-01-13 RX ADMIN — DULOXETINE HYDROCHLORIDE 60 MG: 30 CAPSULE, DELAYED RELEASE ORAL at 10:01

## 2024-01-13 RX ADMIN — Medication 8 ML/HR: at 01:01

## 2024-01-13 RX ADMIN — FAMOTIDINE 20 MG: 20 TABLET ORAL at 02:01

## 2024-01-13 RX ADMIN — ONDANSETRON 8 MG: 8 TABLET, ORALLY DISINTEGRATING ORAL at 10:01

## 2024-01-13 RX ADMIN — PROPRANOLOL HYDROCHLORIDE 10 MG: 10 TABLET ORAL at 09:01

## 2024-01-13 RX ADMIN — SODIUM CHLORIDE, POTASSIUM CHLORIDE, SODIUM LACTATE AND CALCIUM CHLORIDE 125 ML/HR: 600; 310; 30; 20 INJECTION, SOLUTION INTRAVENOUS at 11:01

## 2024-01-13 RX ADMIN — TERBUTALINE SULFATE 1 MG: 1 INJECTION SUBCUTANEOUS at 10:01

## 2024-01-13 RX ADMIN — Medication 5 ML: at 01:01

## 2024-01-13 RX ADMIN — SODIUM CHLORIDE, POTASSIUM CHLORIDE, SODIUM LACTATE AND CALCIUM CHLORIDE 125 ML/HR: 600; 310; 30; 20 INJECTION, SOLUTION INTRAVENOUS at 04:01

## 2024-01-13 NOTE — ANESTHESIA PROCEDURE NOTES
Epidural    Patient location during procedure: OB   Reason for block: primary anesthetic   Reason for block: labor analgesia requested by patient and obstetrician  Diagnosis: IUP   Start time: 1/13/2024 11:10 AM  Timeout: 1/13/2024 11:10 AM  End time: 1/13/2024 11:21 AM  Surgery related to: Vaginal Delivery    Staffing  Performing Provider: Zachary Donato DO  Authorizing Provider: Rupinder Song MD    Staffing  Performed by: Zachary Donato DO  Authorized by: Rupinder Song MD        Preanesthetic Checklist  Completed: patient identified, IV checked, site marked, risks and benefits discussed, surgical consent, monitors and equipment checked, pre-op evaluation, timeout performed, anesthesia consent given, hand hygiene performed and patient being monitored  Preparation  Patient position: sitting  Prep: ChloraPrep  Patient monitoring: Pulse Ox  Reason for block: primary anesthetic   Epidural  Skin Anesthetic: lidocaine 1%  Skin Wheal: 3 mL  Administration type: continuous  Approach: midline  Interspace: L3-4    Injection technique: ISREAL saline  Needle and Epidural Catheter  Needle type: Tuohy   Needle gauge: 17  Needle length: 3.5 inches  Needle insertion depth: 5 cm  Catheter type: Zendrive  Catheter size: 19 G  Catheter at skin depth: 9 cm  Insertion Attempts: 1  Test dose: 3 mL of lidocaine 1.5% with Epi 1-to-200,000  Additional Documentation: incremental injection, negative aspiration for heme and CSF, no paresthesia on injection, no signs/symptoms of IV or SA injection, no significant pain on injection and no significant complaints from patient  Needle localization: anatomical landmarks  Medications:  Volume per aspiration: 5 mL  Time between aspirations: 5 minutes   Assessment  Ease of block: easy  Patient's tolerance of the procedure: comfortable throughout block and no complaints No inadvertent dural puncture with Tuohy.  Dural puncture performed with spinal needle.

## 2024-01-13 NOTE — ANESTHESIA PREPROCEDURE EVALUATION
Gypsy Rivera is a 33 y.o. female that is  37w2d presenting for IOL. Pregnancy c/b gHTN. HX of GERD, ADHD, anxiety. She reports a hx of laminectomy for lumbar radiculopathy. Op note [found in cared everywhere from 2022] details a left L4 hemilaminectomy with a L4-5 diskectomy.  OB anesthesia consult note advises to avoid L4-L5 space.     OB History    Para Term  AB Living   1 0 0 0 0     SAB IAB Ectopic Multiple Live Births   0 0 0          # Outcome Date GA Lbr Richard/2nd Weight Sex Delivery Anes PTL Lv   1 Current                Wt Readings from Last 1 Encounters:   24 0937 77.4 kg (170 lb 10.2 oz)       BP Readings from Last 3 Encounters:   24 136/88   24 118/82   24 134/88       Patient Active Problem List   Diagnosis    Kidney stones    Lumbar strain    Nephrolithiasis    Nasal deformity    Medication exposure during first trimester of pregnancy    Tachycardia    Anxiety    ADHD    History of back surgery    31 weeks gestation of pregnancy    Encounter for induction of labor    Susceptible to varicella (non-immune), currently pregnant    Rubella non-immune status, antepartum    GERD (gastroesophageal reflux disease)    Gestational hypertension, third trimester       Past Surgical History:   Procedure Laterality Date    BACK SURGERY  2022    COSMETIC SURGERY      rhinoplasty       Social History     Socioeconomic History    Marital status: Single   Occupational History     Employer: [employer]   Tobacco Use    Smoking status: Never    Smokeless tobacco: Never   Substance and Sexual Activity    Alcohol use: Not Currently     Comment: occasional    Drug use: No    Sexual activity: Yes     Partners: Male   Social History Narrative    ** Merged History Encounter **         Graduated from [employer] in international business studies. She has an apartment in [place]. She plans to attend law school at [employer]. Her family lives in [place]. Her mom is practice  "manager for the Cardiovascular surgeons at [employer]. She is the oldest of saurabh    n to 8 children.          Chemistry        Component Value Date/Time     01/13/2024 1031    K 3.8 01/13/2024 1031     01/13/2024 1031    CO2 19 (L) 01/13/2024 1031    BUN 6 01/13/2024 1031    CREATININE 0.7 01/13/2024 1031    GLU 95 01/13/2024 1031        Component Value Date/Time    CALCIUM 9.6 01/13/2024 1031    ALKPHOS 122 01/13/2024 1031    AST 15 01/13/2024 1031    ALT 7 (L) 01/13/2024 1031    BILITOT 0.3 01/13/2024 1031    ESTGFRAFRICA >60.0 03/06/2020 2332    EGFRNONAA >60.0 03/06/2020 2332            Lab Results   Component Value Date    WBC 7.12 01/13/2024    HGB 11.9 (L) 01/13/2024    HCT 35.7 (L) 01/13/2024    MCV 91 01/13/2024     01/13/2024       No results for input(s): "PT", "INR", "PROTIME", "APTT" in the last 72 hours.                Pre-op Assessment    I have reviewed the Patient Summary Reports.     I have reviewed the Nursing Notes. I have reviewed the NPO Status.   I have reviewed the Medications.     Review of Systems  Anesthesia Hx:   Neg history of prior surgery.          Denies Family Hx of Anesthesia complications.    Denies Personal Hx of Anesthesia complications.                    Social:  Non-Smoker       Hematology/Oncology:       -- Denies Anemia:                                  Cardiovascular:     Hypertension   Denies MI.     Denies CABG/stent.     Denies CHF.       ECG has been reviewed.                          Pulmonary:    Denies COPD.  Denies Asthma.                    Renal/:   Denies Chronic Renal Disease.                Hepatic/GI:      Denies GERD. Denies Liver Disease.            Musculoskeletal:         Spine Disorders:             Neurological:    Denies CVA.    Denies Seizures.                                Endocrine:  Denies Diabetes. Denies Hypothyroidism.              Physical Exam  General: Well nourished, Cooperative, Alert and Oriented    Airway:  Tongue: " Normal        Anesthesia Plan  Type of Anesthesia, risks & benefits discussed:    Anesthesia Type: Gen ETT, Spinal, Epidural, CSE  Intra-op Monitoring Plan: Standard ASA Monitors  Post Op Pain Control Plan: multimodal analgesia and IV/PO Opioids PRN  Induction:  IV  Airway Plan: Direct and Video  Informed Consent: Informed consent signed with the Patient and all parties understand the risks and agree with anesthesia plan.  All questions answered. Patient consented to blood products? Yes  ASA Score: 2  Day of Surgery Review of History & Physical: H&P Update referred to the surgeon/provider.    Ready For Surgery From Anesthesia Perspective.     .

## 2024-01-13 NOTE — H&P
Gypsy Rivera is 33 y.o.  presenting for scheduled IOL in the setting of gHTN.    Temp:  [98.5 °F (36.9 °C)] 98.5 °F (36.9 °C)  Pulse:  [91-98] 91  Resp:  [18] 18  SpO2:  [97 %-98 %] 97 %    General: NAD, alert, oriented, cooperative  HEENT: NCAT, EOM grossly intact  Lungs: Normal WOB  Heart: regular rate  Abdomen: soft, nondistended, nontender, no rebound or guarding    SVE /-3, machado balloon placed without difficulty. BSUS vertex.      modBTBV +accels, -decels Cat 1 reassuring  TOCO: Sandee q5 min with uterine irritibility, will administer 500 cc fluid bolus to determine cytotec vs pitocin.    Katherine Boecking MD   Ob/Gyn PGY-4                                            HISTORY AND PHYSICAL                                                OBSTETRICS          Subjective:       Gypsy Rivera is a 33 y.o.  female with IUP at 36w6d weeks gestation who presented for her last routine OB visit. Induction is scheduled for 2024 for indication of gHTN.  Patient denied vaginal bleeding, contractions, LOF.   Fetal Movement: normal.    This IUP is complicated by gHTN, nephrolithiasis requiring inpatient admission, multiple medication use, ADD, anxiety, GERD, varicella negative and rubella NONimmune, scoliosis, and sinus tachycardia.      Past Medical History:   Diagnosis Date    ADD (attention deficit disorder)     Anxiety     Kidney stones        PSHx:   Past Surgical History:   Procedure Laterality Date    BACK SURGERY  2022    COSMETIC SURGERY      rhinoplasty       All: Review of patient's allergies indicates:  No Known Allergies    Meds:   Medications Prior to Admission   Medication Sig Dispense Refill Last Dose    cetirizine (ZYRTEC) 10 MG tablet Take 10 mg by mouth once daily.       dextroamphetamine-amphetamine (ADDERALL XR) 30 MG 24 hr capsule Take 1 capsule (30 mg total) by mouth every morning. 30 capsule 0     diphenhydramine HCl (UNISOM, DIPHENHYDRAMINE, ORAL) Take  by mouth once daily. Once nightly.       DULoxetine (CYMBALTA) 60 MG capsule Take 120 mg by mouth.       famotidine (PEPCID) 20 MG tablet Take 1 tablet (20 mg total) by mouth 2 (two) times daily. 60 tablet 1     prenatal 25/iron fum/folic/dha (PRENATAL-1 ORAL) Take by mouth.       propranolol (INDERAL) 10 MG tablet Take 10 mg by mouth daily as needed.       pyridoxine HCl, vitamin B6, (VITAMIN B-6 ORAL) Take by mouth once daily. Once daily in the morning.            SH:   Social History     Socioeconomic History    Marital status: Single   Occupational History     Employer: [employer]   Tobacco Use    Smoking status: Never    Smokeless tobacco: Never   Substance and Sexual Activity    Alcohol use: Not Currently     Comment: occasional    Drug use: No    Sexual activity: Yes     Partners: Male   Social History Narrative    ** Merged History Encounter **         Graduated from [employer] in international business studies. She has an apartment in [place]. She plans to attend law school at [employer]. Her family lives in [place]. Her mom is practice manager for the Cardiovascular surgeons at [employer]. She is the oldest of saurabh    n to 8 children.        FH:   Family History   Problem Relation Age of Onset    Heart disease Mother     Cancer Maternal Grandfather         Prostate     Eczema Sister     Melanoma Neg Hx     Psoriasis Neg Hx     Lupus Neg Hx        OBHx:   OB History    Para Term  AB Living   1 0 0 0 0 0   SAB IAB Ectopic Multiple Live Births   0 0 0 0 0      # Outcome Date GA Lbr Richard/2nd Weight Sex Delivery Anes PTL Lv   1 Current                Objective:       Pulse 91   Temp 98.5 °F (36.9 °C) (Oral)   Resp 18   LMP 2023   SpO2 97%   Breastfeeding No     Vitals:    24 1005 24 1010 24 1015   Pulse: 97 98 91   Resp:   18   Temp:   98.5 °F (36.9 °C)   TempSrc:   Oral   SpO2: 98% 98% 97%       General:   alert, appears stated age and cooperative, no apparent  distress   HENT:  normocephalic, atraumatic   Eyes:  extraocular movements and conjunctivae normal   Lungs:   no respiratory distress, no accessory muscle use to breathe   Heart:   well perfused throughout   Abdomen:  nontender, gravid   Extremities:  negative erythema   FHT: Verified in clinic and NST to be done upon L&D admit   Presentations: cephalic    Cervix: FT/30%/-4    Consistency: soft    Position: posterior   Sterile Speculum Exam: n/a    Recent Growth Scan: 19TH%TILE on 1/2, EFW 2506g    Lab Review  Blood Type B POS  GBBS: updated GBS is pending  Rubella: Not immune  RPR: nonreactive  HIV: negative  HepB: negative       Assessment:       36w6d weeks gestation     Plan:      Risks, benefits, alternatives and possible complications have been discussed in detail with the patient.   - Consents signed.  - Patient instructed to present to Labor and Delivery unit at the time given to her by the nurse that will phone her. Flexibility in time slot reviewed.   - 3TMS labs not collected in OB ED, will need to be obtained at presentation  - Planning for pit/machado Induction but will evaluate induction plan per cervical exam upon arrival to L&D  - Patient aware of the role of hospitalists and residents as a part of our hospital care team    Post-Partum Hemorrhage risk - medium    gHTN (diagnosed at 36wks), nephrolithiasis requiring inpatient admission (s/p antepartum admission, passed stone), multiple medication use (US with 1/2 visit. Serial GS. S/p MFM consult. NICU at delivery), ADD, anxiety, GERD (TUMS, pepcid), varicella negative and rubella NONimmune, scoliosis (s/p OB Anesthesia consult), and sinus tachycardia (EKG, holter, ECHO with EF 70%, MFM consult 9/7).

## 2024-01-13 NOTE — PROGRESS NOTES
Md notified of severe pressure during epidural.  Rn asked to monitor and notify MD of additional severe pressures after epidural.

## 2024-01-14 PROCEDURE — 25000003 PHARM REV CODE 250: Performed by: STUDENT IN AN ORGANIZED HEALTH CARE EDUCATION/TRAINING PROGRAM

## 2024-01-14 PROCEDURE — 37000008 HC ANESTHESIA 1ST 15 MINUTES: Performed by: STUDENT IN AN ORGANIZED HEALTH CARE EDUCATION/TRAINING PROGRAM

## 2024-01-14 PROCEDURE — 71000033 HC RECOVERY, INTIAL HOUR: Performed by: STUDENT IN AN ORGANIZED HEALTH CARE EDUCATION/TRAINING PROGRAM

## 2024-01-14 PROCEDURE — 36004725 HC OB OR TIME LEV III - EA ADD 15 MIN: Performed by: STUDENT IN AN ORGANIZED HEALTH CARE EDUCATION/TRAINING PROGRAM

## 2024-01-14 PROCEDURE — 11000001 HC ACUTE MED/SURG PRIVATE ROOM

## 2024-01-14 PROCEDURE — 36004724 HC OB OR TIME LEV III - 1ST 15 MIN: Performed by: STUDENT IN AN ORGANIZED HEALTH CARE EDUCATION/TRAINING PROGRAM

## 2024-01-14 PROCEDURE — 71000039 HC RECOVERY, EACH ADD'L HOUR: Performed by: STUDENT IN AN ORGANIZED HEALTH CARE EDUCATION/TRAINING PROGRAM

## 2024-01-14 PROCEDURE — 59510 CESAREAN DELIVERY: CPT | Mod: AT,,, | Performed by: STUDENT IN AN ORGANIZED HEALTH CARE EDUCATION/TRAINING PROGRAM

## 2024-01-14 PROCEDURE — 25000003 PHARM REV CODE 250

## 2024-01-14 PROCEDURE — 63600175 PHARM REV CODE 636 W HCPCS: Mod: JZ,JG | Performed by: STUDENT IN AN ORGANIZED HEALTH CARE EDUCATION/TRAINING PROGRAM

## 2024-01-14 PROCEDURE — 37000009 HC ANESTHESIA EA ADD 15 MINS: Performed by: STUDENT IN AN ORGANIZED HEALTH CARE EDUCATION/TRAINING PROGRAM

## 2024-01-14 PROCEDURE — 63600175 PHARM REV CODE 636 W HCPCS: Performed by: STUDENT IN AN ORGANIZED HEALTH CARE EDUCATION/TRAINING PROGRAM

## 2024-01-14 PROCEDURE — 63600175 PHARM REV CODE 636 W HCPCS

## 2024-01-14 RX ORDER — PROCHLORPERAZINE EDISYLATE 5 MG/ML
5 INJECTION INTRAMUSCULAR; INTRAVENOUS EVERY 6 HOURS PRN
Status: DISCONTINUED | OUTPATIENT
Start: 2024-01-14 | End: 2024-01-17 | Stop reason: HOSPADM

## 2024-01-14 RX ORDER — FAMOTIDINE 20 MG/1
20 TABLET, FILM COATED ORAL 2 TIMES DAILY
Status: DISCONTINUED | OUTPATIENT
Start: 2024-01-14 | End: 2024-01-17 | Stop reason: HOSPADM

## 2024-01-14 RX ORDER — SODIUM CHLORIDE, SODIUM LACTATE, POTASSIUM CHLORIDE, CALCIUM CHLORIDE 600; 310; 30; 20 MG/100ML; MG/100ML; MG/100ML; MG/100ML
INJECTION, SOLUTION INTRAVENOUS CONTINUOUS PRN
Status: DISCONTINUED | OUTPATIENT
Start: 2024-01-14 | End: 2024-01-14

## 2024-01-14 RX ORDER — DEXTROAMPHETAMINE SACCHARATE, AMPHETAMINE ASPARTATE MONOHYDRATE, DEXTROAMPHETAMINE SULFATE AND AMPHETAMINE SULFATE 7.5; 7.5; 7.5; 7.5 MG/1; MG/1; MG/1; MG/1
30 CAPSULE, EXTENDED RELEASE ORAL EVERY MORNING
Status: DISCONTINUED | OUTPATIENT
Start: 2024-01-14 | End: 2024-01-16

## 2024-01-14 RX ORDER — ACETAMINOPHEN 10 MG/ML
INJECTION, SOLUTION INTRAVENOUS
Status: DISCONTINUED | OUTPATIENT
Start: 2024-01-14 | End: 2024-01-14

## 2024-01-14 RX ORDER — CHLOROPROCAINE HYDROCHLORIDE 30 MG/ML
INJECTION, SOLUTION EPIDURAL; INFILTRATION; INTRACAUDAL; PERINEURAL
Status: DISCONTINUED | OUTPATIENT
Start: 2024-01-14 | End: 2024-01-14

## 2024-01-14 RX ORDER — LIDOCAINE HCL/EPINEPHRINE/PF 2%-1:200K
VIAL (ML) INJECTION
Status: DISCONTINUED | OUTPATIENT
Start: 2024-01-14 | End: 2024-01-14

## 2024-01-14 RX ORDER — OXYCODONE AND ACETAMINOPHEN 10; 325 MG/1; MG/1
1 TABLET ORAL EVERY 4 HOURS PRN
Status: DISCONTINUED | OUTPATIENT
Start: 2024-01-15 | End: 2024-01-17 | Stop reason: HOSPADM

## 2024-01-14 RX ORDER — FENTANYL CITRATE 50 UG/ML
INJECTION, SOLUTION INTRAMUSCULAR; INTRAVENOUS
Status: DISCONTINUED | OUTPATIENT
Start: 2024-01-14 | End: 2024-01-14

## 2024-01-14 RX ORDER — CARBOPROST TROMETHAMINE 250 UG/ML
250 INJECTION, SOLUTION INTRAMUSCULAR
Status: DISCONTINUED | OUTPATIENT
Start: 2024-01-14 | End: 2024-01-17 | Stop reason: HOSPADM

## 2024-01-14 RX ORDER — MISOPROSTOL 200 UG/1
800 TABLET ORAL ONCE AS NEEDED
Status: DISCONTINUED | OUTPATIENT
Start: 2024-01-14 | End: 2024-01-17 | Stop reason: HOSPADM

## 2024-01-14 RX ORDER — CEFAZOLIN SODIUM 1 G/3ML
INJECTION, POWDER, FOR SOLUTION INTRAMUSCULAR; INTRAVENOUS
Status: DISPENSED
Start: 2024-01-14 | End: 2024-01-14

## 2024-01-14 RX ORDER — SODIUM CHLORIDE 0.9 % (FLUSH) 0.9 %
10 SYRINGE (ML) INJECTION
Status: DISCONTINUED | OUTPATIENT
Start: 2024-01-14 | End: 2024-01-17 | Stop reason: HOSPADM

## 2024-01-14 RX ORDER — OXYTOCIN 10 [USP'U]/ML
INJECTION, SOLUTION INTRAMUSCULAR; INTRAVENOUS
Status: DISCONTINUED | OUTPATIENT
Start: 2024-01-14 | End: 2024-01-14

## 2024-01-14 RX ORDER — ONDANSETRON 8 MG/1
8 TABLET, ORALLY DISINTEGRATING ORAL EVERY 8 HOURS PRN
Status: DISCONTINUED | OUTPATIENT
Start: 2024-01-14 | End: 2024-01-17 | Stop reason: HOSPADM

## 2024-01-14 RX ORDER — OXYCODONE HYDROCHLORIDE 10 MG/1
10 TABLET ORAL EVERY 4 HOURS PRN
Status: DISPENSED | OUTPATIENT
Start: 2024-01-14 | End: 2024-01-15

## 2024-01-14 RX ORDER — ADHESIVE BANDAGE
30 BANDAGE TOPICAL 2 TIMES DAILY PRN
Status: DISCONTINUED | OUTPATIENT
Start: 2024-01-15 | End: 2024-01-17 | Stop reason: HOSPADM

## 2024-01-14 RX ORDER — PRENATAL WITH FERROUS FUM AND FOLIC ACID 3080; 920; 120; 400; 22; 1.84; 3; 20; 10; 1; 12; 200; 27; 25; 2 [IU]/1; [IU]/1; MG/1; [IU]/1; MG/1; MG/1; MG/1; MG/1; MG/1; MG/1; UG/1; MG/1; MG/1; MG/1; MG/1
1 TABLET ORAL DAILY
Status: DISCONTINUED | OUTPATIENT
Start: 2024-01-14 | End: 2024-01-17 | Stop reason: HOSPADM

## 2024-01-14 RX ORDER — MORPHINE SULFATE 0.5 MG/ML
INJECTION, SOLUTION EPIDURAL; INTRATHECAL; INTRAVENOUS
Status: DISCONTINUED | OUTPATIENT
Start: 2024-01-14 | End: 2024-01-14

## 2024-01-14 RX ORDER — ACETAMINOPHEN 325 MG/1
650 TABLET ORAL EVERY 6 HOURS
Status: COMPLETED | OUTPATIENT
Start: 2024-01-14 | End: 2024-01-15

## 2024-01-14 RX ORDER — OXYTOCIN/RINGER'S LACTATE 30/500 ML
95 PLASTIC BAG, INJECTION (ML) INTRAVENOUS ONCE AS NEEDED
Status: DISCONTINUED | OUTPATIENT
Start: 2024-01-14 | End: 2024-01-17 | Stop reason: HOSPADM

## 2024-01-14 RX ORDER — PHENYLEPHRINE HYDROCHLORIDE 10 MG/ML
INJECTION INTRAVENOUS
Status: DISCONTINUED | OUTPATIENT
Start: 2024-01-14 | End: 2024-01-14

## 2024-01-14 RX ORDER — FENTANYL/BUPIVACAINE/NS/PF 2MCG/ML-.1
PLASTIC BAG, INJECTION (ML) INJECTION
Status: COMPLETED
Start: 2024-01-14 | End: 2024-01-14

## 2024-01-14 RX ORDER — PROCHLORPERAZINE EDISYLATE 5 MG/ML
5 INJECTION INTRAMUSCULAR; INTRAVENOUS EVERY 6 HOURS PRN
Status: DISCONTINUED | OUTPATIENT
Start: 2024-01-14 | End: 2024-01-14

## 2024-01-14 RX ORDER — AZITHROMYCIN 100 MG/ML
INJECTION, POWDER, LYOPHILIZED, FOR SOLUTION INTRAVENOUS
Status: DISCONTINUED | OUTPATIENT
Start: 2024-01-14 | End: 2024-01-14

## 2024-01-14 RX ORDER — KETOROLAC TROMETHAMINE 30 MG/ML
30 INJECTION, SOLUTION INTRAMUSCULAR; INTRAVENOUS EVERY 6 HOURS
Status: COMPLETED | OUTPATIENT
Start: 2024-01-14 | End: 2024-01-15

## 2024-01-14 RX ORDER — DULOXETIN HYDROCHLORIDE 30 MG/1
60 CAPSULE, DELAYED RELEASE ORAL NIGHTLY
Status: DISCONTINUED | OUTPATIENT
Start: 2024-01-14 | End: 2024-01-17 | Stop reason: HOSPADM

## 2024-01-14 RX ORDER — AMOXICILLIN 250 MG
1 CAPSULE ORAL NIGHTLY PRN
Status: DISCONTINUED | OUTPATIENT
Start: 2024-01-14 | End: 2024-01-17 | Stop reason: HOSPADM

## 2024-01-14 RX ORDER — ONDANSETRON HYDROCHLORIDE 2 MG/ML
INJECTION, SOLUTION INTRAMUSCULAR; INTRAVENOUS
Status: DISCONTINUED | OUTPATIENT
Start: 2024-01-14 | End: 2024-01-14

## 2024-01-14 RX ORDER — OXYTOCIN/RINGER'S LACTATE 30/500 ML
334 PLASTIC BAG, INJECTION (ML) INTRAVENOUS ONCE AS NEEDED
Status: DISCONTINUED | OUTPATIENT
Start: 2024-01-14 | End: 2024-01-17 | Stop reason: HOSPADM

## 2024-01-14 RX ORDER — BISACODYL 10 MG/1
10 SUPPOSITORY RECTAL ONCE AS NEEDED
Status: DISCONTINUED | OUTPATIENT
Start: 2024-01-14 | End: 2024-01-17 | Stop reason: HOSPADM

## 2024-01-14 RX ORDER — METHYLERGONOVINE MALEATE 0.2 MG/ML
200 INJECTION INTRAVENOUS ONCE AS NEEDED
Status: DISCONTINUED | OUTPATIENT
Start: 2024-01-14 | End: 2024-01-17 | Stop reason: HOSPADM

## 2024-01-14 RX ORDER — PROPRANOLOL HYDROCHLORIDE 10 MG/1
10 TABLET ORAL 2 TIMES DAILY
Status: DISCONTINUED | OUTPATIENT
Start: 2024-01-14 | End: 2024-01-17 | Stop reason: HOSPADM

## 2024-01-14 RX ORDER — KETOROLAC TROMETHAMINE 30 MG/ML
INJECTION, SOLUTION INTRAMUSCULAR; INTRAVENOUS
Status: DISCONTINUED | OUTPATIENT
Start: 2024-01-14 | End: 2024-01-14

## 2024-01-14 RX ORDER — ONDANSETRON HYDROCHLORIDE 2 MG/ML
4 INJECTION, SOLUTION INTRAVENOUS EVERY 6 HOURS PRN
Status: ACTIVE | OUTPATIENT
Start: 2024-01-14 | End: 2024-01-15

## 2024-01-14 RX ORDER — SIMETHICONE 80 MG
1 TABLET,CHEWABLE ORAL EVERY 6 HOURS PRN
Status: DISCONTINUED | OUTPATIENT
Start: 2024-01-14 | End: 2024-01-17 | Stop reason: HOSPADM

## 2024-01-14 RX ORDER — OXYCODONE AND ACETAMINOPHEN 5; 325 MG/1; MG/1
1 TABLET ORAL EVERY 4 HOURS PRN
Status: DISCONTINUED | OUTPATIENT
Start: 2024-01-15 | End: 2024-01-17 | Stop reason: HOSPADM

## 2024-01-14 RX ORDER — TRANEXAMIC ACID 10 MG/ML
1000 INJECTION, SOLUTION INTRAVENOUS EVERY 30 MIN PRN
Status: DISCONTINUED | OUTPATIENT
Start: 2024-01-14 | End: 2024-01-17 | Stop reason: HOSPADM

## 2024-01-14 RX ORDER — DIPHENOXYLATE HYDROCHLORIDE AND ATROPINE SULFATE 2.5; .025 MG/1; MG/1
2 TABLET ORAL EVERY 6 HOURS PRN
Status: DISCONTINUED | OUTPATIENT
Start: 2024-01-14 | End: 2024-01-17 | Stop reason: HOSPADM

## 2024-01-14 RX ORDER — IBUPROFEN 600 MG/1
600 TABLET ORAL EVERY 6 HOURS
Status: DISCONTINUED | OUTPATIENT
Start: 2024-01-15 | End: 2024-01-17 | Stop reason: HOSPADM

## 2024-01-14 RX ORDER — OXYTOCIN 10 [USP'U]/ML
10 INJECTION, SOLUTION INTRAMUSCULAR; INTRAVENOUS ONCE AS NEEDED
Status: DISCONTINUED | OUTPATIENT
Start: 2024-01-14 | End: 2024-01-17 | Stop reason: HOSPADM

## 2024-01-14 RX ORDER — DOCUSATE SODIUM 100 MG/1
200 CAPSULE, LIQUID FILLED ORAL 2 TIMES DAILY
Status: DISCONTINUED | OUTPATIENT
Start: 2024-01-14 | End: 2024-01-17 | Stop reason: HOSPADM

## 2024-01-14 RX ORDER — OXYCODONE HYDROCHLORIDE 5 MG/1
5 TABLET ORAL EVERY 4 HOURS PRN
Status: ACTIVE | OUTPATIENT
Start: 2024-01-14 | End: 2024-01-15

## 2024-01-14 RX ORDER — DIPHENHYDRAMINE HCL 25 MG
25 CAPSULE ORAL EVERY 4 HOURS PRN
Status: DISCONTINUED | OUTPATIENT
Start: 2024-01-14 | End: 2024-01-17 | Stop reason: HOSPADM

## 2024-01-14 RX ORDER — OXYTOCIN/RINGER'S LACTATE 30/500 ML
95 PLASTIC BAG, INJECTION (ML) INTRAVENOUS ONCE
Status: DISCONTINUED | OUTPATIENT
Start: 2024-01-14 | End: 2024-01-17 | Stop reason: HOSPADM

## 2024-01-14 RX ORDER — DULOXETIN HYDROCHLORIDE 30 MG/1
60 CAPSULE, DELAYED RELEASE ORAL DAILY
Status: DISCONTINUED | OUTPATIENT
Start: 2024-01-14 | End: 2024-01-14

## 2024-01-14 RX ADMIN — Medication 8 ML/HR: at 03:01

## 2024-01-14 RX ADMIN — DOCUSATE SODIUM 200 MG: 100 CAPSULE, LIQUID FILLED ORAL at 08:01

## 2024-01-14 RX ADMIN — PHENYLEPHRINE HYDROCHLORIDE 100 MCG: 10 INJECTION INTRAVENOUS at 05:01

## 2024-01-14 RX ADMIN — ACETAMINOPHEN 650 MG: 325 TABLET ORAL at 12:01

## 2024-01-14 RX ADMIN — PROCHLORPERAZINE EDISYLATE 5 MG: 5 INJECTION INTRAMUSCULAR; INTRAVENOUS at 05:01

## 2024-01-14 RX ADMIN — LIDOCAINE HYDROCHLORIDE,EPINEPHRINE BITARTRATE 5 ML: 20; .005 INJECTION, SOLUTION EPIDURAL; INFILTRATION; INTRACAUDAL; PERINEURAL at 05:01

## 2024-01-14 RX ADMIN — ACETAMINOPHEN 1000 MG: 1000 INJECTION INTRAVENOUS at 05:01

## 2024-01-14 RX ADMIN — FENTANYL CITRATE 100 MCG: 0.05 INJECTION, SOLUTION INTRAMUSCULAR; INTRAVENOUS at 05:01

## 2024-01-14 RX ADMIN — OXYTOCIN 3 UNITS: 10 INJECTION, SOLUTION INTRAMUSCULAR; INTRAVENOUS at 05:01

## 2024-01-14 RX ADMIN — KETOROLAC TROMETHAMINE 30 MG: 30 INJECTION, SOLUTION INTRAMUSCULAR; INTRAVENOUS at 12:01

## 2024-01-14 RX ADMIN — KETOROLAC TROMETHAMINE 30 MG: 30 INJECTION, SOLUTION INTRAMUSCULAR at 06:01

## 2024-01-14 RX ADMIN — CHLOROPROCAINE HYDROCHLORIDE 5 ML: 30 INJECTION, SOLUTION EPIDURAL; INFILTRATION; INTRACAUDAL; PERINEURAL at 05:01

## 2024-01-14 RX ADMIN — ONDANSETRON 4 MG: 2 INJECTION INTRAMUSCULAR; INTRAVENOUS at 05:01

## 2024-01-14 RX ADMIN — FAMOTIDINE 20 MG: 20 TABLET ORAL at 08:01

## 2024-01-14 RX ADMIN — OXYCODONE HYDROCHLORIDE 10 MG: 10 TABLET ORAL at 08:01

## 2024-01-14 RX ADMIN — DEXTROSE 2 G: 50 INJECTION, SOLUTION INTRAVENOUS at 05:01

## 2024-01-14 RX ADMIN — PROPRANOLOL HYDROCHLORIDE 10 MG: 10 TABLET ORAL at 08:01

## 2024-01-14 RX ADMIN — KETOROLAC TROMETHAMINE 30 MG: 30 INJECTION, SOLUTION INTRAMUSCULAR; INTRAVENOUS at 05:01

## 2024-01-14 RX ADMIN — ACETAMINOPHEN 650 MG: 325 TABLET ORAL at 05:01

## 2024-01-14 RX ADMIN — SODIUM CHLORIDE, SODIUM LACTATE, POTASSIUM CHLORIDE, AND CALCIUM CHLORIDE: 600; 310; 30; 20 INJECTION, SOLUTION INTRAVENOUS at 05:01

## 2024-01-14 RX ADMIN — KETOROLAC TROMETHAMINE 30 MG: 30 INJECTION, SOLUTION INTRAMUSCULAR; INTRAVENOUS at 11:01

## 2024-01-14 RX ADMIN — PRENATAL VIT W/ FE FUMARATE-FA TAB 27-0.8 MG 1 TABLET: 27-0.8 TAB at 08:01

## 2024-01-14 RX ADMIN — ACETAMINOPHEN 650 MG: 325 TABLET ORAL at 11:01

## 2024-01-14 RX ADMIN — DULOXETINE HYDROCHLORIDE 60 MG: 30 CAPSULE, DELAYED RELEASE ORAL at 08:01

## 2024-01-14 RX ADMIN — MORPHINE SULFATE 1.5 MG: 0.5 INJECTION, SOLUTION EPIDURAL; INTRATHECAL; INTRAVENOUS at 05:01

## 2024-01-14 RX ADMIN — LIDOCAINE HYDROCHLORIDE,EPINEPHRINE BITARTRATE 3 ML: 20; .005 INJECTION, SOLUTION EPIDURAL; INFILTRATION; INTRACAUDAL; PERINEURAL at 05:01

## 2024-01-14 RX ADMIN — AZITHROMYCIN 500 MG: 500 INJECTION, POWDER, LYOPHILIZED, FOR SOLUTION INTRAVENOUS at 05:01

## 2024-01-14 NOTE — L&D DELIVERY NOTE
[facility] - Labor & Delivery   Section   Operative Note    SUMMARY     Date of Procedure: 2024      Section Procedure Note    Procedure:   URGENT Primary Low Transverse  Section via Pfannenstiel skin incision    Indications:   NRFHT     Pre-operative Diagnosis:   IUP at 37 week 3 day pregnancy  gHTN  Nephrolithiasis  Tachycardia  Anxiety  Varicella non-immune  Rubella non-immune  GERD   Multiple medication exposure during pregnancy    Post-operative Diagnosis:   same    Surgeon(s) and Role:     * Kasey Oscar MD - Primary     * Dee Dee Ovalle MD - Resident - Assisting    Anesthesia: Epidural anesthesia and Spinal anesthesia    Findings:    1. Single viable  male infant, weight 2690g, Apgars per NICU team  2. Normal appearing uterus, ovaries, and fallopian tubes.  3. Normal placenta  4. Initial concern for incorporation of bladder in hysterotomy repair; bladder back filled with sterile milk with no evisceration noted; hysterotomy suture removed and vesicouterine peritoneum bluntly freed from region of hysterotomy which was then repaired again; bladder back filled a second time noting no evidence of evisceration; excellent hemostasis noted    Estimated Blood Loss:  820 mL           Total IV Fluids: see anesthesia note     UOP: see anesthesia note    Specimens: none    PreOp CBC:   Lab Results   Component Value Date    WBC 7.12 2024    HGB 11.9 (L) 2024    HCT 35.7 (L) 2024    MCV 91 2024     2024                     Complications:  None; patient tolerated the procedure well.           Disposition: PACU - hemodynamically stable.           Condition: stable    Procedure Details   Patient was completely dilated and set up for pushing, however after pushing with two contractions FHT noted to be in the 60s. Pitocin was paused, fluid bolus given and maternal repositioning performed without return to baseline; after approximately 6 minutes  decision was made to roll to the OR for STAT  section. The patient and support person were counseled on plan and indication for emergent pLTCS. They agreed with the proposed plan, giving informed consent.  The patient was taken to Operating Room, and FHT were noted to be in the 120s. Decision made to proceed with urgent  delivery given significant distress of fetus during pushing. Patient was identified as Gypsy Rivera and the procedure verified as  Delivery. A Time Out was held and the above information confirmed. The patient was prepped and draped in the usual sterile manner while placed in a dorsal supine position with a left lateral tilt.  A machado catheter was also placed per nursing. Preoperative antibiotics Ancef 2 g and azithromycin 500 mg were administered. An allis test was performed to confirm adequate anesthesia. A Pfannenstiel skin incision was made and carried down through the subcutaneous tissue to the fascia. Fascial incision was made and extended transversely. The peritoneum was identified, found to be free of adherent bowel, and entered bluntly. The peritoneal incision was extended longitudinally. The vesico-uterine peritoneum was identified, and bladder blade was inserted keep the bladder out of the operative field. A low transverse uterine incision was made with knife and extended with cephalad caudad traction. The amniotic sac was ruptured upon entry to the hysterotomy and the infant was noted to be in cephalic presentation. The fetal head was brought to the incision and elevated out of the pelvis. The patient delivered a single viable male infant without difficulty.  Infant weighed 2690 grams; Apgars determined by NICU team. After the umbilical cord was clamped and cut, cord blood was obtained for evaluation. The placenta was removed intact, appeared normal, and was discarded. The uterus was exteriorized. The uterine outline, tubes and ovaries appeared normal. The  uterine incision was closed with running locked sutures of 1 chromic. Excellent hemostasis was observed. The posterior cul de sac was cleaned with a moist lap to remove clots. The uterus was then returned to the abdominal cavity. Incision was reinspected and good hemostasis was noted; however there was concern that the inferior aspect of the hysterotomy incorporated superior edge of the bladder. Dr. Burk was called to the OR for assistance. The uterus was exteriorized and the bladder was back filled with approximately 300cc of sterile milk, with no evisceration noted. The bladder was drained and decision was made to remove the suture from the hysterotomy in order to release the suspected superior edge of the bladder. After releasing this area, the hysterotomy was re-closed with 1 chromic in a running locked fashion, ensuring the vesicouterine peritoneum was freely away from the hysterotomy closure. The bladder was then back filled again with approximately 300cc for sterile milk, and again no evisceration was visualized under careful inspection, thus area of original concern suspected to be the vesicouterine peritoneum. The hysterotomy was inspected again and noted to be hemostatic. The uterus was returned to the abdominal cavity. The abdominal cavity was cleaned with a moist lap to remove clots. The fascia was then reapproximated with running sutures of 1 PDS. The subcutaneous fat was irrigated and then was reapproximated with 2-0 plain gut, and skin was reapproximated with 3-0 monocryl in a subcuticular fashion.    Instrument, sponge, and needle counts were correct prior the abdominal closure and at the conclusion of the case.     The patient tolerated procedure well and was taken to the recovery room in stable condition after the procedure.    **NOTE: This patient IS a candidate for trial of labor after  delivery**      Dee Dee Ovalle MD   OB/GYN PGY-2         Delivery Information for Dave Betancur  "Nicole    Birth information:  YOB: [birth year]24   Time of birth: 5:14 AM   Sex: male   Head Delivery Date/Time: 2024  5:14 AM   Delivery type: , Low Transverse   Gestational Age: 37w3d        Delivery Providers    Delivering clinician: Kasey Oscar MD   Provider Role    Dee Dee Ovalle MD Resident    Princess Russell RN Circulator    Lala Gonzalez RN Charge Nurse    [facility]              Measurements    Weight: 2690 g  Weight (lbs): 5 lb 14.9 oz  Length: 48.9 cm  Length (in): 19.25"  Head circumference: 33.7 cm  Chest circumference: 30.5 cm         Apgars    Living status:   Apgar Component Scores:  1 min.:  5 min.:  10 min.:  15 min.:  20 min.:    Skin color:         Heart rate:         Reflex irritability:         Muscle tone:         Respiratory effort:         Total:                  Operative Delivery    Forceps attempted?: No  Vacuum extractor attempted?: No         Shoulder Dystocia    Shoulder dystocia present?: No           Presentation    Presentation: Vertex  Position: Left Occiput Anterior           Interventions/Resuscitation    Method: NICU Attended       Cord    Vessels: 3 vessels  Complications: Knot  Delayed Cord Clamping?: Yes  Cord Clamped Date/Time: 2024  5:15 AM  Cord Blood Disposition: Discarded  Gases Sent?: No  Stem Cell Collection (by MD): No       Placenta    Placenta delivery date/time: 2024 0516  Placenta removal: Manual removal  Placenta appearance: Intact  Placenta disposition: Discarded           Labor Events:       labor: No     Labor Onset Date/Time:         Dilation Complete Date/Time:         Start Pushing Date/Time:         Start Pushing Date/Time:       Rupture Date/Time: 24  1530         Rupture type: ARM (Artificial Rupture)         Fluid Amount:       Fluid Color: Clear               steroids: None     Antibiotics given for GBS: No     Induction:       Indications for induction:  " Elective;Hypertension     Augmentation: oxytocin     Indications for augmentation: Ineffective Contraction Pattern     Labor complications: Fetal Intolerance     Additional complications:          Cervical ripening:                     Delivery:      Episiotomy:       Indication for Episiotomy:       Perineal Lacerations:   Repaired:      Periurethral Laceration:   Repaired:     Labial Laceration:   Repaired:     Sulcus Laceration:   Repaired:     Vaginal Laceration:   Repaired:     Cervical Laceration:   Repaired:     Repair suture:       Repair # of packets:       Last Value - EBL - Nursing (mL):       Sum - EBL - Nursing (mL): 0     Last Value - EBL - Anesthesia (mL):      Calculated QBL (mL): 820      Vaginal Sweep Performed:       Surgicount Correct:       Vaginal Packing:   Quantity:       Other providers:       Anesthesia    Method: Epidural          Details (if applicable):  Trial of Labor Yes    Categorization: Primary    Priority: Urgent   Indications for : Fetal heart rate abnormalities   Incision Type: low transverse     Additional  information:  Forceps:    Vacuum:    Breech:    Observed anomalies    Other (Comments):

## 2024-01-14 NOTE — TRANSFER OF CARE
Anesthesia Transfer of Care Note    Patient: Gypsy Rivera    Procedure(s) Performed: * No procedures listed *    Patient location: Labor and Delivery    Anesthesia Type: spinal    Transport from OR: Transported from OR on room air with adequate spontaneous ventilation    Post pain: adequate analgesia    Post assessment: no apparent anesthetic complications    Post vital signs: stable    Level of consciousness: awake and alert    Nausea/Vomiting: no nausea/vomiting    Complications: none    Transfer of care protocol was followed      Last vitals: Visit Vitals  BP (!) 140/90   Pulse 80   Temp 36.8 °C (98.3 °F) (Oral)   Resp 18   LMP 04/25/2023   SpO2 97%   Breastfeeding No

## 2024-01-14 NOTE — PROGRESS NOTES
LABOR NOTE    S:  Complaints: No.  Epidural working:  yes      O: /84   Pulse 62   Temp 98.9 °F (37.2 °C) (Oral)   Resp 14   LMP 2023   SpO2 99%   Breastfeeding No       FHT: 130 modBTBV +accels +intermittent variables Cat 1 (reassuring)  CTX: q 2-3 minutes  SVE: /-2 IUPC place, pit @ 16 mU/min      ASSESSMENT:   33 y.o.  at 37w2d, IOL    FHT reassuring    Active Hospital Problems    Diagnosis  POA    *Encounter for induction of labor [Z34.90]  Not Applicable    Susceptible to varicella (non-immune), currently pregnant [O09.899, Z28.39]  Yes    Rubella non-immune status, antepartum [O09.899, Z28.39]  Not Applicable    GERD (gastroesophageal reflux disease) [K21.9]  Yes    Gestational hypertension, third trimester [O13.3]  Yes    Anxiety [F41.9]  Yes    Medication exposure during first trimester of pregnancy [O09.891]  Not Applicable    Tachycardia [R00.0]  Yes    Nephrolithiasis [N20.0]  Yes      Resolved Hospital Problems   No resolved problems to display.   1115: 60/-3  1500: 70/-2 AROM cl   1915: 70/-2 IUPC placed pit @ 16 mU/min    PLAN:    Continue Close Maternal/Fetal Monitoring  Pitocin Augmentation per protocol  Recheck 4 hours or PRN    Katherine Boecking MD   Ob/Gyn PGY-4

## 2024-01-14 NOTE — PROGRESS NOTES
LABOR NOTE    S:  Complaints: No.  Epidural working:  yes  Resident to bedside for prolonged decel to 80s. Patient noted to be tachysystole. Dr. Oscar at bedside. Pitocin previously at 12 mU/min, paused and fluid bolus given. Maternal repositioning continued, terbulatine called to room and given. Return to baseline after 10 minutes, now 130s moderate variability, +accels.     O: BP (!) 143/86   Pulse 61   Temp 98.1 °F (36.7 °C) (Oral)   Resp 18   LMP 2023   SpO2 97%   Breastfeeding No       FHT: 130 modBTBV +accels +above mention decel, now Cat 1  CTX: initially q1min, now q2-3 minutes  SVE: /-2 IUPC place, pit @ 16 mU/min and pausedd      ASSESSMENT:   33 y.o.  at 37w2d, IOL    FHT reassuring    Active Hospital Problems    Diagnosis  POA    *Encounter for induction of labor [Z34.90]  Not Applicable    Susceptible to varicella (non-immune), currently pregnant [O09.899, Z28.39]  Yes    Rubella non-immune status, antepartum [O09.899, Z28.39]  Not Applicable    GERD (gastroesophageal reflux disease) [K21.9]  Yes    Gestational hypertension, third trimester [O13.3]  Yes    Anxiety [F41.9]  Yes    Medication exposure during first trimester of pregnancy [O09.891]  Not Applicable    Tachycardia [R00.0]  Yes    Nephrolithiasis [N20.0]  Yes      Resolved Hospital Problems   No resolved problems to display.   1115: 60/-3  1500: /-2 AROM cl   1915: /-2 IUPC placed pit @ 16 mU/min  2230: /-2 pitocin paused d/t prolonged decel    PLAN:    Continue Close Maternal/Fetal Monitoring  Pitocin Augmentation per protocol  Recheck 2 hours or PRN    Katherine Boecking MD   Ob/Gyn PGY-4

## 2024-01-14 NOTE — PROGRESS NOTES
LABOR NOTE    S:  Complaints: No.  Epidural working:  yes    O: /85   Pulse 75   Temp 98 °F (36.7 °C) (Oral)   Resp 18   LMP 2023   SpO2 95%   Breastfeeding No       FHT: 120 modBTBV +accels +decels after practice pushing, return to baseline Cat 2 reassuring  CTX:q1-2 min  SVE: 9.5/90/-1 pit @ 4 mU/min      ASSESSMENT:   33 y.o.  at 37w2d, IOL    FHT reassuring    Active Hospital Problems    Diagnosis  POA    *Encounter for induction of labor [Z34.90]  Not Applicable    Susceptible to varicella (non-immune), currently pregnant [O09.899, Z28.39]  Yes    Rubella non-immune status, antepartum [O09.899, Z28.39]  Not Applicable    GERD (gastroesophageal reflux disease) [K21.9]  Yes    Gestational hypertension, third trimester [O13.3]  Yes    Anxiety [F41.9]  Yes    Medication exposure during first trimester of pregnancy [O09.891]  Not Applicable    Tachycardia [R00.0]  Yes    Nephrolithiasis [N20.0]  Yes      Resolved Hospital Problems   No resolved problems to display.   1115: 1/60/-3  1500: 4/70/-2 AROM cl   1915: 4/70/-2 IUPC placed pit @ 16 mU/min  2230: 6/80/-2 pitocin paused d/t prolonged decel  0120: 8/90/-1, pit @ 6 mU/min  0320: 9.5/90/-1 pit @ 4 mU/min    Will sit straight up. Recheck 1 hour    PLAN:    Continue Close Maternal/Fetal Monitoring  Pitocin Augmentation per protocol    Katherine Boecking MD   Ob/Gyn PGY-4

## 2024-01-14 NOTE — PLAN OF CARE
VSS. Pain controlled with oral and IV pain medication. Breastfeeding and pumping. Fundus firm and midline with moderate lochia rubra. LTV dressing in place, clean/dry/intact. Ambulating independently. Danielle catheter removed, DTV by 2230.  No concerns at this time.

## 2024-01-14 NOTE — PROGRESS NOTES
LABOR NOTE    S:  Complaints: No.  Epidural working:  yes    O: /87   Pulse 72   Temp 98.1 °F (36.7 °C) (Oral)   Resp 18   LMP 2023   SpO2 (!) 94%   Breastfeeding No       FHT: 120 modBTBV +accels - decels Cat 2 reassuring  CTX:q1-2 min  SVE: 8/-1 pit @ 6 mU/min      ASSESSMENT:   33 y.o.  at 37w2d, IOL    FHT reassuring    Active Hospital Problems    Diagnosis  POA    *Encounter for induction of labor [Z34.90]  Not Applicable    Susceptible to varicella (non-immune), currently pregnant [O09.899, Z28.39]  Yes    Rubella non-immune status, antepartum [O09.899, Z28.39]  Not Applicable    GERD (gastroesophageal reflux disease) [K21.9]  Yes    Gestational hypertension, third trimester [O13.3]  Yes    Anxiety [F41.9]  Yes    Medication exposure during first trimester of pregnancy [O09.891]  Not Applicable    Tachycardia [R00.0]  Yes    Nephrolithiasis [N20.0]  Yes      Resolved Hospital Problems   No resolved problems to display.   1115: 60/-3  1500: 470/-2 AROM cl   1915: 470/-2 IUPC placed pit @ 16 mU/min  2230: 80/-2 pitocin paused d/t prolonged decel  0120: 890/-1, pit @ 6 mU/min    PLAN:    Continue Close Maternal/Fetal Monitoring  Pitocin Augmentation per protocol  Recheck 2 hours or PRN    Katherine Boecking MD   Ob/Gyn PGY-4

## 2024-01-15 LAB
BASOPHILS # BLD AUTO: 0.05 K/UL (ref 0–0.2)
BASOPHILS NFR BLD: 0.4 % (ref 0–1.9)
DIFFERENTIAL METHOD BLD: ABNORMAL
EOSINOPHIL # BLD AUTO: 0 K/UL (ref 0–0.5)
EOSINOPHIL NFR BLD: 0.2 % (ref 0–8)
ERYTHROCYTE [DISTWIDTH] IN BLOOD BY AUTOMATED COUNT: 14 % (ref 11.5–14.5)
HCT VFR BLD AUTO: 25.9 % (ref 37–48.5)
HGB BLD-MCNC: 8.7 G/DL (ref 12–16)
IMM GRANULOCYTES # BLD AUTO: 0.11 K/UL (ref 0–0.04)
IMM GRANULOCYTES NFR BLD AUTO: 0.9 % (ref 0–0.5)
LYMPHOCYTES # BLD AUTO: 1.3 K/UL (ref 1–4.8)
LYMPHOCYTES NFR BLD: 10.7 % (ref 18–48)
MCH RBC QN AUTO: 31.6 PG (ref 27–31)
MCHC RBC AUTO-ENTMCNC: 33.6 G/DL (ref 32–36)
MCV RBC AUTO: 94 FL (ref 82–98)
MONOCYTES # BLD AUTO: 0.8 K/UL (ref 0.3–1)
MONOCYTES NFR BLD: 6.9 % (ref 4–15)
NEUTROPHILS # BLD AUTO: 9.6 K/UL (ref 1.8–7.7)
NEUTROPHILS NFR BLD: 80.9 % (ref 38–73)
NRBC BLD-RTO: 0 /100 WBC
PLATELET # BLD AUTO: 128 K/UL (ref 150–450)
PMV BLD AUTO: 12.9 FL (ref 9.2–12.9)
RBC # BLD AUTO: 2.75 M/UL (ref 4–5.4)
WBC # BLD AUTO: 11.81 K/UL (ref 3.9–12.7)

## 2024-01-15 PROCEDURE — 63600175 PHARM REV CODE 636 W HCPCS: Performed by: STUDENT IN AN ORGANIZED HEALTH CARE EDUCATION/TRAINING PROGRAM

## 2024-01-15 PROCEDURE — 25000003 PHARM REV CODE 250

## 2024-01-15 PROCEDURE — 25000003 PHARM REV CODE 250: Performed by: STUDENT IN AN ORGANIZED HEALTH CARE EDUCATION/TRAINING PROGRAM

## 2024-01-15 PROCEDURE — 85025 COMPLETE CBC W/AUTO DIFF WBC: CPT | Performed by: STUDENT IN AN ORGANIZED HEALTH CARE EDUCATION/TRAINING PROGRAM

## 2024-01-15 PROCEDURE — 11000001 HC ACUTE MED/SURG PRIVATE ROOM

## 2024-01-15 PROCEDURE — 99024 POSTOP FOLLOW-UP VISIT: CPT | Mod: ,,, | Performed by: STUDENT IN AN ORGANIZED HEALTH CARE EDUCATION/TRAINING PROGRAM

## 2024-01-15 PROCEDURE — 36415 COLL VENOUS BLD VENIPUNCTURE: CPT | Performed by: STUDENT IN AN ORGANIZED HEALTH CARE EDUCATION/TRAINING PROGRAM

## 2024-01-15 RX ORDER — IBUPROFEN 600 MG/1
600 TABLET ORAL EVERY 6 HOURS
Qty: 30 TABLET | Refills: 1 | Status: SHIPPED | OUTPATIENT
Start: 2024-01-16 | End: 2024-02-23

## 2024-01-15 RX ORDER — LANOLIN ALCOHOL/MO/W.PET/CERES
1 CREAM (GRAM) TOPICAL DAILY
Status: DISCONTINUED | OUTPATIENT
Start: 2024-01-16 | End: 2024-01-17 | Stop reason: HOSPADM

## 2024-01-15 RX ORDER — DOCUSATE SODIUM 100 MG/1
200 CAPSULE, LIQUID FILLED ORAL 2 TIMES DAILY
Qty: 30 CAPSULE | Refills: 1 | Status: SHIPPED | OUTPATIENT
Start: 2024-01-15 | End: 2024-02-23

## 2024-01-15 RX ORDER — OXYCODONE HYDROCHLORIDE 5 MG/1
5 CAPSULE ORAL EVERY 4 HOURS PRN
Qty: 15 CAPSULE | Refills: 0 | Status: SHIPPED | OUTPATIENT
Start: 2024-01-15 | End: 2024-02-23

## 2024-01-15 RX ORDER — DEXTROMETHORPHAN HYDROBROMIDE, GUAIFENESIN 5; 100 MG/5ML; MG/5ML
650 LIQUID ORAL EVERY 8 HOURS
Qty: 30 TABLET | Refills: 1 | Status: SHIPPED | OUTPATIENT
Start: 2024-01-15 | End: 2024-02-23

## 2024-01-15 RX ORDER — FERROUS SULFATE 325(65) MG
325 TABLET, DELAYED RELEASE (ENTERIC COATED) ORAL DAILY
Qty: 30 TABLET | Refills: 1 | Status: SHIPPED | OUTPATIENT
Start: 2024-01-15 | End: 2024-02-23

## 2024-01-15 RX ADMIN — IBUPROFEN 600 MG: 600 TABLET, FILM COATED ORAL at 11:01

## 2024-01-15 RX ADMIN — SENNOSIDES AND DOCUSATE SODIUM 1 TABLET: 8.6; 5 TABLET ORAL at 05:01

## 2024-01-15 RX ADMIN — FAMOTIDINE 20 MG: 20 TABLET ORAL at 10:01

## 2024-01-15 RX ADMIN — PROPRANOLOL HYDROCHLORIDE 10 MG: 10 TABLET ORAL at 09:01

## 2024-01-15 RX ADMIN — KETOROLAC TROMETHAMINE 30 MG: 30 INJECTION, SOLUTION INTRAMUSCULAR; INTRAVENOUS at 05:01

## 2024-01-15 RX ADMIN — FAMOTIDINE 20 MG: 20 TABLET ORAL at 09:01

## 2024-01-15 RX ADMIN — IBUPROFEN 600 MG: 600 TABLET, FILM COATED ORAL at 05:01

## 2024-01-15 RX ADMIN — OXYCODONE AND ACETAMINOPHEN 1 TABLET: 10; 325 TABLET ORAL at 06:01

## 2024-01-15 RX ADMIN — PROCHLORPERAZINE EDISYLATE 5 MG: 5 INJECTION INTRAMUSCULAR; INTRAVENOUS at 08:01

## 2024-01-15 RX ADMIN — DOCUSATE SODIUM 200 MG: 100 CAPSULE, LIQUID FILLED ORAL at 10:01

## 2024-01-15 RX ADMIN — PROPRANOLOL HYDROCHLORIDE 10 MG: 10 TABLET ORAL at 10:01

## 2024-01-15 RX ADMIN — DOCUSATE SODIUM 200 MG: 100 CAPSULE, LIQUID FILLED ORAL at 09:01

## 2024-01-15 RX ADMIN — ACETAMINOPHEN 650 MG: 325 TABLET ORAL at 05:01

## 2024-01-15 RX ADMIN — DULOXETINE HYDROCHLORIDE 60 MG: 30 CAPSULE, DELAYED RELEASE ORAL at 10:01

## 2024-01-15 RX ADMIN — PRENATAL VIT W/ FE FUMARATE-FA TAB 27-0.8 MG 1 TABLET: 27-0.8 TAB at 09:01

## 2024-01-15 NOTE — PLAN OF CARE
VSS. Ambulating and voiding without difficulty. Pain well controlled with oral pain meds. C/s dressing clean, dry and intact with no signs of infection. Significant other at bedside and assisting in patient's care. Baby in NICU; Pt is pumping frequently and bringing colostrum to NICU. Patient safety maintained, side rails up, bed low and locked position. Will continue to round as needed.

## 2024-01-15 NOTE — LACTATION NOTE
LC provided Pt education on behaviors of near term babies and encouraged Pt to feed on cue or at least every three hours. Lactation Basics education completed. LC reviewed Breastfeeding Guide and encouraged tracking feeds and output. Encouraged use of STS, frequent feeds based on baby's cues or at least every three hours, and avoiding artificial nipples. Baby sleepy and requiring supplementation. LC provided Pt education on use and maintenance of Medela [employer] breast. LC encouraged Pt to feed on cue or at least every three hours; Pt to pump and supplement after each feeding. Pt aware that plan of care can be reassessed. Pt verbalized understanding and questions answered. Pt aware to call LC for assistance with feeding.

## 2024-01-15 NOTE — PLAN OF CARE
VSS. Pain controlled with scheduled oral pain medication. Pumping for infant in NICU. Fundus firm and midline with light lochia rubra. LTV c/s dressing in place w/ a moderate amount of dried drainage noted and marked. Voiding spontaneously with adequate output. Passing gas. Visits NICU frequently. No concerns at this time.

## 2024-01-15 NOTE — PROGRESS NOTES
POSTPARTUM PROGRESS NOTE    Subjective:     PPD/POD#: 1   Procedure: Primary LTCS (NRFHTs)   EGA: 37w3d   N/V: No   F/C: No   Abd Pain: Mild, well-controlled with oral pain medication   Lochia: Mild   Voiding: Yes   Ambulating: Yes   Bowel fnc: Yes   Contraception: Continue discussing      Objective:      Temp:  [97.2 °F (36.2 °C)-98.1 °F (36.7 °C)] 97.8 °F (36.6 °C)  Pulse:  [] 76  Resp:  [16-20] 18  SpO2:  [94 %-100 %] 96 %  BP: (111-145)/(65-93) 120/73    Abdomen: Soft, appropriately tender   Uterus: Firm, no fundal tenderness   Incision: Pressure dressing removed. Bandage in place with some shadowing     Lab Review    Recent Labs   Lab 01/09/24  1640 01/13/24  1031   * 136   K 3.5 3.8    107   CO2 17* 19*   BUN 7 6   CREATININE 0.7 0.7   GLU 84 95   PROT 6.0 6.2   BILITOT 0.3 0.3   ALKPHOS 114 122   ALT 7* 7*   AST 15 15       Recent Labs   Lab 01/09/24  1640 01/13/24  1031   WBC 8.28 7.12   HGB 11.2* 11.9*   HCT 33.0* 35.7*   MCV 91 91    158         I/O    Intake/Output Summary (Last 24 hours) at 1/15/2024 0441  Last data filed at 1/14/2024 1900  Gross per 24 hour   Intake 1145.56 ml   Output 1935 ml   Net -789.44 ml        Assessment and Plan:   Postpartum care:  - Patient doing well.  - Continue routine management and advances.    gHTN  - BP as above  - asymptomatic  - preE labs as above  - UOP: 0.54 cc/kg/hr  - Mag: not indicated  - Hypertensive agent Propanolol     Nephrolithiasis    Mood Disorder  - Mood stable  - Medications: Cymbalta  - Will need 1-2 week postpartum mood check    Varicella and Rubella non immune  - Will need Varicella and MMR postpartum     GERD   - Pepcid 20 mg BID    Gardenia Del Cid MD  Obstetrics and Gynecology, PGY-1

## 2024-01-15 NOTE — ANESTHESIA POSTPROCEDURE EVALUATION
Anesthesia Post Evaluation    Patient: Gypsy Rivera    Procedure(s) Performed: Procedure(s) (LRB):   SECTION (N/A)    Final Anesthesia Type: epidural      Patient location during evaluation: labor & delivery  Patient participation: Yes- Able to Participate  Level of consciousness: awake and alert  Post-procedure vital signs: reviewed and stable  Pain management: adequate  Airway patency: patent  JOSE mitigation strategies: Multimodal analgesia  PONV status at discharge: No PONV  Anesthetic complications: no      Cardiovascular status: blood pressure returned to baseline, hemodynamically stable and stable  Respiratory status: unassisted, room air and spontaneous ventilation  Hydration status: euvolemic  Follow-up not needed.              Vitals Value Taken Time   /78 01/15/24 1255   Temp 36.7 °C (98 °F) 01/15/24 1255   Pulse 89 01/15/24 1255   Resp 18 01/15/24 1255   SpO2 98 % 01/15/24 1255         No case tracking events are documented in the log.      Pain/Rose Score: Pain Rating Prior to Med Admin: 4 (1/15/2024 11:11 AM)  Pain Rating Post Med Admin: 8 (2024  9:18 AM)

## 2024-01-16 PROBLEM — Z34.90 ENCOUNTER FOR INDUCTION OF LABOR: Status: RESOLVED | Noted: 2024-01-13 | Resolved: 2024-01-16

## 2024-01-16 PROBLEM — D62 ACUTE BLOOD LOSS ANEMIA: Status: ACTIVE | Noted: 2024-01-16

## 2024-01-16 PROCEDURE — 11000001 HC ACUTE MED/SURG PRIVATE ROOM

## 2024-01-16 PROCEDURE — 99024 POSTOP FOLLOW-UP VISIT: CPT | Mod: ,,, | Performed by: OBSTETRICS & GYNECOLOGY

## 2024-01-16 PROCEDURE — 25000003 PHARM REV CODE 250

## 2024-01-16 PROCEDURE — 25000003 PHARM REV CODE 250: Performed by: STUDENT IN AN ORGANIZED HEALTH CARE EDUCATION/TRAINING PROGRAM

## 2024-01-16 PROCEDURE — 63600175 PHARM REV CODE 636 W HCPCS: Performed by: GENERAL PRACTICE

## 2024-01-16 RX ORDER — ACETAMINOPHEN 500 MG
1000 TABLET ORAL EVERY 6 HOURS PRN
Status: DISCONTINUED | OUTPATIENT
Start: 2024-01-16 | End: 2024-01-17 | Stop reason: HOSPADM

## 2024-01-16 RX ORDER — DIPHENHYDRAMINE HYDROCHLORIDE 50 MG/ML
25 INJECTION INTRAMUSCULAR; INTRAVENOUS ONCE
Status: COMPLETED | OUTPATIENT
Start: 2024-01-16 | End: 2024-01-16

## 2024-01-16 RX ORDER — METOCLOPRAMIDE HYDROCHLORIDE 5 MG/ML
10 INJECTION INTRAMUSCULAR; INTRAVENOUS ONCE
Status: COMPLETED | OUTPATIENT
Start: 2024-01-16 | End: 2024-01-16

## 2024-01-16 RX ADMIN — PROPRANOLOL HYDROCHLORIDE 10 MG: 10 TABLET ORAL at 09:01

## 2024-01-16 RX ADMIN — DIPHENHYDRAMINE HYDROCHLORIDE 25 MG: 50 INJECTION, SOLUTION INTRAMUSCULAR; INTRAVENOUS at 04:01

## 2024-01-16 RX ADMIN — PROPRANOLOL HYDROCHLORIDE 10 MG: 10 TABLET ORAL at 08:01

## 2024-01-16 RX ADMIN — DOCUSATE SODIUM 200 MG: 100 CAPSULE, LIQUID FILLED ORAL at 09:01

## 2024-01-16 RX ADMIN — FAMOTIDINE 20 MG: 20 TABLET ORAL at 09:01

## 2024-01-16 RX ADMIN — FAMOTIDINE 20 MG: 20 TABLET ORAL at 08:01

## 2024-01-16 RX ADMIN — FERROUS SULFATE TAB 325 MG (65 MG ELEMENTAL FE) 1 EACH: 325 (65 FE) TAB at 08:01

## 2024-01-16 RX ADMIN — ONDANSETRON 8 MG: 8 TABLET, ORALLY DISINTEGRATING ORAL at 04:01

## 2024-01-16 RX ADMIN — IBUPROFEN 600 MG: 600 TABLET, FILM COATED ORAL at 01:01

## 2024-01-16 RX ADMIN — ACETAMINOPHEN 1000 MG: 500 TABLET ORAL at 07:01

## 2024-01-16 RX ADMIN — PRENATAL VIT W/ FE FUMARATE-FA TAB 27-0.8 MG 1 TABLET: 27-0.8 TAB at 07:01

## 2024-01-16 RX ADMIN — IBUPROFEN 600 MG: 600 TABLET, FILM COATED ORAL at 07:01

## 2024-01-16 RX ADMIN — METOCLOPRAMIDE 10 MG: 5 INJECTION, SOLUTION INTRAMUSCULAR; INTRAVENOUS at 04:01

## 2024-01-16 RX ADMIN — DOCUSATE SODIUM 200 MG: 100 CAPSULE, LIQUID FILLED ORAL at 08:01

## 2024-01-16 RX ADMIN — IBUPROFEN 600 MG: 600 TABLET, FILM COATED ORAL at 06:01

## 2024-01-16 RX ADMIN — DULOXETINE HYDROCHLORIDE 60 MG: 30 CAPSULE, DELAYED RELEASE ORAL at 09:01

## 2024-01-16 NOTE — PLAN OF CARE
VSS. Ambulating and voiding without difficulty. Pain well controlled with PRN pain meds. One episode of n/v after taking PRN percocet; pt reported relief with nausea med and after sleeping. C/s dressing dry and intact with no signs of infection. Significant other at bedside and assisting in patient's care. Baby in NICU; Pt going down to see baby often. She is pumping frequently and bringing colostrum to NICU. Patient safety maintained, side rails up, bed low and locked position. Will continue to round as needed.

## 2024-01-16 NOTE — LACTATION NOTE
Lactation Round: LC reviewed NICU lactation basics, including use of double electric breast pump.  ID stickers for bottles, and is aware how to store and transport milk. Reviewed cleaning and sanitization of pump parts. Pt expressed concern about milk supply; LC used NICU Lactation Booklet to review normal expectations for milk production when pumping for NICU baby. LC reviewed techniques to increase supply.  Pt aware of how to use NICView. All questions answered and pt verbalized understanding.

## 2024-01-16 NOTE — PLAN OF CARE
VSS. Pain controlled with oral and IV pain medication. Pumping for infant in NICU. Fundus firm and midline with light lochia rubra. LTV dressing in place, dried drainage marked. Voiding spontaneously with adequate output. Passing gas. No concerns at this time.

## 2024-01-16 NOTE — PHYSICIAN QUERY
PT Name: Gypsy Rivera  MR #: [id number]    DOCUMENTATION CLARIFICATION      CDS/: ANUPAMA Malone,RNC-MNN        Contact information:[email or web address]    This form is a permanent document in the medical record.      Query Date: 2024    By submitting this query, we are merely seeking further clarification of documentation. Please utilize your independent clinical judgment when addressing the question(s) below.    The Medical Record contains the following:   Indicators  Supporting Clinical Findings Location in Medical Record    Anemia documented     X H&H Recent Labs   Lab 24  1640 24  1031 01/15/24  0523   WBC 8.28 7.12 11.81   HGB 11.2* 11.9* 8.7*   HCT 33.0* 35.7* 25.9*   MCV 91 91 94    158 128*     OB Progress note @730am    BP                    HR      Bleeding     X Procedure/Surgery Performed/EBL URGENT Primary Low Transverse  Section via Pfannenstiel skin incision     Estimated Blood Loss:  820 mL  L&D Delivery note 1/15    Transfusion(s)     X Acute/Chronic illness s/p PLTCS 2/2 NRFHT c/b gHTN, nephrolithiasis, anxiety, GERD  OB Progress note @730am   X Treatments ferrous sulfate tablet 1 each Frequency: Daily  MAR 1/16    Other       Provider, please specify diagnosis or diagnoses associated with above clinical findings.   [   ] Acute blood loss anemia    [ x ] Acute blood loss anemia expected post-operatively    [   ] Iron deficiency anemia    [   ] Anemia, unspecified    [   ] Other : _________________   [   ] Clinically Undetermined     Please document in your progress notes daily for the duration of treatment, until resolved, and include in your discharge summary.    Form No. [id number]

## 2024-01-16 NOTE — LACTATION NOTE
This note was copied from a baby's chart.  Lactation to bedside:  Mom very comfortable/fairly independent with positioning; LC assisting in effectively latching infant deeper on to breast for feeding. Abelino is awake,eager to feed, with hands to mouth. He was able to effectively latch and do a few good tugs/pulls, but is easily frustrated,on/off the breast. Encouragement provided;assisted in obtaining a deeper latch and use of breast compression. Mom pumping regularly and obtaining colostrum/praised mom. Mom stopped session attempt and offered Debm by bottle (due to hypoglycemia history). We practiced and reviewed what an effective latch and successful breast feeding/milk transfer looks like. Mom encouraged to continue practicing 10min at the breast, allow Dad to bottle feed pumped/donor milk as ordered to maintain normo-glycemia and mom to pump after to stimulate/call in milk supply. Mom also highly encouraged to schedule an outpatient lactation consultation for guidance (along with pediatrician) to transition to more exclusive breast feeding, as this is mom's ultimate goal/plan. Infant may transfer back to MBU later today if blood sugar allows. Support provided.

## 2024-01-16 NOTE — PROGRESS NOTES
POSTPARTUM PROGRESS NOTE    Subjective:     POD#: 2   Procedure: Primary LTCS (NRFHTs)   EGA: 37w3d   N/V: No   F/C: No   Abd Pain: Mild, well-controlled with oral pain medication   Lochia: Mild   Voiding: Yes   Ambulating: Yes   Bowel fnc: Yes   Contraception: Continue discussing    Patient reports occasional headaches, not currently, and denies dizziness, visual changes, chest pain, shortness of breath, nausea or vomiting and right upper quadrant pain     Objective:      Temp:  [97.6 °F (36.4 °C)-98.3 °F (36.8 °C)] 98 °F (36.7 °C)  Pulse:  [77-89] 85  Resp:  [16-20] 16  SpO2:  [95 %-98 %] 97 %  BP: (102-132)/(65-86) 132/86    Abdomen: Soft, appropriately tender   Uterus: Firm, no fundal tenderness   Incision: Bandage in place with stable shadowing from yesterday.     Lab Review    Recent Labs   Lab 24  1640 24  1031   * 136   K 3.5 3.8    107   CO2 17* 19*   BUN 7 6   CREATININE 0.7 0.7   GLU 84 95   PROT 6.0 6.2   BILITOT 0.3 0.3   ALKPHOS 114 122   ALT 7* 7*   AST 15 15       Recent Labs   Lab 24  1640 24  1031 01/15/24  0523   WBC 8.28 7.12 11.81   HGB 11.2* 11.9* 8.7*   HCT 33.0* 35.7* 25.9*   MCV 91 91 94    158 128*         I/O  No intake or output data in the 24 hours ending 24 0727       Assessment and Plan:   32 yo  POD#2 s/p PLTCS 2/2 NRFHT c/b gHTN, nephrolithiasis, anxiety, GERD    Postpartum care  - Patient doing well and meeting appropriate milestones  - Ambulating, voiding, tolerating PO  - Pain controlled, percocet made her very nauseated, will attempt pain mgmt without narcotics today  - Baby in NICU   - Pumping  - Continue routine management and advances.    2.   gHTN  - BP as above  - asymptomatic  - preE labs as above  - UOP: 0.54 cc/kg/hr  - Mag: not indicated    3.   Hx Nephrolithiasis  - Asymptomatic    4.   Mood Disorder  - Mood stable  - Medications: Cymbalta  - Will need 1-2 week postpartum mood check    5.   Varicella and Rubella  non immune  - Will need Varicella and MMR postpartum     6.   GERD   - Pepcid 20 mg BID    Attending Attestation  I agree with the above edited resident note. Pt seen and examined, chart and labs reviewed.    Briefly, 34 yo  POD#2 s/p PLTCS (NRFHT) c/b gHTN. Asymptomatic, BP controlled w/o antihypertensives, Mg not indicated. Otherwise doing well.     All questions answered. Cont current plan of care    Viviana Mercer MD  OB Hospitalist  2024

## 2024-01-16 NOTE — NURSING
The following message was sent to dr welch's staff: Hi, can you please call ms darnell to set up a post partum bp&mood check appt. Thank you. Note: pt has a connected mom and will check bp daily at home.

## 2024-01-17 ENCOUNTER — TELEPHONE (OUTPATIENT)
Dept: OBSTETRICS AND GYNECOLOGY | Facility: CLINIC | Age: 34
End: 2024-01-17
Payer: COMMERCIAL

## 2024-01-17 VITALS
TEMPERATURE: 98 F | RESPIRATION RATE: 18 BRPM | OXYGEN SATURATION: 99 % | SYSTOLIC BLOOD PRESSURE: 143 MMHG | DIASTOLIC BLOOD PRESSURE: 87 MMHG | HEART RATE: 69 BPM

## 2024-01-17 PROCEDURE — 25000003 PHARM REV CODE 250: Performed by: STUDENT IN AN ORGANIZED HEALTH CARE EDUCATION/TRAINING PROGRAM

## 2024-01-17 PROCEDURE — 90471 IMMUNIZATION ADMIN: CPT | Performed by: STUDENT IN AN ORGANIZED HEALTH CARE EDUCATION/TRAINING PROGRAM

## 2024-01-17 PROCEDURE — 25000003 PHARM REV CODE 250

## 2024-01-17 PROCEDURE — 90710 MMRV VACCINE SC: CPT | Mod: JG | Performed by: STUDENT IN AN ORGANIZED HEALTH CARE EDUCATION/TRAINING PROGRAM

## 2024-01-17 PROCEDURE — 3E0134Z INTRODUCTION OF SERUM, TOXOID AND VACCINE INTO SUBCUTANEOUS TISSUE, PERCUTANEOUS APPROACH: ICD-10-PCS | Performed by: OBSTETRICS & GYNECOLOGY

## 2024-01-17 PROCEDURE — 63600175 PHARM REV CODE 636 W HCPCS: Mod: JG | Performed by: STUDENT IN AN ORGANIZED HEALTH CARE EDUCATION/TRAINING PROGRAM

## 2024-01-17 RX ADMIN — PRENATAL VIT W/ FE FUMARATE-FA TAB 27-0.8 MG 1 TABLET: 27-0.8 TAB at 08:01

## 2024-01-17 RX ADMIN — FAMOTIDINE 20 MG: 20 TABLET ORAL at 08:01

## 2024-01-17 RX ADMIN — MEASLES, MUMPS, AND RUBELLA VIRUS VACCINE LIVE 0.5 ML: 1000; 12500; 1000 INJECTION, POWDER, LYOPHILIZED, FOR SUSPENSION SUBCUTANEOUS at 11:01

## 2024-01-17 RX ADMIN — DOCUSATE SODIUM 200 MG: 100 CAPSULE, LIQUID FILLED ORAL at 08:01

## 2024-01-17 RX ADMIN — PROPRANOLOL HYDROCHLORIDE 10 MG: 10 TABLET ORAL at 08:01

## 2024-01-17 RX ADMIN — IBUPROFEN 600 MG: 600 TABLET, FILM COATED ORAL at 01:01

## 2024-01-17 RX ADMIN — IBUPROFEN 600 MG: 600 TABLET, FILM COATED ORAL at 06:01

## 2024-01-17 RX ADMIN — FERROUS SULFATE TAB 325 MG (65 MG ELEMENTAL FE) 1 EACH: 325 (65 FE) TAB at 08:01

## 2024-01-17 NOTE — LACTATION NOTE
Attempted to visit patient in room, parents resting, patient declined education review and assistance washing collection kit.  Stated father of the baby would wash the collection kit later.  Encouraged to call for assistance prn.

## 2024-01-17 NOTE — TELEPHONE ENCOUNTER
Called pt to follow up in regards to scheduling bp and mood check    Pt agreed to be seen on Thursday 01-25-24 at [facility] for 10 am   Will maintain bp log    No further questions    ND

## 2024-01-17 NOTE — PLAN OF CARE
VSS. MMR vaccine given. Pain controlled with scheduled oral pain medication. Pt breastfeeding/pumping and bottle feeding infant with donor milk and EBM. Fundus firm and midline with light lochia rubra. LTV dressing in place, moderate dried drainage marked. No additional drainage noted. Discharge orders in per OB, discharge instructions reviewed with pt. Pt to follow up with OB in one week for blood pressure check. No concerns at this time.

## 2024-01-17 NOTE — DISCHARGE SUMMARY
Delivery Discharge Summary  Obstetrics    Primary OB Clinician: Kasey Oscar MD     Admission date: 2024  Discharge date: 2024    Disposition: To home, self care    Discharge Diagnosis List:      Patient Active Problem List   Diagnosis    Kidney stones    Lumbar strain    Nephrolithiasis    Nasal deformity    Medication exposure during first trimester of pregnancy    Tachycardia    Anxiety    ADHD    History of back surgery    31 weeks gestation of pregnancy    Susceptible to varicella (non-immune), currently pregnant    Rubella non-immune status, antepartum    GERD (gastroesophageal reflux disease)    Gestational hypertension, third trimester     delivery delivered    Acute blood loss anemia     Procedure: , due to non-reassuring fetal heart tones    Hospital Course:  Gypsy Rivera is a 33 y.o. now , POD #3 who was admitted on 2024 at 36w6d for induction of labor secondary to gHTN. Patient was subsequently admitted to labor and delivery unit with signed consents.     Patient has a past medical history of anxiety and currently takes Cymbalta and Propanolol. Will need 1 week postpartum mood check.    Patient was completely dilated and set up for pushing, however after pushing with two contractions FHT noted to be in the 60s. Pitocin was paused, fluid bolus given and maternal repositioning performed without return to baseline; after approximately 6 minutes decision was made to roll to the OR for STAT  section.  section was performed without complications.    Please see delivery note for further details. Her postpartum course was uncomplicated. She did not require initiation of antihypertensive medications while admitted. On discharge day, patient's pain is controlled with oral pain medications. Patient received MMR vaccine and Varicella vaccine in the postpartum period. Pt is tolerating ambulation without SOB or CP, and regular diet without N/V.  "Reports lochia is mild. Denies any HA, vision changes, F/C, LE swelling. Denies any breast pain/soreness.    Pt in stable condition and ready for discharge. She has been instructed to start and/or continue medications and follow up with her obstetrics provider as listed below.    Temp:  [97.5 °F (36.4 °C)-98.3 °F (36.8 °C)] 97.9 °F (36.6 °C)  Pulse:  [63-89] 63  Resp:  [16-20] 18  SpO2:  [97 %-100 %] 99 %  BP: (120-148)/(78-90) 146/90    Abdomen: Soft, appropriately tender   Uterus: Firm, no fundal tenderness   Incision: Bandage in place with moderate shadowing; one section abuts the lower border but bandage to be removed tomorrow     Pertinent studies:  CBC  Recent Labs   Lab 24  1031 01/15/24  0523   WBC 7.12 11.81   HGB 11.9* 8.7*   HCT 35.7* 25.9*   MCV 91 94    128*        Immunization History   Administered Date(s) Administered    Influenza - Quadrivalent - PF *Preferred* (6 months and older) 2023    Tdap 2023        Delivery:    Episiotomy: None   Lacerations: None   Repair suture: None   Repair # of packets:     Blood loss (ml):       Birth information:  YOB: 2023   Time of birth: 5:14 AM   Sex: male   Delivery type: , Low Transverse   Gestational Age: 37w3d     Measurements    Weight: 2690 g  Weight (lbs): 5 lb 14.9 oz  Length: 48.9 cm  Length (in): 19.25"  Head circumference: 33.7 cm  Chest circumference: 30.5 cm         Delivery Clinician: Delivery Providers    Delivering clinician: Kasey Oscar MD   Provider Role    Dee Dee Ovalle MD Resident    Princess Russell RN Circulator    Lala Gonzalez RN Charge Nurse    A[facility] Erickson [facility]             Additional  information:  Forceps:    Vacuum:    Breech:    Observed anomalies      Living?:     Apgars    Living status: Living  Apgar Component Scores:  1 min.:  5 min.:  10 min.:  15 min.:  20 min.:    Skin color:  0  1       Heart rate:  1  2       Reflex irritability:  1  1       Muscle tone:  " 1  2       Respiratory effort:  1  1       Total:  4  7       Apgars assigned by: NICU         Placenta: Delivered:       appearance    Patient Instructions:   Current Discharge Medication List        START taking these medications    Details   acetaminophen (TYLENOL) 650 MG TbSR Take 1 tablet (650 mg total) by mouth every 8 (eight) hours.  Qty: 30 tablet, Refills: 1      docusate sodium (COLACE) 100 MG capsule Take 2 capsules (200 mg total) by mouth 2 (two) times daily.  Qty: 30 capsule, Refills: 1      ferrous sulfate 325 (65 FE) MG EC tablet Take 1 tablet (325 mg total) by mouth once daily.  Qty: 30 tablet, Refills: 1      ibuprofen (ADVIL,MOTRIN) 600 MG tablet Take 1 tablet (600 mg total) by mouth every 6 (six) hours.  Qty: 30 tablet, Refills: 1      oxyCODONE (OXY-IR) 5 mg Cap Take 1 capsule (5 mg total) by mouth every 4 (four) hours as needed for Pain.  Qty: 15 capsule, Refills: 0    Comments: Quantity prescribed more than 7 day supply? No           CONTINUE these medications which have NOT CHANGED    Details   cetirizine (ZYRTEC) 10 MG tablet Take 10 mg by mouth once daily.      dextroamphetamine-amphetamine (ADDERALL XR) 30 MG 24 hr capsule Take 1 capsule (30 mg total) by mouth every morning.  Qty: 30 capsule, Refills: 0    Associated Diagnoses: Attention deficit hyperactivity disorder (ADHD), unspecified ADHD type      DULoxetine (CYMBALTA) 60 MG capsule Take 120 mg by mouth.      famotidine (PEPCID) 20 MG tablet Take 1 tablet (20 mg total) by mouth 2 (two) times daily.  Qty: 60 tablet, Refills: 1      prenatal 25/iron fum/folic/dha (PRENATAL-1 ORAL) Take by mouth.      propranolol (INDERAL) 10 MG tablet Take 10 mg by mouth daily as needed.           STOP taking these medications       diphenhydramine HCl (UNISOM, DIPHENHYDRAMINE, ORAL) Comments:   Reason for Stopping:         pyridoxine HCl, vitamin B6, (VITAMIN B-6 ORAL) Comments:   Reason for Stopping:               Discharge Procedure Orders   Diet  Adult Regular     Diet Adult Regular     No driving until:   Order Comments: No driving until not taking narcotic pain medication.     Pelvic Rest   Order Comments: Pelvic rest until 6 weeks after discharge. Nothing in vagina -no sex, tampons, douching, etc.     Notify your health care provider if you experience any of the following:  temperature >100.4     Notify your health care provider if you experience any of the following:  persistent nausea and vomiting or diarrhea     Notify your health care provider if you experience any of the following:  severe uncontrolled pain     Notify your health care provider if you experience any of the following:  redness, tenderness, or signs of infection (pain, swelling, redness, odor or green/yellow discharge around incision site)     Notify your health care provider if you experience any of the following:  difficulty breathing or increased cough     Notify your health care provider if you experience any of the following:  severe persistent headache     Notify your health care provider if you experience any of the following:  worsening rash     Notify your health care provider if you experience any of the following:  persistent dizziness, light-headedness, or visual disturbances     Notify your health care provider if you experience any of the following:  increased confusion or weakness     Notify your health care provider if you experience any of the following:   Order Comments: Heavy vaginal bleeding saturating more than 1 pad per hr for at least consecutive 2 hrs.     Lifting restrictions   Order Comments: No lifting anything larger than baby for 6 weeks     No driving until:   Order Comments: No driving while taking narcotics     Pelvic Rest   Order Comments: Until seen in clinic     Notify your health care provider if you experience any of the following:  temperature >100.4     Notify your health care provider if you experience any of the following:  persistent nausea and  vomiting or diarrhea     Notify your health care provider if you experience any of the following:  severe uncontrolled pain     Notify your health care provider if you experience any of the following:  redness, tenderness, or signs of infection (pain, swelling, redness, odor or green/yellow discharge around incision site)     Notify your health care provider if you experience any of the following:  difficulty breathing or increased cough     Notify your health care provider if you experience any of the following:  severe persistent headache     Notify your health care provider if you experience any of the following:  persistent dizziness, light-headedness, or visual disturbances     Activity as tolerated        Follow-up Information       Kasey Oscar MD Follow up in 1 week(s).    Specialty: Obstetrics and Gynecology  Why: Blood pressure check - or send BP on [facility] from home BP cuff within 1 week of discharge  Contact information:  [address] [place], [place] [id number] [phone]               Kasey Oscar MD Follow up in 6 week(s).    Specialty: Obstetrics and Gynecology  Why: Post partum visit  Contact information:  [address] [place], [place] [id number] [phone]               Kasey Oscar MD Follow up in 1 week(s).    Specialty: Obstetrics and Gynecology  Why: Postpartum mood check  Contact information:  [address] [place], [place] [id number] [phone]                              Tho Gonzalez MD MS  OB/Gyn  PGY-1

## 2024-01-17 NOTE — PLAN OF CARE
Overnight, AVSS. Ambulating and voiding independently without difficulty. Fundus is firm, midline and with light lochia.  Pain well controlled with scheduled meds. Hydrocolloid dressing over incision site clean, dry, and intact. Pumping successfully. Appropriate bonding with baby. Safety maintained.

## 2024-01-17 NOTE — TELEPHONE ENCOUNTER
----- Message from Mary Leahy RN sent at 1/16/2024 12:53 PM CST -----  Hi, can you please call ms darnell to set up a post partum bp&mood check appt. Thank you. Note: pt has a connected mom and will check bp daily at home.

## 2024-01-17 NOTE — LACTATION NOTE
01/17/24 1230   Maternal Assessment   Breast Shape Bilateral:;round   Breast Density Bilateral:;filling   Areola Bilateral:;elastic   Nipples Bilateral:;everted   Maternal Infant Feeding   Maternal Preparation hand hygiene;breast care   Maternal Emotional State independent   Latch Assistance no   Equipment Type   Breast Pump Type double electric, hospital grade;double electric, personal   Breast Pump Flange Type hard   Breast Pump Flange Size 21 mm   Breast Pumping   Breast Pumping Interventions frequent pumping encouraged;post-feed pumping encouraged   Breast Pumping double electric breast pump utilized   Community Referrals   Community Referrals outpatient lactation program;pediatric care provider;public health department;support group     LC to room:  Discharge education provided utilizing Breastfeeding guide handout. Feeding on cue, frequency and duration reviewed, intake amount and diaper counts expected on current day of life up to day 6 reviewed, engorgement prevention and relief measures reviewed. Pump information reviewed, client has a double electric Spectra pump at home via insurance. Community resources, risk hotline, and warmline extension provided.   Discharge education provided for pumping schedule. Pumping schedule frequency and duration reviewed, amount expected on current day, cleaning and sterilizing pump parts, milk handling, labeling, storage, and transport reviewed.    JUAN C discussed a formula feeding guide in case additional supplementation needed at home and encouraged client to review information with MBU RN. Client verbalized understanding.    Extension on whiteboard, all questions answered, client and family verbalized understanding.  Discharge education complete. MBU RN notified

## 2024-01-18 ENCOUNTER — PATIENT MESSAGE (OUTPATIENT)
Dept: OBSTETRICS AND GYNECOLOGY | Facility: OTHER | Age: 34
End: 2024-01-18
Payer: COMMERCIAL

## 2024-01-19 ENCOUNTER — TELEPHONE (OUTPATIENT)
Dept: OBSTETRICS AND GYNECOLOGY | Facility: CLINIC | Age: 34
End: 2024-01-19

## 2024-01-19 NOTE — TELEPHONE ENCOUNTER
----- Message from Kasey Oscar MD sent at 1/19/2024  1:13 PM CST -----  Jack Calero,  Would you be able to please call and check in on this patient? Elevated BP on ConnectedMOM.  Please provide OB ED precautions. She delivered.    Can we also move up her apt? She should come in for a BP check on Monday or Tuesday. Can be with me or julienne or lisa. Thank you!      1/19/24 @ 1918 Called pt as requested d/t elevated post partum BP. Pt denies the following: Visual changes, dizziness, SOB, Chest or abd pain, swelling headache not relieved by pain medications ( pt does state she had a slight headache yesterday, none since). Pt instruct if her BP stays elevated and or she develops any of the above symptoms she needs to go to [facility] to the OB ED for evaluation. Pt scheduled for BP check Monday 1/22 at 1300 with SABRINA Hamm at [facility]. Pt verbalizes understanding.

## 2024-01-22 ENCOUNTER — LAB VISIT (OUTPATIENT)
Dept: LAB | Facility: OTHER | Age: 34
End: 2024-01-22
Payer: COMMERCIAL

## 2024-01-22 ENCOUNTER — TELEPHONE (OUTPATIENT)
Dept: OBSTETRICS AND GYNECOLOGY | Facility: CLINIC | Age: 34
End: 2024-01-22
Payer: COMMERCIAL

## 2024-01-22 ENCOUNTER — POSTPARTUM VISIT (OUTPATIENT)
Dept: OBSTETRICS AND GYNECOLOGY | Facility: CLINIC | Age: 34
End: 2024-01-22
Payer: COMMERCIAL

## 2024-01-22 VITALS
WEIGHT: 156.31 LBS | BODY MASS INDEX: 26.69 KG/M2 | DIASTOLIC BLOOD PRESSURE: 100 MMHG | HEIGHT: 64 IN | SYSTOLIC BLOOD PRESSURE: 140 MMHG

## 2024-01-22 LAB
ALBUMIN SERPL BCP-MCNC: 3 G/DL (ref 3.5–5.2)
ALP SERPL-CCNC: 218 U/L (ref 55–135)
ALT SERPL W/O P-5'-P-CCNC: 24 U/L (ref 10–44)
ANION GAP SERPL CALC-SCNC: 5 MMOL/L (ref 8–16)
AST SERPL-CCNC: 18 U/L (ref 10–40)
BASOPHILS # BLD AUTO: 0.03 K/UL (ref 0–0.2)
BASOPHILS NFR BLD: 0.4 % (ref 0–1.9)
BILIRUB SERPL-MCNC: 0.3 MG/DL (ref 0.1–1)
BUN SERPL-MCNC: 9 MG/DL (ref 6–20)
CALCIUM SERPL-MCNC: 8.9 MG/DL (ref 8.7–10.5)
CHLORIDE SERPL-SCNC: 106 MMOL/L (ref 95–110)
CO2 SERPL-SCNC: 26 MMOL/L (ref 23–29)
CREAT SERPL-MCNC: 0.8 MG/DL (ref 0.5–1.4)
DIFFERENTIAL METHOD BLD: ABNORMAL
EOSINOPHIL # BLD AUTO: 0.1 K/UL (ref 0–0.5)
EOSINOPHIL NFR BLD: 0.9 % (ref 0–8)
ERYTHROCYTE [DISTWIDTH] IN BLOOD BY AUTOMATED COUNT: 13.5 % (ref 11.5–14.5)
EST. GFR  (NO RACE VARIABLE): >60 ML/MIN/1.73 M^2
GLUCOSE SERPL-MCNC: 79 MG/DL (ref 70–110)
HCT VFR BLD AUTO: 32.4 % (ref 37–48.5)
HGB BLD-MCNC: 10.3 G/DL (ref 12–16)
IMM GRANULOCYTES # BLD AUTO: 0.13 K/UL (ref 0–0.04)
IMM GRANULOCYTES NFR BLD AUTO: 1.7 % (ref 0–0.5)
LYMPHOCYTES # BLD AUTO: 2.1 K/UL (ref 1–4.8)
LYMPHOCYTES NFR BLD: 26.6 % (ref 18–48)
MCH RBC QN AUTO: 30.6 PG (ref 27–31)
MCHC RBC AUTO-ENTMCNC: 31.8 G/DL (ref 32–36)
MCV RBC AUTO: 96 FL (ref 82–98)
MONOCYTES # BLD AUTO: 0.5 K/UL (ref 0.3–1)
MONOCYTES NFR BLD: 6.6 % (ref 4–15)
NEUTROPHILS # BLD AUTO: 5 K/UL (ref 1.8–7.7)
NEUTROPHILS NFR BLD: 63.8 % (ref 38–73)
NRBC BLD-RTO: 0 /100 WBC
PLATELET # BLD AUTO: 395 K/UL (ref 150–450)
PMV BLD AUTO: 10.1 FL (ref 9.2–12.9)
POTASSIUM SERPL-SCNC: 3.9 MMOL/L (ref 3.5–5.1)
PROT SERPL-MCNC: 7.1 G/DL (ref 6–8.4)
RBC # BLD AUTO: 3.37 M/UL (ref 4–5.4)
SODIUM SERPL-SCNC: 137 MMOL/L (ref 136–145)
WBC # BLD AUTO: 7.86 K/UL (ref 3.9–12.7)

## 2024-01-22 PROCEDURE — 99999 PR PBB SHADOW E&M-EST. PATIENT-LVL III: CPT | Mod: PBBFAC,,,

## 2024-01-22 PROCEDURE — 85025 COMPLETE CBC W/AUTO DIFF WBC: CPT

## 2024-01-22 PROCEDURE — 80053 COMPREHEN METABOLIC PANEL: CPT

## 2024-01-22 PROCEDURE — 0503F POSTPARTUM CARE VISIT: CPT | Mod: CPTII,S$GLB,,

## 2024-01-22 PROCEDURE — 36415 COLL VENOUS BLD VENIPUNCTURE: CPT

## 2024-01-22 RX ORDER — NIFEDIPINE 30 MG/1
30 TABLET, EXTENDED RELEASE ORAL DAILY
Qty: 30 TABLET | Refills: 3 | Status: SHIPPED | OUTPATIENT
Start: 2024-01-22 | End: 2024-02-23

## 2024-01-22 NOTE — TELEPHONE ENCOUNTER
Called to see if everything was ok with pt. And to see if pt was going to make it to her appointment.    -pc

## 2024-01-22 NOTE — PROGRESS NOTES
"CC:  Postpartum, Mood/BP check     Subjective:     Gypsy Rivera is a 33 y.o.  who presents for postpartum mood and BP check.  She is POD #8 after a  delivery secondary to non-reassuring fetal heart tones. Pregnancy complicated by gHTN. PMH includes anxiety on Cymbalta and propanolol. She was discharged without initiation of antihypertensive medications.  She was noted to have elevated BP's on connected and asked to come in today.  She denies HA, vision changes, SOB, swelling, or RUQ pain. She denies nausea or vomiting. Pain has been well controlled. Denies redness or drainage from the incision. Breast Feeding. Reports baby is doing well overall. Denies symptoms of postpartum depression. PPDS: 2.  Overall doing well.     Review the Delivery Report for details.     Objective:     Vitals:  BP (!) 140/100   Ht 5' 4" (1.626 m)   Wt 70.9 kg (156 lb 4.9 oz)   LMP 2023   Breastfeeding Yes   BMI 26.83 kg/m²     General:   Alert and cooperative   Abdomen:  Abdomen is soft, non distended, non-tender.    Incision:  Clean, healing well. No erythema or drainage.     Skin:  Warm and dry, bilateral trace edema.      Assessment/Plan     Postpartum Care and Examination  - S/p  delivery - POD8   - Baby overall doing well  - Patient endorses breast feeding without difficulty  - Minimal bleeding  - No intercourse since delivery  - No s/sx postpartum depression, good support at home  - Continue daily wound care.    Postpartum hypertension   - BP elevated in clinic: 140/100  - Will get cbc, cmp, protein/creatinine ratio today.  - Start Procardia 30 mg daily.  - Check BP's morning and evening.  - ED precautions for elevated BP's or s/s of pre-E.      Follow up next week for BP check.    Joyce Hamm (Maggie), MAURICIO  Obstetrics and Gynecology  [facility]   "

## 2024-01-23 ENCOUNTER — TELEPHONE (OUTPATIENT)
Dept: OBSTETRICS AND GYNECOLOGY | Facility: CLINIC | Age: 34
End: 2024-01-23
Payer: COMMERCIAL

## 2024-01-23 NOTE — TELEPHONE ENCOUNTER
Called pt to check on her per Dr. Oscar's request.  Elevated BPs through connected MOM.      can she come in thursday/friday or monday for a BP check? can be with me, lisa, or julienne

## 2024-01-24 ENCOUNTER — PATIENT MESSAGE (OUTPATIENT)
Dept: OBSTETRICS AND GYNECOLOGY | Facility: CLINIC | Age: 34
End: 2024-01-24
Payer: COMMERCIAL

## 2024-01-24 ENCOUNTER — TELEPHONE (OUTPATIENT)
Dept: OBSTETRICS AND GYNECOLOGY | Facility: CLINIC | Age: 34
End: 2024-01-24
Payer: COMMERCIAL

## 2024-02-20 ENCOUNTER — TELEPHONE (OUTPATIENT)
Dept: OBSTETRICS AND GYNECOLOGY | Facility: CLINIC | Age: 34
End: 2024-02-20
Payer: COMMERCIAL

## 2024-02-20 NOTE — TELEPHONE ENCOUNTER
----- Message from Peyton Madrigal MA sent at 2/14/2024  5:07 PM CST -----    ----- Message -----  From: Kasey Oscar MD  Sent: 2/12/2024   8:40 AM CST  To: Kasey Oscar Staff    can we please see if rc can arrive for 10AM or 10:15AM on Friday, 23rd? I will be needing to get to [facility] for a Csection!  if so, please then block the 10:45 spot! thank you!

## 2024-02-22 ENCOUNTER — TELEPHONE (OUTPATIENT)
Dept: OBSTETRICS AND GYNECOLOGY | Facility: CLINIC | Age: 34
End: 2024-02-22
Payer: COMMERCIAL

## 2024-02-22 NOTE — PROGRESS NOTES
Subjective:     Gypsy Rivera is a 33 y.o.  who presents for a six week post-operative follow up after a  delivery. Today she denies nausea or vomiting. Pain has been well controlled. Reports some bladder irritation today, unsure if UTI. Denies redness or drainage from the incision. Breast Feeding and pumping. Reports baby is doing well overall. Denies symptoms of postpartum depression. Overall doing well. Presents with SO.    Review the Delivery Report for details.     Objective:     Vitals:  LMP 2023     General:   Alert and cooperative   Abdomen:  Abdomen is soft, non distended, non-tender.    Incision:  Clean, healing well. No erythema or drainage.     Pelvic:   Cervix without ectocervical lesions, vaginal tissue moist and rugated. No abnormal discharge. No bleeding.   Skin:  Warm and dry, bilateral trace edema.      Assessment:     Postpartum Care  - S/p  delivery - 6 weeks post-op ()  - Baby overall doing well  - Patient endorses breastfeeding without difficulty; denies mastitis concerns  - Minimal bleeding  - [place] since delivery  - Currently using no method for contraception  Not interested at this time  Reviewed breastfeeding not effective contraception  Discussed do not rec short interval pregnancy  Reviewed can be difficult to know when ovulating in postpartum period  Rec condom use  To let me know if interested in contraception in future  - No s/sx postpartum depression, good support at home  Cymbalta compliant  - Urine testing  - Will plan to follow-up for annual WWE or sooner PRN    gHTN  - Denies pree s/sx  - Medications: none currently     Plan:     1. Continue daily wound care.  2. Gradually resume normal activities.  3. Will plan to follow-up for annual WWE or sooner PRN.

## 2024-02-22 NOTE — TELEPHONE ENCOUNTER
Called pt to follow up in regards to appt Friday 02-23-24  No answer  LVM and call back number      Sent pt portal message     ND

## 2024-02-23 ENCOUNTER — POSTPARTUM VISIT (OUTPATIENT)
Dept: OBSTETRICS AND GYNECOLOGY | Facility: CLINIC | Age: 34
End: 2024-02-23
Payer: COMMERCIAL

## 2024-02-23 VITALS
BODY MASS INDEX: 24.46 KG/M2 | SYSTOLIC BLOOD PRESSURE: 108 MMHG | WEIGHT: 143.31 LBS | DIASTOLIC BLOOD PRESSURE: 84 MMHG | HEIGHT: 64 IN

## 2024-02-23 DIAGNOSIS — N32.89 BLADDER IRRITATION: Primary | ICD-10-CM

## 2024-02-23 LAB
BACTERIA #/AREA URNS AUTO: ABNORMAL /HPF
BILIRUB UR QL STRIP: NEGATIVE
CLARITY UR REFRACT.AUTO: CLEAR
COLOR UR AUTO: YELLOW
GLUCOSE UR QL STRIP: NEGATIVE
HGB UR QL STRIP: ABNORMAL
KETONES UR QL STRIP: NEGATIVE
LEUKOCYTE ESTERASE UR QL STRIP: ABNORMAL
MICROSCOPIC COMMENT: ABNORMAL
NITRITE UR QL STRIP: NEGATIVE
PH UR STRIP: 5 [PH] (ref 5–8)
PROT UR QL STRIP: NEGATIVE
RBC #/AREA URNS AUTO: >100 /HPF (ref 0–4)
SP GR UR STRIP: 1.02 (ref 1–1.03)
SQUAMOUS #/AREA URNS AUTO: 0 /HPF
URN SPEC COLLECT METH UR: ABNORMAL
WBC #/AREA URNS AUTO: 8 /HPF (ref 0–5)

## 2024-02-23 PROCEDURE — 87086 URINE CULTURE/COLONY COUNT: CPT | Performed by: STUDENT IN AN ORGANIZED HEALTH CARE EDUCATION/TRAINING PROGRAM

## 2024-02-23 PROCEDURE — 0503F POSTPARTUM CARE VISIT: CPT | Mod: CPTII,S$GLB,, | Performed by: STUDENT IN AN ORGANIZED HEALTH CARE EDUCATION/TRAINING PROGRAM

## 2024-02-23 PROCEDURE — 99999 PR PBB SHADOW E&M-EST. PATIENT-LVL III: CPT | Mod: PBBFAC,,, | Performed by: STUDENT IN AN ORGANIZED HEALTH CARE EDUCATION/TRAINING PROGRAM

## 2024-02-23 PROCEDURE — 81001 URINALYSIS AUTO W/SCOPE: CPT | Performed by: STUDENT IN AN ORGANIZED HEALTH CARE EDUCATION/TRAINING PROGRAM

## 2024-02-25 LAB
BACTERIA UR CULT: NORMAL
BACTERIA UR CULT: NORMAL

## 2024-04-15 PROBLEM — O13.3 GESTATIONAL HYPERTENSION, THIRD TRIMESTER: Status: RESOLVED | Noted: 2024-01-13 | Resolved: 2024-04-15

## 2024-04-16 ENCOUNTER — PATIENT MESSAGE (OUTPATIENT)
Dept: OBSTETRICS AND GYNECOLOGY | Facility: CLINIC | Age: 34
End: 2024-04-16
Payer: COMMERCIAL

## 2024-04-17 NOTE — TELEPHONE ENCOUNTER
Called pt in regards to the pt portal message.   Pt expressed understanding  Informed pt provider is out of office until Monday   Pt stated that is okay     No further questions    ND

## (undated) DEVICE — DRESSING SILVERLON  ISL 4X10IN